# Patient Record
Sex: MALE | Race: WHITE | NOT HISPANIC OR LATINO
[De-identification: names, ages, dates, MRNs, and addresses within clinical notes are randomized per-mention and may not be internally consistent; named-entity substitution may affect disease eponyms.]

---

## 2017-01-10 ENCOUNTER — APPOINTMENT (OUTPATIENT)
Dept: CARDIOLOGY | Facility: CLINIC | Age: 67
End: 2017-01-10

## 2017-01-11 ENCOUNTER — NON-APPOINTMENT (OUTPATIENT)
Age: 67
End: 2017-01-11

## 2017-01-12 ENCOUNTER — CLINICAL ADVICE (OUTPATIENT)
Age: 67
End: 2017-01-12

## 2017-01-17 ENCOUNTER — APPOINTMENT (OUTPATIENT)
Dept: CARDIOLOGY | Facility: CLINIC | Age: 67
End: 2017-01-17

## 2017-01-23 ENCOUNTER — APPOINTMENT (OUTPATIENT)
Dept: CARDIOLOGY | Facility: CLINIC | Age: 67
End: 2017-01-23

## 2017-02-08 ENCOUNTER — CHART COPY (OUTPATIENT)
Age: 67
End: 2017-02-08

## 2017-02-22 ENCOUNTER — APPOINTMENT (OUTPATIENT)
Dept: ELECTROPHYSIOLOGY | Facility: CLINIC | Age: 67
End: 2017-02-22

## 2017-02-22 VITALS
OXYGEN SATURATION: 96 % | HEIGHT: 73 IN | HEART RATE: 70 BPM | BODY MASS INDEX: 31.14 KG/M2 | SYSTOLIC BLOOD PRESSURE: 115 MMHG | WEIGHT: 235 LBS | DIASTOLIC BLOOD PRESSURE: 79 MMHG

## 2017-02-22 RX ORDER — ZOLPIDEM TARTRATE 12.5 MG/1
12.5 TABLET, COATED ORAL
Refills: 0 | Status: ACTIVE | COMMUNITY

## 2017-03-28 ENCOUNTER — TRANSCRIPTION ENCOUNTER (OUTPATIENT)
Age: 67
End: 2017-03-28

## 2017-03-28 ENCOUNTER — OUTPATIENT (OUTPATIENT)
Dept: INPATIENT UNIT | Facility: HOSPITAL | Age: 67
LOS: 1 days | End: 2017-03-28
Payer: COMMERCIAL

## 2017-03-28 VITALS
DIASTOLIC BLOOD PRESSURE: 83 MMHG | HEIGHT: 73 IN | SYSTOLIC BLOOD PRESSURE: 137 MMHG | RESPIRATION RATE: 18 BRPM | OXYGEN SATURATION: 98 % | TEMPERATURE: 99 F | HEART RATE: 61 BPM | WEIGHT: 214.95 LBS

## 2017-03-28 DIAGNOSIS — I49 OTHER CARDIAC ARRHYTHMIAS: ICD-10-CM

## 2017-03-28 DIAGNOSIS — I49.1 ATRIAL PREMATURE DEPOLARIZATION: ICD-10-CM

## 2017-03-28 LAB
ALBUMIN SERPL ELPH-MCNC: 4.2 G/DL — SIGNIFICANT CHANGE UP (ref 3.3–5)
ALP SERPL-CCNC: 77 U/L — SIGNIFICANT CHANGE UP (ref 40–120)
ALT FLD-CCNC: 14 U/L RC — SIGNIFICANT CHANGE UP (ref 10–45)
ANION GAP SERPL CALC-SCNC: 14 MMOL/L — SIGNIFICANT CHANGE UP (ref 5–17)
APTT BLD: 30.4 SEC — SIGNIFICANT CHANGE UP (ref 27.5–37.4)
AST SERPL-CCNC: 25 U/L — SIGNIFICANT CHANGE UP (ref 10–40)
BILIRUB SERPL-MCNC: 0.3 MG/DL — SIGNIFICANT CHANGE UP (ref 0.2–1.2)
BLD GP AB SCN SERPL QL: NEGATIVE — SIGNIFICANT CHANGE UP
BUN SERPL-MCNC: 15 MG/DL — SIGNIFICANT CHANGE UP (ref 7–23)
CALCIUM SERPL-MCNC: 8.7 MG/DL — SIGNIFICANT CHANGE UP (ref 8.4–10.5)
CHLORIDE SERPL-SCNC: 106 MMOL/L — SIGNIFICANT CHANGE UP (ref 96–108)
CO2 SERPL-SCNC: 27 MMOL/L — SIGNIFICANT CHANGE UP (ref 22–31)
CREAT SERPL-MCNC: 1.15 MG/DL — SIGNIFICANT CHANGE UP (ref 0.5–1.3)
GLUCOSE SERPL-MCNC: 111 MG/DL — HIGH (ref 70–99)
HCT VFR BLD CALC: 41 % — SIGNIFICANT CHANGE UP (ref 39–50)
HGB BLD-MCNC: 13.1 G/DL — SIGNIFICANT CHANGE UP (ref 13–17)
INR BLD: 1.05 RATIO — SIGNIFICANT CHANGE UP (ref 0.88–1.16)
MCHC RBC-ENTMCNC: 28.4 PG — SIGNIFICANT CHANGE UP (ref 27–34)
MCHC RBC-ENTMCNC: 32 GM/DL — SIGNIFICANT CHANGE UP (ref 32–36)
MCV RBC AUTO: 88.7 FL — SIGNIFICANT CHANGE UP (ref 80–100)
PLATELET # BLD AUTO: 154 K/UL — SIGNIFICANT CHANGE UP (ref 150–400)
POTASSIUM SERPL-MCNC: 3.8 MMOL/L — SIGNIFICANT CHANGE UP (ref 3.5–5.3)
POTASSIUM SERPL-SCNC: 3.8 MMOL/L — SIGNIFICANT CHANGE UP (ref 3.5–5.3)
PROT SERPL-MCNC: 6.7 G/DL — SIGNIFICANT CHANGE UP (ref 6–8.3)
PROTHROM AB SERPL-ACNC: 11.3 SEC — SIGNIFICANT CHANGE UP (ref 9.8–12.7)
RBC # BLD: 4.62 M/UL — SIGNIFICANT CHANGE UP (ref 4.2–5.8)
RBC # FLD: 11.4 % — SIGNIFICANT CHANGE UP (ref 10.3–14.5)
RH IG SCN BLD-IMP: POSITIVE — SIGNIFICANT CHANGE UP
SODIUM SERPL-SCNC: 147 MMOL/L — HIGH (ref 135–145)
WBC # BLD: 4.7 K/UL — SIGNIFICANT CHANGE UP (ref 3.8–10.5)
WBC # FLD AUTO: 4.7 K/UL — SIGNIFICANT CHANGE UP (ref 3.8–10.5)

## 2017-03-28 PROCEDURE — 93653 COMPRE EP EVAL TX SVT: CPT

## 2017-03-28 PROCEDURE — 93613 INTRACARDIAC EPHYS 3D MAPG: CPT

## 2017-03-28 PROCEDURE — 99152 MOD SED SAME PHYS/QHP 5/>YRS: CPT

## 2017-03-28 RX ORDER — TRAMADOL HYDROCHLORIDE 50 MG/1
50 TABLET ORAL ONCE
Qty: 0 | Refills: 0 | Status: DISCONTINUED | OUTPATIENT
Start: 2017-03-28 | End: 2017-03-28

## 2017-03-28 RX ORDER — ZOLPIDEM TARTRATE 10 MG/1
5 TABLET ORAL AT BEDTIME
Qty: 0 | Refills: 0 | Status: DISCONTINUED | OUTPATIENT
Start: 2017-03-28 | End: 2017-03-29

## 2017-03-28 RX ORDER — TRAMADOL HYDROCHLORIDE 50 MG/1
50 TABLET ORAL EVERY 6 HOURS
Qty: 0 | Refills: 0 | Status: DISCONTINUED | OUTPATIENT
Start: 2017-03-28 | End: 2017-03-28

## 2017-03-28 RX ORDER — METOPROLOL TARTRATE 50 MG
25 TABLET ORAL DAILY
Qty: 0 | Refills: 0 | Status: DISCONTINUED | OUTPATIENT
Start: 2017-03-28 | End: 2017-03-29

## 2017-03-28 RX ORDER — ACETAMINOPHEN 500 MG
650 TABLET ORAL EVERY 6 HOURS
Qty: 0 | Refills: 0 | Status: DISCONTINUED | OUTPATIENT
Start: 2017-03-28 | End: 2017-03-29

## 2017-03-28 RX ORDER — VALSARTAN 80 MG/1
160 TABLET ORAL DAILY
Qty: 0 | Refills: 0 | Status: DISCONTINUED | OUTPATIENT
Start: 2017-03-28 | End: 2017-03-29

## 2017-03-28 RX ORDER — TRAMADOL HYDROCHLORIDE 50 MG/1
100 TABLET ORAL EVERY 6 HOURS
Qty: 0 | Refills: 0 | Status: DISCONTINUED | OUTPATIENT
Start: 2017-03-28 | End: 2017-03-29

## 2017-03-28 RX ORDER — ASPIRIN/CALCIUM CARB/MAGNESIUM 324 MG
325 TABLET ORAL DAILY
Qty: 0 | Refills: 0 | Status: DISCONTINUED | OUTPATIENT
Start: 2017-03-28 | End: 2017-03-29

## 2017-03-28 RX ADMIN — Medication 100 MILLIGRAM(S): at 17:00

## 2017-03-28 RX ADMIN — TRAMADOL HYDROCHLORIDE 50 MILLIGRAM(S): 50 TABLET ORAL at 13:56

## 2017-03-28 RX ADMIN — TRAMADOL HYDROCHLORIDE 50 MILLIGRAM(S): 50 TABLET ORAL at 13:38

## 2017-03-28 RX ADMIN — ZOLPIDEM TARTRATE 5 MILLIGRAM(S): 10 TABLET ORAL at 21:02

## 2017-03-28 RX ADMIN — TRAMADOL HYDROCHLORIDE 50 MILLIGRAM(S): 50 TABLET ORAL at 16:50

## 2017-03-28 RX ADMIN — Medication 1 APPLICATION(S): at 17:02

## 2017-03-28 RX ADMIN — TRAMADOL HYDROCHLORIDE 100 MILLIGRAM(S): 50 TABLET ORAL at 21:30

## 2017-03-28 RX ADMIN — TRAMADOL HYDROCHLORIDE 100 MILLIGRAM(S): 50 TABLET ORAL at 21:02

## 2017-03-28 RX ADMIN — Medication 650 MILLIGRAM(S): at 22:17

## 2017-03-28 RX ADMIN — ZOLPIDEM TARTRATE 5 MILLIGRAM(S): 10 TABLET ORAL at 22:17

## 2017-03-28 RX ADMIN — Medication 100 MILLIGRAM(S): at 21:02

## 2017-03-28 RX ADMIN — Medication 650 MILLIGRAM(S): at 22:40

## 2017-03-28 NOTE — DISCHARGE NOTE ADULT - PATIENT PORTAL LINK FT
“You can access the FollowHealth Patient Portal, offered by [facility], by registering with the following website: [email or web address]”

## 2017-03-28 NOTE — DISCHARGE NOTE ADULT - CARE PROVIDER_API CALL
Jesus Mcmanus (MD), Cardiac Electrophysiology; Cardiology; Internal Medicine  [address] [place] [address]  Phone: [phone]  Fax: [phone]

## 2017-03-28 NOTE — DISCHARGE NOTE ADULT - ADDITIONAL INSTRUCTIONS
follow up with your cardiologist and PCP in 1-2 weeks follow up with your cardiologist and PCP in 1-2 weeks  Follow up with Dr Mcmanus's team April 11th at 3:40pm

## 2017-03-28 NOTE — H&P CARDIOLOGY - PMH
Aneurysm  of the Aortic Root  Gout    Dizziness    TIA (transient ischemic attack)  2008  Fall  with several injuries  Insomnia    GERD (gastroesophageal reflux disease)    Hypertension Aneurysm  of the Aortic Root  Dizziness    Fall  with several injuries  GERD (gastroesophageal reflux disease)    Gout    Hypertension    Insomnia    Measles    Mumps    Nutcracker esophagus    TIA (transient ischemic attack)  2008

## 2017-03-28 NOTE — DISCHARGE NOTE ADULT - PLAN OF CARE
please follow dc instructions as per EP nurse practitioner and Md Mcmanus  No heavy lifting for 2 weeks, no strenuous activity  or uneccesary stair climbing, no driving for x 2 days,  you may shower 24 hours following procedure but no bathing or swimming for x1  week, no bending, no straining while having a Bowel movement, No strenuous sexual activity for x 1 week check groin for bleeding, pain, tightness or ( golf ball size)  swelling daily following procedure , & follow up with your cardiologist in 1-2 week Your blood pressure will be controlled. Continue with your blood pressure medications; eat a heart healthy diet with low salt diet; exercise regularly (consult with your physician or cardiologist first); maintain a heart healthy weight; if you smoke - quit (A resource to help you stop smoking is the [facility] Tobacco [facility] – phone number [phone].); include healthy ways to manage stress. Continue to follow with your primary care physician or cardiologist. will remain asymptomatic

## 2017-03-28 NOTE — DISCHARGE NOTE ADULT - CARE PROVIDERS DIRECT ADDRESSES
[email or web address]

## 2017-03-28 NOTE — H&P CARDIOLOGY - HISTORY OF PRESENT ILLNESS
61 y/o male with PMH HTN, GERD, TIA-2008, Gout,, Chronic Back Pain presented to [facility] this a:m c/o chest pressure radiating to neck associated with left shin pain and slight SOB.  Pt. had similar symptoms one week ago and was sent home from [place] ED.  Pt. underwent CTA with contrast which revealed Ascending Aortic Aneurysm with maximum caliber of 4.8 cm.  Pt. transferred to [facility] for cardiac catheterization and further evaluation. 63 y/o male with PMH HTN, GERD, TIA-2008, Gout, TIA, Chronic Back pain s/p cervical, lumbar fusion, nutcracker esophagus, Ascending Aortic Aneurysm with maximum caliber of 4.8 cm, SVT, presents for SVT ablation.

## 2017-03-28 NOTE — H&P CARDIOLOGY - PSH
S/P spinal fusion  2009-L2-L3 L3-L4  S/P spinal fusion  C 5,6,7-2009  S/P lumbar and lumbosacral fusion by anterior technique  2004  S/P knee replacement  bilateral-2006 S/P knee replacement  bilateral-2006  S/P lumbar and lumbosacral fusion by anterior technique  2004  S/P spinal fusion  2009-L2-L3 L3-L4  S/P spinal fusion  C 5,6,7-2009

## 2017-03-28 NOTE — DISCHARGE NOTE ADULT - MEDICATION SUMMARY - MEDICATIONS TO TAKE
I will START or STAY ON the medications listed below when I get home from the hospital:    traMADol 50 mg oral tablet  -- 2 tab(s) by mouth every 6 hours  -- Indication: For pain    aspirin 325 mg oral tablet  -- 1 tab(s) by mouth once a day  -- Indication: For cad    Lyrica 100 mg oral capsule  -- 1 cap(s) by mouth 3 times a day  -- Indication: For pain    Diovan  mg-12.5 mg oral tablet  -- 1 tab(s) by mouth once a day  -- Indication: For high blood pressure    Ambien CR 12.5 mg oral tablet, extended release  -- 1 tab(s) by mouth once a day (at bedtime)  -- Indication: For insomnia    Toprol-XL 25 mg oral tablet, extended release  -- 1 tab(s) by mouth once a day  -- Indication: For high blood pressure    Soma 350 mg oral tablet  -- 1 tab(s) by mouth 3 times a day, As Needed  -- Indication: For Muscle relaxant    Dexilant 60 mg oral delayed release capsule  -- 1 cap(s) by mouth once a day  -- Indication: For Acid reflux

## 2017-03-28 NOTE — DISCHARGE NOTE ADULT - CARE PLAN
Principal Discharge DX:	PVC (premature ventricular contraction)  Goal:	will remain asymptomatic  Instructions for follow-up, activity and diet:	please follow dc instructions as per EP nurse practitioner and Md Mcmanus  No heavy lifting for 2 weeks, no strenuous activity  or uneccesary stair climbing, no driving for x 2 days,  you may shower 24 hours following procedure but no bathing or swimming for x1  week, no bending, no straining while having a Bowel movement, No strenuous sexual activity for x 1 week check groin for bleeding, pain, tightness or ( golf ball size)  swelling daily following procedure , & follow up with your cardiologist in 1-2 week  Secondary Diagnosis:	Essential hypertension  Goal:	Your blood pressure will be controlled.  Instructions for follow-up, activity and diet:	Continue with your blood pressure medications; eat a heart healthy diet with low salt diet; exercise regularly (consult with your physician or cardiologist first); maintain a heart healthy weight; if you smoke - quit (A resource to help you stop smoking is the [facility] Tobacco [facility] – phone number [phone].); include healthy ways to manage stress. Continue to follow with your primary care physician or cardiologist. Principal Discharge DX:	PVC (premature ventricular contraction)  Goal:	will remain asymptomatic  Instructions for follow-up, activity and diet:	please follow dc instructions as per EP nurse practitioner and Md Mcmanus  No heavy lifting for 2 weeks, no strenuous activity  or uneccesary stair climbing, no driving for x 2 days,  you may shower 24 hours following procedure but no bathing or swimming for x1  week, no bending, no straining while having a Bowel movement, No strenuous sexual activity for x 1 week check groin for bleeding, pain, tightness or ( golf ball size)  swelling daily following procedure , & follow up with your cardiologist in 1-2 week  Secondary Diagnosis:	Essential hypertension  Goal:	Your blood pressure will be controlled.  Instructions for follow-up, activity and diet:	Continue with your blood pressure medications; eat a heart healthy diet with low salt diet; exercise regularly (consult with your physician or cardiologist first); maintain a heart healthy weight; if you smoke - quit (A resource to help you stop smoking is the [facility] – phone number [phone].); include healthy ways to manage stress. Continue to follow with your primary care physician or cardiologist. Principal Discharge DX:	PVC (premature ventricular contraction)  Goal:	will remain asymptomatic  Instructions for follow-up, activity and diet:	please follow dc instructions as per EP nurse practitioner and Md Mcmanus  No heavy lifting for 2 weeks, no strenuous activity  or uneccesary stair climbing, no driving for x 2 days,  you may shower 24 hours following procedure but no bathing or swimming for x1  week, no bending, no straining while having a Bowel movement, No strenuous sexual activity for x 1 week check groin for bleeding, pain, tightness or ( golf ball size)  swelling daily following procedure , & follow up with your cardiologist in 1-2 week  Secondary Diagnosis:	Essential hypertension  Goal:	Your blood pressure will be controlled.  Instructions for follow-up, activity and diet:	Continue with your blood pressure medications; eat a heart healthy diet with low salt diet; exercise regularly (consult with your physician or cardiologist first); maintain a heart healthy weight; if you smoke - quit (A resource to help you stop smoking is the [facility] – phone number [phone].); include healthy ways to manage stress. Continue to follow with your primary care physician or cardiologist. Principal Discharge DX:	PVC (premature ventricular contraction)  Goal:	will remain asymptomatic  Instructions for follow-up, activity and diet:	please follow dc instructions as per EP nurse practitioner and Md Mcmanus  No heavy lifting for 2 weeks, no strenuous activity  or uneccesary stair climbing, no driving for x 2 days,  you may shower 24 hours following procedure but no bathing or swimming for x1  week, no bending, no straining while having a Bowel movement, No strenuous sexual activity for x 1 week check groin for bleeding, pain, tightness or ( golf ball size)  swelling daily following procedure , & follow up with your cardiologist in 1-2 week  Secondary Diagnosis:	Essential hypertension  Goal:	Your blood pressure will be controlled.  Instructions for follow-up, activity and diet:	Continue with your blood pressure medications; eat a heart healthy diet with low salt diet; exercise regularly (consult with your physician or cardiologist first); maintain a heart healthy weight; if you smoke - quit (A resource to help you stop smoking is the [facility] Tobacco [facility] – phone number [phone].); include healthy ways to manage stress. Continue to follow with your primary care physician or cardiologist.

## 2017-03-29 VITALS
DIASTOLIC BLOOD PRESSURE: 84 MMHG | HEART RATE: 57 BPM | OXYGEN SATURATION: 95 % | SYSTOLIC BLOOD PRESSURE: 138 MMHG | RESPIRATION RATE: 18 BRPM | TEMPERATURE: 98 F

## 2017-03-29 LAB
ANION GAP SERPL CALC-SCNC: 13 MMOL/L — SIGNIFICANT CHANGE UP (ref 5–17)
BUN SERPL-MCNC: 18 MG/DL — SIGNIFICANT CHANGE UP (ref 7–23)
CALCIUM SERPL-MCNC: 8.4 MG/DL — SIGNIFICANT CHANGE UP (ref 8.4–10.5)
CHLORIDE SERPL-SCNC: 109 MMOL/L — HIGH (ref 96–108)
CO2 SERPL-SCNC: 23 MMOL/L — SIGNIFICANT CHANGE UP (ref 22–31)
CREAT SERPL-MCNC: 1 MG/DL — SIGNIFICANT CHANGE UP (ref 0.5–1.3)
GLUCOSE SERPL-MCNC: 101 MG/DL — HIGH (ref 70–99)
HCT VFR BLD CALC: 37.2 % — LOW (ref 39–50)
HGB BLD-MCNC: 12.3 G/DL — LOW (ref 13–17)
MCHC RBC-ENTMCNC: 29 PG — SIGNIFICANT CHANGE UP (ref 27–34)
MCHC RBC-ENTMCNC: 33 GM/DL — SIGNIFICANT CHANGE UP (ref 32–36)
MCV RBC AUTO: 88 FL — SIGNIFICANT CHANGE UP (ref 80–100)
PLATELET # BLD AUTO: 138 K/UL — LOW (ref 150–400)
POTASSIUM SERPL-MCNC: 3.5 MMOL/L — SIGNIFICANT CHANGE UP (ref 3.5–5.3)
POTASSIUM SERPL-SCNC: 3.5 MMOL/L — SIGNIFICANT CHANGE UP (ref 3.5–5.3)
RBC # BLD: 4.22 M/UL — SIGNIFICANT CHANGE UP (ref 4.2–5.8)
RBC # FLD: 11.2 % — SIGNIFICANT CHANGE UP (ref 10.3–14.5)
SODIUM SERPL-SCNC: 145 MMOL/L — SIGNIFICANT CHANGE UP (ref 135–145)
WBC # BLD: 5.8 K/UL — SIGNIFICANT CHANGE UP (ref 3.8–10.5)
WBC # FLD AUTO: 5.8 K/UL — SIGNIFICANT CHANGE UP (ref 3.8–10.5)

## 2017-03-29 PROCEDURE — 86850 RBC ANTIBODY SCREEN: CPT

## 2017-03-29 PROCEDURE — 80048 BASIC METABOLIC PNL TOTAL CA: CPT

## 2017-03-29 PROCEDURE — 86900 BLOOD TYPING SEROLOGIC ABO: CPT

## 2017-03-29 PROCEDURE — C1894: CPT

## 2017-03-29 PROCEDURE — 93005 ELECTROCARDIOGRAM TRACING: CPT

## 2017-03-29 PROCEDURE — 93010 ELECTROCARDIOGRAM REPORT: CPT

## 2017-03-29 PROCEDURE — 80053 COMPREHEN METABOLIC PANEL: CPT

## 2017-03-29 PROCEDURE — 99153 MOD SED SAME PHYS/QHP EA: CPT

## 2017-03-29 PROCEDURE — 85610 PROTHROMBIN TIME: CPT

## 2017-03-29 PROCEDURE — 93653 COMPRE EP EVAL TX SVT: CPT

## 2017-03-29 PROCEDURE — 85730 THROMBOPLASTIN TIME PARTIAL: CPT

## 2017-03-29 PROCEDURE — 86901 BLOOD TYPING SEROLOGIC RH(D): CPT

## 2017-03-29 PROCEDURE — 85027 COMPLETE CBC AUTOMATED: CPT

## 2017-03-29 PROCEDURE — 93613 INTRACARDIAC EPHYS 3D MAPG: CPT

## 2017-03-29 PROCEDURE — C1893: CPT

## 2017-03-29 PROCEDURE — C1730: CPT

## 2017-03-29 PROCEDURE — 99152 MOD SED SAME PHYS/QHP 5/>YRS: CPT

## 2017-03-29 RX ORDER — PANTOPRAZOLE SODIUM 20 MG/1
40 TABLET, DELAYED RELEASE ORAL
Qty: 0 | Refills: 0 | Status: DISCONTINUED | OUTPATIENT
Start: 2017-03-29 | End: 2017-03-29

## 2017-03-29 RX ADMIN — PANTOPRAZOLE SODIUM 40 MILLIGRAM(S): 20 TABLET, DELAYED RELEASE ORAL at 06:50

## 2017-03-29 RX ADMIN — Medication 650 MILLIGRAM(S): at 06:54

## 2017-03-29 RX ADMIN — VALSARTAN 160 MILLIGRAM(S): 80 TABLET ORAL at 06:31

## 2017-03-29 RX ADMIN — TRAMADOL HYDROCHLORIDE 100 MILLIGRAM(S): 50 TABLET ORAL at 02:50

## 2017-03-29 RX ADMIN — Medication 325 MILLIGRAM(S): at 13:20

## 2017-03-29 RX ADMIN — Medication 650 MILLIGRAM(S): at 06:33

## 2017-03-29 RX ADMIN — Medication 25 MILLIGRAM(S): at 06:31

## 2017-03-29 RX ADMIN — Medication 100 MILLIGRAM(S): at 06:30

## 2017-03-29 RX ADMIN — TRAMADOL HYDROCHLORIDE 100 MILLIGRAM(S): 50 TABLET ORAL at 02:37

## 2017-03-29 RX ADMIN — TRAMADOL HYDROCHLORIDE 100 MILLIGRAM(S): 50 TABLET ORAL at 09:53

## 2017-03-29 RX ADMIN — TRAMADOL HYDROCHLORIDE 100 MILLIGRAM(S): 50 TABLET ORAL at 09:22

## 2017-03-29 RX ADMIN — Medication 1 APPLICATION(S): at 06:50

## 2017-03-29 RX ADMIN — Medication 100 MILLIGRAM(S): at 13:20

## 2017-04-03 ENCOUNTER — NON-APPOINTMENT (OUTPATIENT)
Age: 67
End: 2017-04-03

## 2017-04-03 ENCOUNTER — APPOINTMENT (OUTPATIENT)
Dept: ELECTROPHYSIOLOGY | Facility: CLINIC | Age: 67
End: 2017-04-03

## 2017-04-03 VITALS
SYSTOLIC BLOOD PRESSURE: 127 MMHG | OXYGEN SATURATION: 95 % | HEIGHT: 73 IN | HEART RATE: 63 BPM | WEIGHT: 240 LBS | DIASTOLIC BLOOD PRESSURE: 84 MMHG | BODY MASS INDEX: 31.81 KG/M2

## 2017-04-03 DIAGNOSIS — R00.2 PALPITATIONS: ICD-10-CM

## 2017-04-03 DIAGNOSIS — I47.1 SUPRAVENTRICULAR TACHYCARDIA: ICD-10-CM

## 2017-04-03 RX ORDER — FLUTICASONE PROPIONATE 50 UG/1
50 SPRAY, METERED NASAL
Qty: 16 | Refills: 0 | Status: ACTIVE | COMMUNITY
Start: 2016-12-20

## 2017-04-06 ENCOUNTER — NON-APPOINTMENT (OUTPATIENT)
Age: 67
End: 2017-04-06

## 2017-04-06 ENCOUNTER — APPOINTMENT (OUTPATIENT)
Dept: CARDIOLOGY | Facility: CLINIC | Age: 67
End: 2017-04-06

## 2017-04-06 VITALS
WEIGHT: 240 LBS | HEART RATE: 62 BPM | RESPIRATION RATE: 17 BRPM | OXYGEN SATURATION: 97 % | DIASTOLIC BLOOD PRESSURE: 90 MMHG | HEIGHT: 73 IN | BODY MASS INDEX: 31.81 KG/M2 | SYSTOLIC BLOOD PRESSURE: 149 MMHG

## 2017-04-06 VITALS — SYSTOLIC BLOOD PRESSURE: 120 MMHG | DIASTOLIC BLOOD PRESSURE: 80 MMHG

## 2017-04-11 ENCOUNTER — NON-APPOINTMENT (OUTPATIENT)
Age: 67
End: 2017-04-11

## 2017-04-13 ENCOUNTER — MESSAGE (OUTPATIENT)
Age: 67
End: 2017-04-13

## 2017-05-10 ENCOUNTER — CHART COPY (OUTPATIENT)
Age: 67
End: 2017-05-10

## 2017-05-10 DIAGNOSIS — R00.2 PALPITATIONS: ICD-10-CM

## 2017-05-15 ENCOUNTER — APPOINTMENT (OUTPATIENT)
Dept: CARDIOLOGY | Facility: CLINIC | Age: 67
End: 2017-05-15

## 2017-06-12 ENCOUNTER — RX RENEWAL (OUTPATIENT)
Age: 67
End: 2017-06-12

## 2017-07-14 ENCOUNTER — RX RENEWAL (OUTPATIENT)
Age: 67
End: 2017-07-14

## 2017-09-27 ENCOUNTER — CLINICAL ADVICE (OUTPATIENT)
Age: 67
End: 2017-09-27

## 2017-10-17 ENCOUNTER — APPOINTMENT (OUTPATIENT)
Dept: CT IMAGING | Facility: HOSPITAL | Age: 67
End: 2017-10-17

## 2017-10-17 ENCOUNTER — OUTPATIENT (OUTPATIENT)
Dept: OUTPATIENT SERVICES | Facility: HOSPITAL | Age: 67
LOS: 1 days | End: 2017-10-17
Payer: COMMERCIAL

## 2017-10-17 ENCOUNTER — APPOINTMENT (OUTPATIENT)
Dept: CARDIOLOGY | Facility: CLINIC | Age: 67
End: 2017-10-17

## 2017-10-17 DIAGNOSIS — R07.9 CHEST PAIN, UNSPECIFIED: ICD-10-CM

## 2017-10-17 DIAGNOSIS — I71.9 AORTIC ANEURYSM OF UNSPECIFIED SITE, WITHOUT RUPTURE: ICD-10-CM

## 2017-10-17 LAB
BUN SERPL-MCNC: 19 MG/DL — SIGNIFICANT CHANGE UP (ref 7–23)
CREAT SERPL-MCNC: 1.33 MG/DL — HIGH (ref 0.5–1.3)

## 2017-10-17 PROCEDURE — 82565 ASSAY OF CREATININE: CPT

## 2017-10-17 PROCEDURE — 75574 CT ANGIO HRT W/3D IMAGE: CPT | Mod: 26

## 2017-10-17 PROCEDURE — 84520 ASSAY OF UREA NITROGEN: CPT

## 2017-10-17 PROCEDURE — 75574 CT ANGIO HRT W/3D IMAGE: CPT

## 2017-10-27 ENCOUNTER — APPOINTMENT (OUTPATIENT)
Dept: CARDIOTHORACIC SURGERY | Facility: CLINIC | Age: 67
End: 2017-10-27
Payer: COMMERCIAL

## 2017-10-27 VITALS — DIASTOLIC BLOOD PRESSURE: 77 MMHG | SYSTOLIC BLOOD PRESSURE: 117 MMHG

## 2017-10-27 VITALS
SYSTOLIC BLOOD PRESSURE: 112 MMHG | OXYGEN SATURATION: 97 % | DIASTOLIC BLOOD PRESSURE: 78 MMHG | HEIGHT: 74 IN | WEIGHT: 245 LBS | HEART RATE: 66 BPM | BODY MASS INDEX: 31.44 KG/M2 | TEMPERATURE: 98 F | RESPIRATION RATE: 15 BRPM

## 2017-10-27 DIAGNOSIS — Z82.49 FAMILY HISTORY OF ISCHEMIC HEART DISEASE AND OTHER DISEASES OF THE CIRCULATORY SYSTEM: ICD-10-CM

## 2017-10-27 DIAGNOSIS — Z84.89 FAMILY HISTORY OF OTHER SPECIFIED CONDITIONS: ICD-10-CM

## 2017-10-27 PROCEDURE — 99243 OFF/OP CNSLTJ NEW/EST LOW 30: CPT

## 2017-10-27 RX ORDER — PREGABALIN 50 MG/1
50 CAPSULE ORAL
Qty: 90 | Refills: 0 | Status: DISCONTINUED | COMMUNITY
Start: 2016-11-17 | End: 2017-10-27

## 2017-11-14 ENCOUNTER — OUTPATIENT (OUTPATIENT)
Dept: OUTPATIENT SERVICES | Facility: HOSPITAL | Age: 67
LOS: 1 days | End: 2017-11-14
Payer: COMMERCIAL

## 2017-11-14 VITALS
TEMPERATURE: 98 F | SYSTOLIC BLOOD PRESSURE: 128 MMHG | RESPIRATION RATE: 16 BRPM | HEART RATE: 59 BPM | OXYGEN SATURATION: 96 % | DIASTOLIC BLOOD PRESSURE: 63 MMHG | WEIGHT: 244.93 LBS | HEIGHT: 73 IN

## 2017-11-14 DIAGNOSIS — Z98.890 OTHER SPECIFIED POSTPROCEDURAL STATES: Chronic | ICD-10-CM

## 2017-11-14 DIAGNOSIS — Z90.49 ACQUIRED ABSENCE OF OTHER SPECIFIED PARTS OF DIGESTIVE TRACT: Chronic | ICD-10-CM

## 2017-11-14 DIAGNOSIS — R93.1 ABNORMAL FINDINGS ON DIAGNOSTIC IMAGING OF HEART AND CORONARY CIRCULATION: ICD-10-CM

## 2017-11-14 DIAGNOSIS — I49.1 ATRIAL PREMATURE DEPOLARIZATION: ICD-10-CM

## 2017-11-14 LAB
ALBUMIN SERPL ELPH-MCNC: 4.3 G/DL — SIGNIFICANT CHANGE UP (ref 3.3–5)
ALP SERPL-CCNC: 59 U/L — SIGNIFICANT CHANGE UP (ref 40–120)
ALT FLD-CCNC: 12 U/L RC — SIGNIFICANT CHANGE UP (ref 10–45)
ANION GAP SERPL CALC-SCNC: 13 MMOL/L — SIGNIFICANT CHANGE UP (ref 5–17)
AST SERPL-CCNC: 21 U/L — SIGNIFICANT CHANGE UP (ref 10–40)
BILIRUB SERPL-MCNC: 0.3 MG/DL — SIGNIFICANT CHANGE UP (ref 0.2–1.2)
BUN SERPL-MCNC: 21 MG/DL — SIGNIFICANT CHANGE UP (ref 7–23)
CALCIUM SERPL-MCNC: 9.4 MG/DL — SIGNIFICANT CHANGE UP (ref 8.4–10.5)
CHLORIDE SERPL-SCNC: 104 MMOL/L — SIGNIFICANT CHANGE UP (ref 96–108)
CO2 SERPL-SCNC: 26 MMOL/L — SIGNIFICANT CHANGE UP (ref 22–31)
CREAT SERPL-MCNC: 1.36 MG/DL — HIGH (ref 0.5–1.3)
GLUCOSE SERPL-MCNC: 114 MG/DL — HIGH (ref 70–99)
HCT VFR BLD CALC: 41.9 % — SIGNIFICANT CHANGE UP (ref 39–50)
HGB BLD-MCNC: 13.4 G/DL — SIGNIFICANT CHANGE UP (ref 13–17)
MCHC RBC-ENTMCNC: 29.1 PG — SIGNIFICANT CHANGE UP (ref 27–34)
MCHC RBC-ENTMCNC: 31.9 GM/DL — LOW (ref 32–36)
MCV RBC AUTO: 91.2 FL — SIGNIFICANT CHANGE UP (ref 80–100)
PLATELET # BLD AUTO: 172 K/UL — SIGNIFICANT CHANGE UP (ref 150–400)
POTASSIUM SERPL-MCNC: 4.4 MMOL/L — SIGNIFICANT CHANGE UP (ref 3.5–5.3)
POTASSIUM SERPL-SCNC: 4.4 MMOL/L — SIGNIFICANT CHANGE UP (ref 3.5–5.3)
PROT SERPL-MCNC: 7 G/DL — SIGNIFICANT CHANGE UP (ref 6–8.3)
RBC # BLD: 4.59 M/UL — SIGNIFICANT CHANGE UP (ref 4.2–5.8)
RBC # FLD: 12.3 % — SIGNIFICANT CHANGE UP (ref 10.3–14.5)
SODIUM SERPL-SCNC: 143 MMOL/L — SIGNIFICANT CHANGE UP (ref 135–145)
WBC # BLD: 7.1 K/UL — SIGNIFICANT CHANGE UP (ref 3.8–10.5)
WBC # FLD AUTO: 7.1 K/UL — SIGNIFICANT CHANGE UP (ref 3.8–10.5)

## 2017-11-14 PROCEDURE — 80053 COMPREHEN METABOLIC PANEL: CPT

## 2017-11-14 PROCEDURE — 93010 ELECTROCARDIOGRAM REPORT: CPT

## 2017-11-14 PROCEDURE — 99152 MOD SED SAME PHYS/QHP 5/>YRS: CPT

## 2017-11-14 PROCEDURE — 93567 NJX CAR CTH SPRVLV AORTGRPHY: CPT

## 2017-11-14 PROCEDURE — C1887: CPT

## 2017-11-14 PROCEDURE — C1769: CPT

## 2017-11-14 PROCEDURE — 93458 L HRT ARTERY/VENTRICLE ANGIO: CPT | Mod: 26

## 2017-11-14 PROCEDURE — 93458 L HRT ARTERY/VENTRICLE ANGIO: CPT

## 2017-11-14 PROCEDURE — C1894: CPT

## 2017-11-14 PROCEDURE — 93005 ELECTROCARDIOGRAM TRACING: CPT

## 2017-11-14 PROCEDURE — 85027 COMPLETE CBC AUTOMATED: CPT

## 2017-11-14 RX ORDER — CARISOPRODOL 250 MG
1 TABLET ORAL
Qty: 0 | Refills: 0 | COMMUNITY

## 2017-11-14 RX ORDER — METOPROLOL TARTRATE 50 MG
1 TABLET ORAL
Qty: 0 | Refills: 0 | COMMUNITY

## 2017-11-14 NOTE — H&P CARDIOLOGY - PSH
H/O cardiac catheterization    History of appendectomy    S/P knee replacement  bilateral-2006  S/P lumbar and lumbosacral fusion by anterior technique  2004  S/P spinal fusion  2009-L2-L3 L3-L4  S/P spinal fusion  C 5,6,7-2009

## 2017-11-14 NOTE — H&P CARDIOLOGY - PMH
Aneurysm  of the Aortic Root  Back pain    Dizziness    Fall  with several injuries  GERD (gastroesophageal reflux disease)    Gout    Hypertension    Insomnia    Measles    Mumps    Nutcracker esophagus    Nutcracker esophagus    OA (osteoarthritis)    SVT (supraventricular tachycardia)    TIA (transient ischemic attack)    TIA (transient ischemic attack)  2008 Aneurysm  of the Aortic Root  Back pain    Dizziness    Fall  with several injuries  GERD (gastroesophageal reflux disease)    Gout    Hypertension    Insomnia    Measles    Mumps    Nutcracker esophagus    Nutcracker esophagus    OA (osteoarthritis)    SVT (supraventricular tachycardia)    TIA (transient ischemic attack)    TIA (transient ischemic attack)  2008  Tinnitus    Vertigo

## 2017-11-14 NOTE — H&P CARDIOLOGY - HISTORY OF PRESENT ILLNESS
This is a 66 yo male wit PMH  HTN, nutcracker esophagus OA, aneurysm of the aortic root who presents today for a cardiac angiogram This is a 66 yo male wit PMH  HTN, nutcracker esophagus OA, aneurysm of the aortic root who presents today for a cardiac angiogram. The patient reports he has been experiencing       < from: CT Heart with Coronaries (10.17.17 @ 17:10) >  IMPRESSION:    1.  Non-obstructive coronary artery disease:  LM:  angiographically normal  LAD:  mild (30-50%) mixed non-calcified and calcified plaque in the   proximal and mid segments; minimal (<30%) calcified plaque in the   proximal diagonal branch  LCX:  small-caliber; angiographically normal  RCA:  dominant; minimal (<30%) calcified plaque in the proximal and   distal segments  2.  Mild coronary artery calcium (Agatston score 34).  The observed   calcium score is at the 34th percentile for age, gender and   race/ethnicity.  3.  Dilated ascending aorta measuring 44.9 mm as described above.      Compared to coronary CT angiography 8.15.14 there has been a mild   increase in coronary artery disease severity and extent.  There has been   no significant change in caliber of the imaged segments of the thoracic   aorta compared to the cardiac CT performed 8.15.14.      < end of copied text > This is a 68 yo male wit PMH  HTN, GLENDY not on CPAP ( did not tolerate) ,  nutcracker esophagus OA, SVT s/p ablation,  aneurysm of the aortic root who presents today for a cardiac angiogram. The patient reports he has been experiencing LCW chest pain that radiates to his arm and jaw. The pain is describes as sharp 7/10 lasting from 1 - 6 hours. It  occurs with carrying heavy objects or walking uphill. and   is associated with dyspnea. The pain has been occurring over the last few month and improves with laying down. He was seen and evaluated by his cardiologist, Dr Mcknight and referred to [facility] for further evaluation. CT of the heart results as below      < from: CT Heart with Coronaries (10.17.17 @ 17:10) >  IMPRESSION:    1.  Non-obstructive coronary artery disease:  LM:  angiographically normal  LAD:  mild (30-50%) mixed non-calcified and calcified plaque in the   proximal and mid segments; minimal (<30%) calcified plaque in the   proximal diagonal branch  LCX:  small-caliber; angiographically normal  RCA:  dominant; minimal (<30%) calcified plaque in the proximal and   distal segments  2.  Mild coronary artery calcium (Agatston score 34).  The observed   calcium score is at the 34th percentile for age, gender and   race/ethnicity.  3.  Dilated ascending aorta measuring 44.9 mm as described above.      Compared to coronary CT angiography 8.15.14 there has been a mild   increase in coronary artery disease severity and extent.  There has been   no significant change in caliber of the imaged segments of the thoracic   aorta compared to the cardiac CT performed 8.15.14.      < end of copied text >

## 2018-02-22 ENCOUNTER — RX RENEWAL (OUTPATIENT)
Age: 68
End: 2018-02-22

## 2018-03-02 ENCOUNTER — RX RENEWAL (OUTPATIENT)
Age: 68
End: 2018-03-02

## 2018-05-16 ENCOUNTER — RX RENEWAL (OUTPATIENT)
Age: 68
End: 2018-05-16

## 2018-08-15 ENCOUNTER — RX RENEWAL (OUTPATIENT)
Age: 68
End: 2018-08-15

## 2018-09-18 ENCOUNTER — OUTPATIENT (OUTPATIENT)
Dept: OUTPATIENT SERVICES | Facility: HOSPITAL | Age: 68
LOS: 1 days | End: 2018-09-18
Payer: COMMERCIAL

## 2018-09-18 ENCOUNTER — APPOINTMENT (OUTPATIENT)
Dept: MRI IMAGING | Facility: HOSPITAL | Age: 68
End: 2018-09-18
Payer: COMMERCIAL

## 2018-09-18 DIAGNOSIS — Z00.8 ENCOUNTER FOR OTHER GENERAL EXAMINATION: ICD-10-CM

## 2018-09-18 DIAGNOSIS — Z98.890 OTHER SPECIFIED POSTPROCEDURAL STATES: Chronic | ICD-10-CM

## 2018-09-18 DIAGNOSIS — Z90.49 ACQUIRED ABSENCE OF OTHER SPECIFIED PARTS OF DIGESTIVE TRACT: Chronic | ICD-10-CM

## 2018-09-18 PROBLEM — R42 DIZZINESS AND GIDDINESS: Chronic | Status: ACTIVE | Noted: 2017-11-14

## 2018-09-18 PROBLEM — B26.9 MUMPS WITHOUT COMPLICATION: Chronic | Status: ACTIVE | Noted: 2017-03-28

## 2018-09-18 PROBLEM — K22.4 DYSKINESIA OF ESOPHAGUS: Chronic | Status: ACTIVE | Noted: 2017-03-28

## 2018-09-18 PROBLEM — G45.9 TRANSIENT CEREBRAL ISCHEMIC ATTACK, UNSPECIFIED: Chronic | Status: ACTIVE | Noted: 2017-11-14

## 2018-09-18 PROBLEM — M54.9 DORSALGIA, UNSPECIFIED: Chronic | Status: ACTIVE | Noted: 2017-11-14

## 2018-09-18 PROBLEM — B05.9 MEASLES WITHOUT COMPLICATION: Chronic | Status: ACTIVE | Noted: 2017-03-28

## 2018-09-18 PROBLEM — M19.90 UNSPECIFIED OSTEOARTHRITIS, UNSPECIFIED SITE: Chronic | Status: ACTIVE | Noted: 2017-11-14

## 2018-09-18 PROBLEM — I47.1 SUPRAVENTRICULAR TACHYCARDIA: Chronic | Status: ACTIVE | Noted: 2017-11-14

## 2018-09-18 PROBLEM — H93.19 TINNITUS, UNSPECIFIED EAR: Chronic | Status: ACTIVE | Noted: 2017-11-14

## 2018-09-18 PROBLEM — K22.4 DYSKINESIA OF ESOPHAGUS: Chronic | Status: ACTIVE | Noted: 2017-11-14

## 2018-09-18 PROCEDURE — 73221 MRI JOINT UPR EXTREM W/O DYE: CPT

## 2018-09-18 PROCEDURE — 73221 MRI JOINT UPR EXTREM W/O DYE: CPT | Mod: 26,LT

## 2018-11-12 ENCOUNTER — RX RENEWAL (OUTPATIENT)
Age: 68
End: 2018-11-12

## 2018-11-28 ENCOUNTER — RX RENEWAL (OUTPATIENT)
Age: 68
End: 2018-11-28

## 2019-03-15 ENCOUNTER — RX RENEWAL (OUTPATIENT)
Age: 69
End: 2019-03-15

## 2019-03-16 ENCOUNTER — RX RENEWAL (OUTPATIENT)
Age: 69
End: 2019-03-16

## 2019-04-29 ENCOUNTER — RX RENEWAL (OUTPATIENT)
Age: 69
End: 2019-04-29

## 2019-06-09 ENCOUNTER — RX RENEWAL (OUTPATIENT)
Age: 69
End: 2019-06-09

## 2019-06-11 ENCOUNTER — TRANSCRIPTION ENCOUNTER (OUTPATIENT)
Age: 69
End: 2019-06-11

## 2019-10-04 ENCOUNTER — RX RENEWAL (OUTPATIENT)
Age: 69
End: 2019-10-04

## 2019-10-24 ENCOUNTER — APPOINTMENT (OUTPATIENT)
Dept: RADIOLOGY | Facility: HOSPITAL | Age: 69
End: 2019-10-24
Payer: COMMERCIAL

## 2019-10-24 ENCOUNTER — OUTPATIENT (OUTPATIENT)
Dept: OUTPATIENT SERVICES | Facility: HOSPITAL | Age: 69
LOS: 1 days | End: 2019-10-24
Payer: COMMERCIAL

## 2019-10-24 DIAGNOSIS — Z90.49 ACQUIRED ABSENCE OF OTHER SPECIFIED PARTS OF DIGESTIVE TRACT: Chronic | ICD-10-CM

## 2019-10-24 DIAGNOSIS — Z98.890 OTHER SPECIFIED POSTPROCEDURAL STATES: Chronic | ICD-10-CM

## 2019-10-24 DIAGNOSIS — R05 COUGH: ICD-10-CM

## 2019-10-24 PROCEDURE — 71046 X-RAY EXAM CHEST 2 VIEWS: CPT

## 2019-10-24 PROCEDURE — 71046 X-RAY EXAM CHEST 2 VIEWS: CPT | Mod: 26

## 2019-11-04 ENCOUNTER — APPOINTMENT (OUTPATIENT)
Dept: ORTHOPEDIC SURGERY | Facility: CLINIC | Age: 69
End: 2019-11-04
Payer: COMMERCIAL

## 2019-11-04 VITALS
BODY MASS INDEX: 30.8 KG/M2 | DIASTOLIC BLOOD PRESSURE: 95 MMHG | HEIGHT: 74 IN | WEIGHT: 240 LBS | SYSTOLIC BLOOD PRESSURE: 145 MMHG | HEART RATE: 61 BPM

## 2019-11-04 PROCEDURE — 73562 X-RAY EXAM OF KNEE 3: CPT | Mod: 50

## 2019-11-04 PROCEDURE — 99213 OFFICE O/P EST LOW 20 MIN: CPT

## 2019-11-15 ENCOUNTER — APPOINTMENT (OUTPATIENT)
Dept: PULMONOLOGY | Facility: CLINIC | Age: 69
End: 2019-11-15
Payer: COMMERCIAL

## 2019-11-15 VITALS
WEIGHT: 247 LBS | HEART RATE: 69 BPM | HEIGHT: 74 IN | RESPIRATION RATE: 16 BRPM | BODY MASS INDEX: 31.7 KG/M2 | SYSTOLIC BLOOD PRESSURE: 140 MMHG | DIASTOLIC BLOOD PRESSURE: 80 MMHG | TEMPERATURE: 98 F | OXYGEN SATURATION: 79 %

## 2019-11-15 PROCEDURE — 99205 OFFICE O/P NEW HI 60 MIN: CPT

## 2019-11-15 RX ORDER — CLINDAMYCIN HYDROCHLORIDE 150 MG/1
150 CAPSULE ORAL
Refills: 0 | Status: DISCONTINUED | COMMUNITY
Start: 2016-12-20 | End: 2019-11-15

## 2020-01-10 ENCOUNTER — APPOINTMENT (OUTPATIENT)
Dept: PULMONOLOGY | Facility: CLINIC | Age: 70
End: 2020-01-10
Payer: COMMERCIAL

## 2020-01-10 VITALS
BODY MASS INDEX: 31.32 KG/M2 | HEIGHT: 74 IN | OXYGEN SATURATION: 97 % | RESPIRATION RATE: 17 BRPM | WEIGHT: 244 LBS | HEART RATE: 78 BPM | DIASTOLIC BLOOD PRESSURE: 90 MMHG | SYSTOLIC BLOOD PRESSURE: 140 MMHG | TEMPERATURE: 97.8 F

## 2020-01-10 DIAGNOSIS — I10 ESSENTIAL (PRIMARY) HYPERTENSION: ICD-10-CM

## 2020-01-10 PROCEDURE — 99213 OFFICE O/P EST LOW 20 MIN: CPT

## 2020-01-10 NOTE — PHYSICAL EXAM
[General Appearance - Well Developed] : well developed [Normal Appearance] : normal appearance [Well Groomed] : well groomed [General Appearance - Well Nourished] : well nourished [No Deformities] : no deformities [General Appearance - In No Acute Distress] : no acute distress [Normal Conjunctiva] : the conjunctiva exhibited no abnormalities [Eyelids - No Xanthelasma] : the eyelids demonstrated no xanthelasmas [Normal Oropharynx] : normal oropharynx [Neck Appearance] : the appearance of the neck was normal [Neck Cervical Mass (___cm)] : no neck mass was observed [Jugular Venous Distention Increased] : there was no jugular-venous distention [Thyroid Diffuse Enlargement] : the thyroid was not enlarged [Thyroid Nodule] : there were no palpable thyroid nodules [Heart Rate And Rhythm] : heart rate and rhythm were normal [Heart Sounds] : normal S1 and S2 [Murmurs] : no murmurs present [Auscultation Breath Sounds / Voice Sounds] : lungs were clear to auscultation bilaterally [Respiration, Rhythm And Depth] : normal respiratory rhythm and effort [Exaggerated Use Of Accessory Muscles For Inspiration] : no accessory muscle use [Abnormal Walk] : normal gait [Gait - Sufficient For Exercise Testing] : the gait was sufficient for exercise testing [Cyanosis, Localized] : no localized cyanosis [Petechial Hemorrhages (___cm)] : no petechial hemorrhages [Nail Clubbing] : no clubbing of the fingernails [] : no ischemic changes [Deep Tendon Reflexes (DTR)] : deep tendon reflexes were 2+ and symmetric [Sensation] : the sensory exam was normal to light touch and pinprick [No Focal Deficits] : no focal deficits

## 2020-01-16 ENCOUNTER — MOBILE ON CALL (OUTPATIENT)
Age: 70
End: 2020-01-16

## 2020-01-23 ENCOUNTER — LABORATORY RESULT (OUTPATIENT)
Age: 70
End: 2020-01-23

## 2020-01-23 ENCOUNTER — APPOINTMENT (OUTPATIENT)
Dept: PULMONOLOGY | Facility: CLINIC | Age: 70
End: 2020-01-23
Payer: COMMERCIAL

## 2020-01-23 VITALS
WEIGHT: 233 LBS | RESPIRATION RATE: 17 BRPM | HEART RATE: 63 BPM | DIASTOLIC BLOOD PRESSURE: 98 MMHG | SYSTOLIC BLOOD PRESSURE: 152 MMHG | OXYGEN SATURATION: 98 % | HEIGHT: 72 IN | TEMPERATURE: 97.6 F | BODY MASS INDEX: 31.56 KG/M2

## 2020-01-23 VITALS — BODY MASS INDEX: 31.56 KG/M2 | OXYGEN SATURATION: 98 % | WEIGHT: 233 LBS | HEART RATE: 81 BPM | HEIGHT: 72 IN

## 2020-01-23 PROCEDURE — 94726 PLETHYSMOGRAPHY LUNG VOLUMES: CPT

## 2020-01-23 PROCEDURE — 99245 OFF/OP CONSLTJ NEW/EST HI 55: CPT | Mod: 25

## 2020-01-23 PROCEDURE — 94729 DIFFUSING CAPACITY: CPT

## 2020-01-23 PROCEDURE — 94060 EVALUATION OF WHEEZING: CPT

## 2020-01-23 PROCEDURE — ZZZZZ: CPT

## 2020-01-23 PROCEDURE — 94618 PULMONARY STRESS TESTING: CPT

## 2020-01-23 PROCEDURE — 99215 OFFICE O/P EST HI 40 MIN: CPT | Mod: 25

## 2020-01-23 PROCEDURE — 36415 COLL VENOUS BLD VENIPUNCTURE: CPT

## 2020-01-23 NOTE — PHYSICAL EXAM
[Normal Appearance] : normal appearance [Well Groomed] : well groomed [General Appearance - Well Developed] : well developed [General Appearance - Well Nourished] : well nourished [No Deformities] : no deformities [Normal Conjunctiva] : the conjunctiva exhibited no abnormalities [General Appearance - In No Acute Distress] : no acute distress [Eyelids - No Xanthelasma] : the eyelids demonstrated no xanthelasmas [Normal Oropharynx] : normal oropharynx [Neck Appearance] : the appearance of the neck was normal [Neck Cervical Mass (___cm)] : no neck mass was observed [Thyroid Diffuse Enlargement] : the thyroid was not enlarged [Jugular Venous Distention Increased] : there was no jugular-venous distention [Heart Sounds] : normal S1 and S2 [Thyroid Nodule] : there were no palpable thyroid nodules [Heart Rate And Rhythm] : heart rate and rhythm were normal [Murmurs] : no murmurs present [Respiration, Rhythm And Depth] : normal respiratory rhythm and effort [Exaggerated Use Of Accessory Muscles For Inspiration] : no accessory muscle use [Auscultation Breath Sounds / Voice Sounds] : lungs were clear to auscultation bilaterally [Tenderness: ____] : tenderness [unfilled] [Abdomen Soft] : soft [Abdomen Tenderness] : non-tender [Abdomen Mass (___ Cm)] : no abdominal mass palpated [Abnormal Walk] : normal gait [Gait - Sufficient For Exercise Testing] : the gait was sufficient for exercise testing [Nail Clubbing] : no clubbing of the fingernails [Petechial Hemorrhages (___cm)] : no petechial hemorrhages [Cyanosis, Localized] : no localized cyanosis [Skin Color & Pigmentation] : normal skin color and pigmentation [Skin Turgor] : normal skin turgor [Deep Tendon Reflexes (DTR)] : deep tendon reflexes were 2+ and symmetric [] : no rash [Sensation] : the sensory exam was normal to light touch and pinprick [Oriented To Time, Place, And Person] : oriented to person, place, and time [No Focal Deficits] : no focal deficits [Affect] : the affect was normal [Impaired Insight] : insight and judgment were intact

## 2020-01-23 NOTE — HISTORY OF PRESENT ILLNESS
[FreeTextEntry1] : This letter  is regarding your patient  who  attended pulmonary out patient office today.  I have reviewed  patient's  past history, social history, family history and medication list. I also  reviewed nurse practitioners/ and fellows  notes and assessment and agree with it.  \par The patient was referred by  for puml htn\par \par This is a 69-year-old male with prior medical history significant for atypical chest pain. Patient recently had cardiac catheterization that revealed some elevated right-sided heart pressures. He complains of swelling increasing dyspnea on exertion that has gotten worse over the past several years. He states that he now has difficulty with climbing stairs. He reportedly had a CTA of the chest that was within normal limits.   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 Questionable history of Raynaud's phenomenon. No h/o significant weight loss in last few months. No history of liver dysfunction , collagen vascular disorder or chronic thromboembolic disease. I would classify the patient's dyspnea as WHO  FUNCTIONAL CLASS II--------\par \par ----Echo  date--unavailable----\par ----Pft date---1/2020- normal lung volumes------NORMAL DLCO \par ---6MWT  355 METERS , NO DESATURATION\par ----Chest x-ray within normal limits. Patient to bring in copy of CAT scan of chest. \par ----Cath date----2019 Cardiac catheter note reveals elevated right-sided heart pressures with RV 51/17 mean arterial pressure 23, RA pressures 23/18 MAP 17  --mean pa=36, pcwp 21, co=8.42 polo, lkw=451------\par \par \par

## 2020-01-23 NOTE — REVIEW OF SYSTEMS
[Dyspnea] : dyspnea [Hypertension] : ~T hypertension [Dysrhythmia] : dysrhythmia [Edema] : ~T edema was present [Dizziness] : dizziness [As Noted in HPI] : as noted in HPI [Negative] : Psychiatric

## 2020-01-23 NOTE — DISCUSSION/SUMMARY
[FreeTextEntry1] : ---Assessment plan----------The patient has been referred here for further opinion regarding pulmonary problem,\par 70 yo referred for SECONDARY  pulm htn, PMH   ?CAD , SVT, GLENDY untreated\par 1) pulm htn labs drawn in our office today\par 2) c/o productive cough- start augmentin and prednisone\par 3) I would like to review CD of his recent CT chest \par 4) NEED  ECHO AND CATH RESULTS FROM DR UREÑA FROM [facility] f/u in 2-3 weeks\par \par Thanks for allowing  me to participate  in the care of this patient.  Patient at this time  will follow  the above mentioned recommendations and return back for follow up visit. If you have any questions  I can be reached  at #[phone] (beeper)  or  [phone] (office).\par \par Sherlyn Hollis MD, FCCP \par Director, Pulmonary Hypertension Program \par [facility] \par Division of Pulmonary, Critical Care and Sleep Medicine \par  Professor of Medicine \par [employer]\par

## 2020-01-24 LAB
25(OH)D3 SERPL-MCNC: 29 NG/ML
A1AT SERPL-MCNC: 132 MG/DL
ALBUMIN SERPL ELPH-MCNC: 5 G/DL
ALP BLD-CCNC: 87 U/L
ALT SERPL-CCNC: 20 U/L
ANION GAP SERPL CALC-SCNC: 18 MMOL/L
APTT BLD: 27.7 SEC
AST SERPL-CCNC: 21 U/L
B2 MICROGLOB SERPL-MCNC: 2.7 MG/L
BASOPHILS # BLD AUTO: 0.06 K/UL
BASOPHILS NFR BLD AUTO: 0.6 %
BILIRUB SERPL-MCNC: 0.4 MG/DL
BUN SERPL-MCNC: 36 MG/DL
CALCIUM SERPL-MCNC: 10.3 MG/DL
CALCIUM SERPL-MCNC: 10.3 MG/DL
CHLORIDE SERPL-SCNC: 95 MMOL/L
CO2 SERPL-SCNC: 32 MMOL/L
CREAT SERPL-MCNC: 1.31 MG/DL
CRP SERPL-MCNC: 0.48 MG/DL
DEPRECATED KAPPA LC FREE/LAMBDA SER: 1.91 RATIO
EOSINOPHIL # BLD AUTO: 0.22 K/UL
EOSINOPHIL NFR BLD AUTO: 2.1 %
ERYTHROCYTE [SEDIMENTATION RATE] IN BLOOD BY WESTERGREN METHOD: 69 MM/HR
FOLATE RBC-MCNC: 962 NG/ML
GLUCOSE SERPL-MCNC: 134 MG/DL
HCT VFR BLD CALC: 47.3 %
HCT VFR BLD CALC: 47.3 %
HCYS SERPL-MCNC: 17.5 UMOL/L
HGB BLD-MCNC: 14.5 G/DL
IGA SER QL IEP: 206 MG/DL
IGG SER QL IEP: 862 MG/DL
IGM SER QL IEP: 78 MG/DL
IMM GRANULOCYTES NFR BLD AUTO: 0.7 %
INR PPP: 1.01 RATIO
KAPPA LC CSF-MCNC: 1.27 MG/DL
KAPPA LC SERPL-MCNC: 2.43 MG/DL
LYMPHOCYTES # BLD AUTO: 3.08 K/UL
LYMPHOCYTES NFR BLD AUTO: 28.9 %
MAN DIFF?: NORMAL
MCHC RBC-ENTMCNC: 27.7 PG
MCHC RBC-ENTMCNC: 30.7 GM/DL
MCV RBC AUTO: 90.3 FL
MONOCYTES # BLD AUTO: 0.9 K/UL
MONOCYTES NFR BLD AUTO: 8.5 %
NEUTROPHILS # BLD AUTO: 6.31 K/UL
NEUTROPHILS NFR BLD AUTO: 59.2 %
NT-PROBNP SERPL-MCNC: 74 PG/ML
PARATHYROID HORMONE INTACT: 90 PG/ML
PHOSPHATE SERPL-MCNC: 3.2 MG/DL
PLATELET # BLD AUTO: 333 K/UL
POTASSIUM SERPL-SCNC: 3.7 MMOL/L
PROT SERPL-MCNC: 7.5 G/DL
PT BLD: 11.4 SEC
RBC # BLD: 5.24 M/UL
RBC # FLD: 12.1 %
RHEUMATOID FACT SER QL: <10 IU/ML
SODIUM SERPL-SCNC: 145 MMOL/L
T3 SERPL-MCNC: 105 NG/DL
T3FREE SERPL-MCNC: 3.19 PG/ML
T3RU NFR SERPL: 1 TBI
T4 FREE SERPL-MCNC: 1.3 NG/DL
TSH SERPL-ACNC: 0.96 UIU/ML
URATE SERPL-MCNC: 13.1 MG/DL
VIT B12 SERPL-MCNC: 393 PG/ML
WBC # FLD AUTO: 10.64 K/UL

## 2020-01-27 LAB
CCP AB SER IA-ACNC: 9 UNITS
COLLAGEN CTX SERPL-MCNC: 486 PG/ML
ENA SCL70 IGG SER IA-ACNC: <0.2 AL
ENA SS-A AB SER IA-ACNC: <0.2 AL
ENA SS-B AB SER IA-ACNC: <0.2 AL
M TB IFN-G BLD-IMP: NEGATIVE
MPO AB + PR3 PNL SER: NORMAL
QUANTIFERON TB PLUS MITOGEN MINUS NIL: >10 IU/ML
QUANTIFERON TB PLUS NIL: 0.03 IU/ML
QUANTIFERON TB PLUS TB1 MINUS NIL: -0.01 IU/ML
QUANTIFERON TB PLUS TB2 MINUS NIL: 0 IU/ML
RF+CCP IGG SER-IMP: NEGATIVE
TOTAL IGE SMQN RAST: 2710 KU/L

## 2020-01-28 LAB
A1AT PHENOTYP SERPL-IMP: NORMAL BANDS
A1AT SERPL-MCNC: 141 MG/DL
CARDIOLIPIN AB SER IA-ACNC: NEGATIVE

## 2020-01-29 LAB
ANACR T: NEGATIVE
FUNGITELL QUANTITATIVE VALUE: >500 PG/ML

## 2020-01-30 LAB — T4 FREE SERPL DIALY-MCNC: 1.3 NG/DL

## 2020-01-31 LAB
A FLAVUS AB FLD QL: NEGATIVE
A FUMIGATUS AB FLD QL: NEGATIVE
A NIGER AB FLD QL: NEGATIVE

## 2020-02-07 LAB
ASPERGILLUS FLAVUS PRECIPITINS: NEGATIVE
ASPERGILLUS FUMIGATES PRECIPTINS: NEGATIVE
ASPERGILLUS NIGER PRECIPITINS: NEGATIVE

## 2020-02-11 ENCOUNTER — APPOINTMENT (OUTPATIENT)
Dept: PULMONOLOGY | Facility: CLINIC | Age: 70
End: 2020-02-11
Payer: COMMERCIAL

## 2020-02-11 VITALS
TEMPERATURE: 97 F | DIASTOLIC BLOOD PRESSURE: 69 MMHG | HEART RATE: 78 BPM | BODY MASS INDEX: 33.18 KG/M2 | OXYGEN SATURATION: 98 % | HEIGHT: 72 IN | SYSTOLIC BLOOD PRESSURE: 104 MMHG | WEIGHT: 245 LBS | RESPIRATION RATE: 16 BRPM

## 2020-02-11 PROCEDURE — 94762 N-INVAS EAR/PLS OXIMTRY CONT: CPT

## 2020-02-11 PROCEDURE — 99215 OFFICE O/P EST HI 40 MIN: CPT | Mod: 25

## 2020-02-11 PROCEDURE — 36415 COLL VENOUS BLD VENIPUNCTURE: CPT

## 2020-02-11 PROCEDURE — 96372 THER/PROPH/DIAG INJ SC/IM: CPT

## 2020-02-11 RX ORDER — AMOXICILLIN AND CLAVULANATE POTASSIUM 875; 125 MG/1; MG/1
875-125 TABLET, COATED ORAL
Qty: 14 | Refills: 0 | Status: DISCONTINUED | COMMUNITY
Start: 2020-01-23 | End: 2020-02-11

## 2020-02-11 NOTE — PHYSICAL EXAM
[General Appearance - Well Developed] : well developed [Normal Appearance] : normal appearance [General Appearance - Well Nourished] : well nourished [Well Groomed] : well groomed [No Deformities] : no deformities [General Appearance - In No Acute Distress] : no acute distress [Normal Conjunctiva] : the conjunctiva exhibited no abnormalities [Eyelids - No Xanthelasma] : the eyelids demonstrated no xanthelasmas [Normal Oropharynx] : normal oropharynx [Neck Appearance] : the appearance of the neck was normal [Neck Cervical Mass (___cm)] : no neck mass was observed [Jugular Venous Distention Increased] : there was no jugular-venous distention [Thyroid Diffuse Enlargement] : the thyroid was not enlarged [Thyroid Nodule] : there were no palpable thyroid nodules [Heart Sounds] : normal S1 and S2 [Heart Rate And Rhythm] : heart rate and rhythm were normal [Murmurs] : no murmurs present [Exaggerated Use Of Accessory Muscles For Inspiration] : no accessory muscle use [Respiration, Rhythm And Depth] : normal respiratory rhythm and effort [Auscultation Breath Sounds / Voice Sounds] : lungs were clear to auscultation bilaterally [Tenderness: ____] : tenderness [unfilled] [Abdomen Soft] : soft [Abdomen Tenderness] : non-tender [Abdomen Mass (___ Cm)] : no abdominal mass palpated [Abnormal Walk] : normal gait [Nail Clubbing] : no clubbing of the fingernails [Gait - Sufficient For Exercise Testing] : the gait was sufficient for exercise testing [Cyanosis, Localized] : no localized cyanosis [Petechial Hemorrhages (___cm)] : no petechial hemorrhages [Skin Color & Pigmentation] : normal skin color and pigmentation [Skin Turgor] : normal skin turgor [] : no rash [Deep Tendon Reflexes (DTR)] : deep tendon reflexes were 2+ and symmetric [No Focal Deficits] : no focal deficits [Sensation] : the sensory exam was normal to light touch and pinprick [Oriented To Time, Place, And Person] : oriented to person, place, and time [Affect] : the affect was normal [Impaired Insight] : insight and judgment were intact

## 2020-02-11 NOTE — REVIEW OF SYSTEMS
[Dyspnea] : dyspnea [Hypertension] : ~T hypertension [Dysrhythmia] : dysrhythmia [Edema] : ~T edema was present [Dizziness] : dizziness [As Noted in HPI] : as noted in HPI [Negative] : Endocrine

## 2020-02-12 NOTE — HISTORY OF PRESENT ILLNESS
[FreeTextEntry1] : This letter  is regarding your patient  who  attended pulmonary out patient office today.  I have reviewed  patient's  past history, social history, family history and medication list. I also  reviewed nurse practitioners/ and fellows  notes and assessment and agree with it.  \par The patient was referred by  for puml htn ---HAS MANY PETS AT HOME \par \par This is a 69-year-old male with prior medical history significant for atypical chest pain. Patient recently had cardiac catheterization that revealed some elevated right-sided heart pressures. He complains of swelling increasing dyspnea on exertion that has gotten worse over the past several years. He states that he now has difficulty with climbing stairs. He reportedly had a CTA of the chest that was within normal limits.   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 Questionable history of Raynaud's phenomenon. No h/o significant weight loss in last few months. No history of liver dysfunction , collagen vascular disorder or chronic thromboembolic disease. I would classify the patient's dyspnea as WHO  FUNCTIONAL CLASS II--------\par \par ----Echo  date--unavailable----\par ----Pft date---1/2020- normal lung volumes------NORMAL DLCO \par ---6MWT  355 METERS , NO DESATURATION\par ----Chest x-ray within normal limits. Patient to bring in copy of CAT scan of chest. \par ----Cath date----2019 Cardiac catheter note reveals elevated right-sided heart pressures with RV 51/17 mean arterial pressure 23, RA pressures 23/18 MAP 17  --mean pa=36, pcwp 21, co=8.42 polo, qqy=264------\par \par \par feb 2020 - cough, dyspnea some what improved after prednisone and antibiotics\par Labs showed high IGE level- pt reports multiple pets at home\par Fungitell level elevated - pt reports mold exposure in his home -FELT BETTER ON STEROIDS----------------H/O SECONDARY PULM HTN

## 2020-02-12 NOTE — DISCUSSION/SUMMARY
[FreeTextEntry1] : ---Assessment plan----------The patient has been referred here for further opinion regarding pulmonary problem,\par 70 yo referred for SECONDARY  pulm htn, PMH   ?CAD , SVT, GLENDY untreated--HIGH IgE---lab posuitieb fingitell  test cause ????,  /Ch HP  \par 1)  labs drawn in our office today--repeat fungitell --IF IT IS REPORTED POSITIEV  WILL NEED A BRONCH \par 2) ?CH HYPERSENSITIVITY PNEUMONITIS--- c/o cough- continue  prednisone 10mg\par 3) low vit b12- s/p vit b12 injection today n our office\par 4) high IGE level- start singulair\par 5) repeat CT hcest\par 6) f/u in 6 weeks\par 7) overnight pulse ox- r/o GLENDY\par Thanks for allowing  me to participate  in the care of this patient.  Patient at this time  will follow  the above mentioned recommendations and return back for follow up visit. If you have any questions  I can be reached  at #[phone] (beeper)  or  [phone] (office).\par \par Sherlyn Hollis MD, FCCP \par Director, Pulmonary Hypertension Program \par [facility] \par Division of Pulmonary, Critical Care and Sleep Medicine \par  Professor of Medicine \par [employer] [facility] [employer]\par

## 2020-02-13 LAB — FUNGITELL QUANTITATIVE VALUE: <31 PG/ML

## 2020-02-14 ENCOUNTER — APPOINTMENT (OUTPATIENT)
Dept: CT IMAGING | Facility: HOSPITAL | Age: 70
End: 2020-02-14
Payer: COMMERCIAL

## 2020-02-14 ENCOUNTER — OUTPATIENT (OUTPATIENT)
Dept: OUTPATIENT SERVICES | Facility: HOSPITAL | Age: 70
LOS: 1 days | End: 2020-02-14
Payer: COMMERCIAL

## 2020-02-14 DIAGNOSIS — Z98.890 OTHER SPECIFIED POSTPROCEDURAL STATES: Chronic | ICD-10-CM

## 2020-02-14 DIAGNOSIS — R05 COUGH: ICD-10-CM

## 2020-02-14 DIAGNOSIS — Z90.49 ACQUIRED ABSENCE OF OTHER SPECIFIED PARTS OF DIGESTIVE TRACT: Chronic | ICD-10-CM

## 2020-02-14 PROCEDURE — 71250 CT THORAX DX C-: CPT | Mod: 26

## 2020-02-14 PROCEDURE — 71250 CT THORAX DX C-: CPT

## 2020-03-16 ENCOUNTER — APPOINTMENT (OUTPATIENT)
Dept: PULMONOLOGY | Facility: CLINIC | Age: 70
End: 2020-03-16

## 2020-03-23 ENCOUNTER — APPOINTMENT (OUTPATIENT)
Dept: PULMONOLOGY | Facility: CLINIC | Age: 70
End: 2020-03-23

## 2020-04-24 ENCOUNTER — APPOINTMENT (OUTPATIENT)
Dept: PULMONOLOGY | Facility: CLINIC | Age: 70
End: 2020-04-24
Payer: COMMERCIAL

## 2020-04-24 PROCEDURE — 99443: CPT

## 2020-04-24 NOTE — HISTORY OF PRESENT ILLNESS
[Home] : at home, [unfilled] , at the time of the visit. [Other Location: e.g. Home (Enter Location, City,State)___] : at [unfilled] [Patient] : the patient [Self] : self [TextBox_4] : well  telehealth visit via PHONE w/pt Dr Hollis and Kelsi NP\par 68 yo referred for SECONDARY pulm htn, PMH ?CAD , SVT, GLENDY untreated--HIGH IgE--Ch HP \par \par requested TEB - states he feels worse since stopping prednisone and singulair- more short of breath , afebrile

## 2020-04-24 NOTE — DISCUSSION/SUMMARY
[FreeTextEntry1] : ---Assessment plan----------The patient has been referred here for further opinion regarding pulmonary problem,\par 70 yo referred for SECONDARY pulm htn, PMH ?CAD , SVT, GLENDY untreated--HIGH IgE---lab posuitieb fingitell test cause ????, /Ch HP \par \par 1) restart singulair\par 2) prednisone 20mg taper every 5 days by 5mg\par 3) OSA_ will ultimately need psg- not currently on cpap\par 4) reviewed covid precautions\par 5) f/u in 1 week\par \par Thanks for allowing  me to participate  in the care of this patient.  Patient at this time  will follow  the above mentioned recommendations and return back for follow up visit. If you have any questions  I can be reached  at #[phone] (beeper)  or  [phone] (office).\par \par Sherlyn Hollis MD, FCCP \par Director, Pulmonary Hypertension Program \par [facility] \par Division of Pulmonary, Critical Care and Sleep Medicine \par  Professor of Medicine \par [employer] [facility] [employer]\par

## 2020-05-01 ENCOUNTER — APPOINTMENT (OUTPATIENT)
Dept: PULMONOLOGY | Facility: CLINIC | Age: 70
End: 2020-05-01

## 2020-06-01 ENCOUNTER — APPOINTMENT (OUTPATIENT)
Dept: PULMONOLOGY | Facility: CLINIC | Age: 70
End: 2020-06-01
Payer: COMMERCIAL

## 2020-06-01 PROCEDURE — 99443: CPT

## 2020-06-01 RX ORDER — TORSEMIDE 20 MG/1
20 TABLET ORAL
Qty: 180 | Refills: 0 | Status: ACTIVE | COMMUNITY
Start: 2020-04-14

## 2020-06-01 NOTE — HISTORY OF PRESENT ILLNESS
[Other Location: e.g. Home (Enter Location, City,State)___] : at [unfilled] [TextBox_4] : [facility]  telehealth visit via PHONE w/pt Dr Hollis and Kelsi NP\par 68 yo referred for SECONDARY pulm htn, PMH ?CAD , SVT, GLENDY untreated--HIGH IgE--Ch HP \par \par requested TEB - states he feels worse since stopping prednisone and singulair- more short of breath , afebrile\par \par cath at [facility]  dr martinez ,  for secondary pulmonary  htn,   , torsamide 20 2 tabs po bid\par

## 2020-06-01 NOTE — DISCUSSION/SUMMARY
[FreeTextEntry1] : ---Assessment plan----------The patient has been referred here for further opinion regarding pulmonary problem,\par 68 yo referred for SECONDARY pulm htn, PMH ?CAD , SVT, GLENDY untreated--HIGH IgE---  asthma  vs, /Ch HP \par ---2 cats , dogs at home \par \par 1) restart singulair\par 2) prednisone  15 mg taper every 5 days by 5mg \par 3) OSA_ will ultimately need psg- not currently on cpap\par 4) reviewed covid precautions\par 5) f/u in  3 week  \par - 6 labs \par \par Thanks for allowing  me to participate  in the care of this patient.  Patient at this time  will follow  the above mentioned recommendations and return back for follow up visit. If you have any questions  I can be reached  at #[phone] (beeper)  or  [phone] (office).\par \par Sherlyn Hollis MD, FCCP \par Director, Pulmonary Hypertension Program \par [facility] \par Division of Pulmonary, Critical Care and Sleep Medicine \par  Professor of Medicine \par [employer] [facility] [employer]\par

## 2020-06-11 RX ORDER — DICLOFENAC SODIUM 10 MG/G
1 GEL TOPICAL DAILY
Qty: 2 | Refills: 2 | Status: ACTIVE | COMMUNITY
Start: 2019-11-04 | End: 1900-01-01

## 2020-06-22 ENCOUNTER — RX RENEWAL (OUTPATIENT)
Age: 70
End: 2020-06-22

## 2020-06-27 ENCOUNTER — NON-APPOINTMENT (OUTPATIENT)
Age: 70
End: 2020-06-27

## 2020-06-28 ENCOUNTER — RX RENEWAL (OUTPATIENT)
Age: 70
End: 2020-06-28

## 2020-06-30 ENCOUNTER — APPOINTMENT (OUTPATIENT)
Dept: PULMONOLOGY | Facility: CLINIC | Age: 70
End: 2020-06-30

## 2020-07-14 ENCOUNTER — OUTPATIENT (OUTPATIENT)
Dept: OUTPATIENT SERVICES | Facility: HOSPITAL | Age: 70
LOS: 1 days | End: 2020-07-14
Payer: COMMERCIAL

## 2020-07-14 ENCOUNTER — APPOINTMENT (OUTPATIENT)
Dept: ULTRASOUND IMAGING | Facility: HOSPITAL | Age: 70
End: 2020-07-14
Payer: COMMERCIAL

## 2020-07-14 DIAGNOSIS — Z98.890 OTHER SPECIFIED POSTPROCEDURAL STATES: Chronic | ICD-10-CM

## 2020-07-14 DIAGNOSIS — N28.9 DISORDER OF KIDNEY AND URETER, UNSPECIFIED: ICD-10-CM

## 2020-07-14 DIAGNOSIS — Z90.49 ACQUIRED ABSENCE OF OTHER SPECIFIED PARTS OF DIGESTIVE TRACT: Chronic | ICD-10-CM

## 2020-07-14 PROCEDURE — 76770 US EXAM ABDO BACK WALL COMP: CPT | Mod: 26

## 2020-07-14 PROCEDURE — 76770 US EXAM ABDO BACK WALL COMP: CPT

## 2020-08-10 ENCOUNTER — RX RENEWAL (OUTPATIENT)
Age: 70
End: 2020-08-10

## 2020-08-27 ENCOUNTER — TRANSCRIPTION ENCOUNTER (OUTPATIENT)
Age: 70
End: 2020-08-27

## 2020-10-05 ENCOUNTER — OUTPATIENT (OUTPATIENT)
Dept: OUTPATIENT SERVICES | Facility: HOSPITAL | Age: 70
LOS: 1 days | End: 2020-10-05
Payer: COMMERCIAL

## 2020-10-05 ENCOUNTER — APPOINTMENT (OUTPATIENT)
Dept: ULTRASOUND IMAGING | Facility: HOSPITAL | Age: 70
End: 2020-10-05
Payer: COMMERCIAL

## 2020-10-05 DIAGNOSIS — Z98.890 OTHER SPECIFIED POSTPROCEDURAL STATES: Chronic | ICD-10-CM

## 2020-10-05 DIAGNOSIS — Z00.8 ENCOUNTER FOR OTHER GENERAL EXAMINATION: ICD-10-CM

## 2020-10-05 DIAGNOSIS — Z90.49 ACQUIRED ABSENCE OF OTHER SPECIFIED PARTS OF DIGESTIVE TRACT: Chronic | ICD-10-CM

## 2020-10-05 PROCEDURE — 76870 US EXAM SCROTUM: CPT | Mod: 26

## 2020-10-05 PROCEDURE — 76870 US EXAM SCROTUM: CPT

## 2020-10-13 ENCOUNTER — NON-APPOINTMENT (OUTPATIENT)
Age: 70
End: 2020-10-13

## 2020-10-13 ENCOUNTER — RX RENEWAL (OUTPATIENT)
Age: 70
End: 2020-10-13

## 2020-10-26 ENCOUNTER — OUTPATIENT (OUTPATIENT)
Dept: OUTPATIENT SERVICES | Facility: HOSPITAL | Age: 70
LOS: 1 days | End: 2020-10-26
Payer: COMMERCIAL

## 2020-10-26 ENCOUNTER — APPOINTMENT (OUTPATIENT)
Dept: MRI IMAGING | Facility: HOSPITAL | Age: 70
End: 2020-10-26
Payer: COMMERCIAL

## 2020-10-26 DIAGNOSIS — Z98.890 OTHER SPECIFIED POSTPROCEDURAL STATES: Chronic | ICD-10-CM

## 2020-10-26 DIAGNOSIS — Z90.49 ACQUIRED ABSENCE OF OTHER SPECIFIED PARTS OF DIGESTIVE TRACT: Chronic | ICD-10-CM

## 2020-10-26 DIAGNOSIS — Z00.8 ENCOUNTER FOR OTHER GENERAL EXAMINATION: ICD-10-CM

## 2020-10-26 PROCEDURE — A9579: CPT

## 2020-10-26 PROCEDURE — 74183 MRI ABD W/O CNTR FLWD CNTR: CPT | Mod: 26

## 2020-10-26 PROCEDURE — 74183 MRI ABD W/O CNTR FLWD CNTR: CPT

## 2020-11-27 DIAGNOSIS — Z01.818 ENCOUNTER FOR OTHER PREPROCEDURAL EXAMINATION: ICD-10-CM

## 2020-11-28 ENCOUNTER — APPOINTMENT (OUTPATIENT)
Dept: DISASTER EMERGENCY | Facility: CLINIC | Age: 70
End: 2020-11-28

## 2020-11-29 LAB — SARS-COV-2 N GENE NPH QL NAA+PROBE: NOT DETECTED

## 2020-11-30 ENCOUNTER — FORM ENCOUNTER (OUTPATIENT)
Age: 70
End: 2020-11-30

## 2020-12-01 ENCOUNTER — OUTPATIENT (OUTPATIENT)
Dept: OUTPATIENT SERVICES | Facility: HOSPITAL | Age: 70
LOS: 1 days | End: 2020-12-01
Payer: COMMERCIAL

## 2020-12-01 ENCOUNTER — APPOINTMENT (OUTPATIENT)
Dept: SLEEP CENTER | Facility: CLINIC | Age: 70
End: 2020-12-01
Payer: COMMERCIAL

## 2020-12-01 DIAGNOSIS — Z90.49 ACQUIRED ABSENCE OF OTHER SPECIFIED PARTS OF DIGESTIVE TRACT: Chronic | ICD-10-CM

## 2020-12-01 DIAGNOSIS — Z98.890 OTHER SPECIFIED POSTPROCEDURAL STATES: Chronic | ICD-10-CM

## 2020-12-01 PROCEDURE — 95810 POLYSOM 6/> YRS 4/> PARAM: CPT | Mod: 26

## 2020-12-01 PROCEDURE — 95810 POLYSOM 6/> YRS 4/> PARAM: CPT

## 2020-12-02 DIAGNOSIS — G47.33 OBSTRUCTIVE SLEEP APNEA (ADULT) (PEDIATRIC): ICD-10-CM

## 2020-12-03 ENCOUNTER — APPOINTMENT (OUTPATIENT)
Dept: PULMONOLOGY | Facility: CLINIC | Age: 70
End: 2020-12-03

## 2020-12-10 ENCOUNTER — NON-APPOINTMENT (OUTPATIENT)
Age: 70
End: 2020-12-10

## 2020-12-18 ENCOUNTER — RX RENEWAL (OUTPATIENT)
Age: 70
End: 2020-12-18

## 2021-02-17 ENCOUNTER — RX RENEWAL (OUTPATIENT)
Age: 71
End: 2021-02-17

## 2021-02-18 ENCOUNTER — RX RENEWAL (OUTPATIENT)
Age: 71
End: 2021-02-18

## 2021-03-05 ENCOUNTER — TRANSCRIPTION ENCOUNTER (OUTPATIENT)
Age: 71
End: 2021-03-05

## 2021-05-12 ENCOUNTER — RX RENEWAL (OUTPATIENT)
Age: 71
End: 2021-05-12

## 2021-05-12 RX ORDER — DICLOFENAC SODIUM 1% 10 MG/G
1 GEL TOPICAL
Qty: 200 | Refills: 2 | Status: ACTIVE | COMMUNITY
Start: 2021-02-17 | End: 1900-01-01

## 2021-05-24 ENCOUNTER — APPOINTMENT (OUTPATIENT)
Dept: PULMONOLOGY | Facility: CLINIC | Age: 71
End: 2021-05-24
Payer: COMMERCIAL

## 2021-05-24 ENCOUNTER — RX CHANGE (OUTPATIENT)
Age: 71
End: 2021-05-24

## 2021-05-24 ENCOUNTER — LABORATORY RESULT (OUTPATIENT)
Age: 71
End: 2021-05-24

## 2021-05-24 VITALS
OXYGEN SATURATION: 99 % | SYSTOLIC BLOOD PRESSURE: 132 MMHG | BODY MASS INDEX: 30.48 KG/M2 | DIASTOLIC BLOOD PRESSURE: 87 MMHG | TEMPERATURE: 97.4 F | HEART RATE: 121 BPM | HEIGHT: 72 IN | WEIGHT: 225 LBS

## 2021-05-24 DIAGNOSIS — R76.8 OTHER SPECIFIED ABNORMAL IMMUNOLOGICAL FINDINGS IN SERUM: ICD-10-CM

## 2021-05-24 DIAGNOSIS — R42 DIZZINESS AND GIDDINESS: ICD-10-CM

## 2021-05-24 LAB
BASOPHILS # BLD AUTO: 0.06 K/UL
BASOPHILS NFR BLD AUTO: 0.6 %
COVID-19 SPIKE DOMAIN ANTIBODY INTERPRETATION: POSITIVE
EOSINOPHIL # BLD AUTO: 0.19 K/UL
EOSINOPHIL NFR BLD AUTO: 1.9 %
HCT VFR BLD CALC: 41.7 %
HGB BLD-MCNC: 13 G/DL
IMM GRANULOCYTES NFR BLD AUTO: 0.8 %
LYMPHOCYTES # BLD AUTO: 2.48 K/UL
LYMPHOCYTES NFR BLD AUTO: 24.7 %
MAN DIFF?: NORMAL
MCHC RBC-ENTMCNC: 29.8 PG
MCHC RBC-ENTMCNC: 31.2 GM/DL
MCV RBC AUTO: 95.6 FL
MONOCYTES # BLD AUTO: 0.87 K/UL
MONOCYTES NFR BLD AUTO: 8.7 %
NEUTROPHILS # BLD AUTO: 6.37 K/UL
NEUTROPHILS NFR BLD AUTO: 63.3 %
NT-PROBNP SERPL-MCNC: 52 PG/ML
PLATELET # BLD AUTO: 376 K/UL
RBC # BLD: 4.36 M/UL
RBC # FLD: 13 %
SARS-COV-2 AB SERPL IA-ACNC: >250 U/ML
WBC # FLD AUTO: 10.05 K/UL

## 2021-05-24 PROCEDURE — 99215 OFFICE O/P EST HI 40 MIN: CPT | Mod: 25

## 2021-05-24 PROCEDURE — 99072 ADDL SUPL MATRL&STAF TM PHE: CPT

## 2021-05-24 PROCEDURE — 36415 COLL VENOUS BLD VENIPUNCTURE: CPT

## 2021-05-24 RX ORDER — SPIRONOLACTONE 25 MG/1
25 TABLET ORAL
Qty: 12 | Refills: 3 | Status: DISCONTINUED | COMMUNITY
Start: 2020-06-01 | End: 2021-05-24

## 2021-05-24 RX ORDER — PREDNISONE 10 MG/1
10 TABLET ORAL
Qty: 30 | Refills: 1 | Status: DISCONTINUED | COMMUNITY
Start: 2020-01-23 | End: 2021-05-24

## 2021-05-24 RX ORDER — PREGABALIN 75 MG/1
75 CAPSULE ORAL
Qty: 90 | Refills: 0 | Status: ACTIVE | COMMUNITY
Start: 2021-02-22

## 2021-05-24 RX ORDER — DIFLUPREDNATE 0.5 MG/ML
0.05 EMULSION OPHTHALMIC
Qty: 5 | Refills: 0 | Status: ACTIVE | COMMUNITY
Start: 2021-05-10

## 2021-05-24 RX ORDER — POTASSIUM CHLORIDE 1500 MG/1
20 TABLET, EXTENDED RELEASE ORAL
Qty: 60 | Refills: 0 | Status: ACTIVE | COMMUNITY
Start: 2021-05-19

## 2021-05-24 RX ORDER — OMEPRAZOLE 40 MG/1
40 CAPSULE, DELAYED RELEASE ORAL
Qty: 180 | Refills: 0 | Status: ACTIVE | COMMUNITY
Start: 2021-03-10

## 2021-05-24 RX ORDER — TORSEMIDE 20 MG/1
20 TABLET ORAL
Refills: 0 | Status: ACTIVE | COMMUNITY

## 2021-05-24 RX ORDER — ALLOPURINOL 300 MG/1
300 TABLET ORAL
Qty: 90 | Refills: 0 | Status: ACTIVE | COMMUNITY
Start: 2021-03-10

## 2021-05-24 RX ORDER — ATORVASTATIN CALCIUM 20 MG/1
20 TABLET, FILM COATED ORAL
Qty: 90 | Refills: 0 | Status: ACTIVE | COMMUNITY
Start: 2021-01-27

## 2021-05-24 RX ORDER — ALLOPURINOL 100 MG/1
100 TABLET ORAL
Qty: 180 | Refills: 0 | Status: ACTIVE | COMMUNITY
Start: 2021-01-31

## 2021-05-24 RX ORDER — BROMFENAC SODIUM 0.7 MG/ML
0.07 SOLUTION/ DROPS OPHTHALMIC
Qty: 3 | Refills: 0 | Status: ACTIVE | COMMUNITY
Start: 2021-05-10

## 2021-05-24 RX ORDER — OFLOXACIN 3 MG/ML
0.3 SOLUTION/ DROPS OPHTHALMIC
Qty: 5 | Refills: 0 | Status: ACTIVE | COMMUNITY
Start: 2021-05-10

## 2021-05-24 RX ORDER — PREDNISONE 2.5 MG/1
2.5 TABLET ORAL DAILY
Qty: 60 | Refills: 0 | Status: DISCONTINUED | COMMUNITY
Start: 2020-06-01 | End: 2021-05-24

## 2021-05-24 RX ORDER — MONTELUKAST 10 MG/1
10 TABLET, FILM COATED ORAL
Qty: 1 | Refills: 3 | Status: DISCONTINUED | COMMUNITY
Start: 2020-02-11 | End: 2021-05-24

## 2021-05-24 RX ORDER — CARISOPRODOL 250 MG/1
TABLET ORAL
Refills: 0 | Status: DISCONTINUED | COMMUNITY
End: 2021-05-24

## 2021-05-24 RX ORDER — MONTELUKAST 10 MG/1
10 TABLET, FILM COATED ORAL
Qty: 30 | Refills: 2 | Status: ACTIVE | COMMUNITY
Start: 2021-05-24 | End: 1900-01-01

## 2021-05-24 RX ORDER — COLCHICINE 0.6 MG/1
0.6 CAPSULE ORAL
Qty: 30 | Refills: 0 | Status: ACTIVE | COMMUNITY
Start: 2021-04-25

## 2021-05-24 RX ORDER — UBROGEPANT 50 MG/1
50 TABLET ORAL
Qty: 10 | Refills: 0 | Status: ACTIVE | COMMUNITY
Start: 2021-04-22

## 2021-05-24 RX ORDER — MONTELUKAST 10 MG/1
10 TABLET, FILM COATED ORAL
Qty: 30 | Refills: 2 | Status: DISCONTINUED | COMMUNITY
Start: 2021-05-24 | End: 2021-05-24

## 2021-05-24 RX ORDER — FUROSEMIDE 80 MG/1
80 TABLET ORAL
Refills: 0 | Status: DISCONTINUED | COMMUNITY
End: 2021-05-24

## 2021-05-24 NOTE — PHYSICAL EXAM
[No Acute Distress] : no acute distress [Normal Oropharynx] : normal oropharynx [III] : Mallampati Class: III [Normal Appearance] : normal appearance [Normal S1, S2] : normal s1, s2 [Benign] : benign [No Clubbing] : no clubbing [Normal Color/ Pigmentation] : normal color/ pigmentation [No Focal Deficits] : no focal deficits [Oriented x3] : oriented x3 [TextBox_68] : Decreased entry at bases

## 2021-05-24 NOTE — DISCUSSION/SUMMARY
[FreeTextEntry1] : ---Assessment plan----------The patient has been referred here for further opinion regarding pulmonary problem,\par 71 yo referred for SECONDARY pulm htn, PMH ?CAD , SVT, GLENDY untreated--HIGH IgE---  asthma \par ---2 cats , dogs at home \par \par 1) restart singulair\par 2) off prednisone\par 3) OSA_ --NEEDS  cpap\par 4) reviewed covid precautions\par 5) secondary pulmonary hypertension--- keep the patient euvolemic--\par 6] f/u in  3 months\par - 6 labs \par \par Thanks for allowing  me to participate  in the care of this patient.  Patient at this time  will follow  the above mentioned recommendations and return back for follow up visit. If you have any questions  I can be reached  at #[phone] (beeper)  or  [phone] (office).\par \par Sherlyn Hollis MD, FCCP \par Director, Pulmonary Hypertension Program \par [facility] \par Division of Pulmonary, Critical Care and Sleep Medicine \par  Professor of Medicine \par [facility] [employer] [facility] [employer]\par

## 2021-05-24 NOTE — HISTORY OF PRESENT ILLNESS
[TextBox_4] : This letter  is regarding your patient  who  attended pulmonary out patient office today.  I have reviewed  patient's  past history, social history, family history and medication list. I also  reviewed nurse practitioners/ and fellows  notes and assessment and agree with it.  \par The patient was referred by  for puml htn ---HAS MANY PETS AT HOME \par \par This is a 70  -year-old male with prior medical history significant for atypical chest pain. Patient recently had cardiac catheterization that revealed some elevated right-sided heart pressures. He complains of swelling increasing dyspnea on exertion that has gotten worse over the past several years. He states that he now has difficulty with climbing stairs. He reportedly had a CTA of the chest that was within normal limits.   \par  \par [facility]- svt- s/p ablation w/Dr Mcmanus 2017, sera- diagnosed 2008- untreated- unable to tolerate cpap\par \par ------No history of , fever, chills , rigors, chest pain, or hemoptysis. Questionable history of Raynaud's phenomenon. No h/o significant weight loss in last few months. No history of liver dysfunction , collagen vascular disorder or chronic thromboembolic disease. I would classify the patient's dyspnea as WHO  FUNCTIONAL CLASS II--------\par \par ----Echo  date--unavailable----\par ----Pft date---1/2020- normal lung volumes------NORMAL DLCO \par ---6MWT  355 METERS , NO DESATURATION\par \par \par \par PSG  [id number]----BBSJXBM OFSEVERE---  SERA ---AHI 35   \par ----Chest x-ray within normal limits.\par \par -------  EXAM: CT CHEST -----PROCEDURE DATE: 02/14/2020 ------\par INTERPRETATION: CT CHEST WITHOUT CONTRAST \par INDICATION: Shortness of breath. Cough. \par COMPARISON: CT chest 10/5/2016. CT chest 9/21/2016. \par \par Lungs And Airways: A calcified left upper lobe nodule is unchanged. A 2 mm \par opacity along the left major fissure is unchanged and represents a pulmonary \par lymph node. The remainder of the lungs are clear. The airways are normal. \par Mediastinum: There are no enlarged chest lymph nodes. The visualized portion \par of the thyroid gland is unremarkable. Esophagus is unremarkable. \par \par Heart and Vasculature: The heart is normal in size. Coronary artery \par calcifications. No pericardial effusion. The aortic root measures 4.2 cm at \par the sinuses of Valsalva. The ascending aorta measures 4.8 cm at the main \par pulmonary artery. The main pulmonary artery is enlarged which can indicate \par pulmonary arterial hypertension. \par \par \par \par IMPRESSION: \par \par 1. The ascending aorta measures 4.8 cm at the main pulmonary artery. \par 2. Calcified nodule in the left upper lobe is unchanged. \par \par ----Cath date----2019 Cardiac catheter note reveals elevated right-sided heart pressures with RV 51/17 mean arterial pressure 23, RA pressures 23/18 MAP 17  --mean pa=36, pcwp 21, co=8.42 polo, tbi=058------\par \par \par feb 2020 - cough, dyspnea some what improved after prednisone and antibiotics\par Labs showed high IGE level- pt reports multiple pets at home\par Fungitell level elevated - pt reports mold exposure in his home -FELT BETTER ON STEROIDS----------------H/O SECONDARY PULM HTN \par \par \par MAY 2021-----diastolic dysfunction with secondary pulmonary hypertension.. History of 3 dogs and 2 pet cats at home------- history of asthma-------- being managed by cardiologist at [facility]

## 2021-05-25 LAB
ALBUMIN SERPL ELPH-MCNC: 4.5 G/DL
ALP BLD-CCNC: 108 U/L
ALT SERPL-CCNC: 12 U/L
ANION GAP SERPL CALC-SCNC: 19 MMOL/L
AST SERPL-CCNC: 20 U/L
BILIRUB SERPL-MCNC: 0.5 MG/DL
BUN SERPL-MCNC: 18 MG/DL
CALCIUM SERPL-MCNC: 10 MG/DL
CHLORIDE SERPL-SCNC: 102 MMOL/L
CO2 SERPL-SCNC: 18 MMOL/L
CREAT SERPL-MCNC: 1.22 MG/DL
GLUCOSE SERPL-MCNC: 122 MG/DL
POTASSIUM SERPL-SCNC: 5.5 MMOL/L
PROT SERPL-MCNC: 7 G/DL
SODIUM SERPL-SCNC: 140 MMOL/L

## 2021-05-27 LAB — TOTAL IGE SMQN RAST: 795 KU/L

## 2021-05-31 LAB
A ALTERNATA IGE QN: <0.1 KUA/L
A FUMIGATUS IGE QN: <0.1 KUA/L
BERMUDA GRASS IGE QN: <0.1 KUA/L
BOXELDER IGE QN: <0.1 KUA/L
C HERBARUM IGE QN: <0.1 KUA/L
CALIF WALNUT IGE QN: <0.1 KUA/L
CAT DANDER IGE QN: <0.1 KUA/L
CMN PIGWEED IGE QN: <0.1 KUA/L
COMMON RAGWEED IGE QN: 0.13 KUA/L
COTTONWOOD IGE QN: <0.1 KUA/L
D FARINAE IGE QN: 0.38 KUA/L
D PTERONYSS IGE QN: 0.42 KUA/L
DEPRECATED A ALTERNATA IGE RAST QL: 0
DEPRECATED A FUMIGATUS IGE RAST QL: 0
DEPRECATED BERMUDA GRASS IGE RAST QL: 0
DEPRECATED BOXELDER IGE RAST QL: 0
DEPRECATED C HERBARUM IGE RAST QL: 0
DEPRECATED CAT DANDER IGE RAST QL: 0
DEPRECATED COMMON PIGWEED IGE RAST QL: 0
DEPRECATED COMMON RAGWEED IGE RAST QL: NORMAL
DEPRECATED COTTONWOOD IGE RAST QL: 0
DEPRECATED D FARINAE IGE RAST QL: 1
DEPRECATED D PTERONYSS IGE RAST QL: 1
DEPRECATED DOG DANDER IGE RAST QL: 0
DEPRECATED GOOSEFOOT IGE RAST QL: NORMAL
DEPRECATED LONDON PLANE IGE RAST QL: 0
DEPRECATED MOUSE URINE PROT IGE RAST QL: 0
DEPRECATED MUGWORT IGE RAST QL: 0
DEPRECATED P NOTATUM IGE RAST QL: 0
DEPRECATED RED CEDAR IGE RAST QL: NORMAL
DEPRECATED ROACH IGE RAST QL: 2
DEPRECATED SHEEP SORREL IGE RAST QL: NORMAL
DEPRECATED SILVER BIRCH IGE RAST QL: 0
DEPRECATED TIMOTHY IGE RAST QL: NORMAL
DEPRECATED WHITE ASH IGE RAST QL: 0
DEPRECATED WHITE OAK IGE RAST QL: NORMAL
DOG DANDER IGE QN: <0.1 KUA/L
GOOSEFOOT IGE QN: 0.18 KUA/L
LONDON PLANE IGE QN: <0.1 KUA/L
MOUSE URINE PROT IGE QN: <0.1 KUA/L
MUGWORT IGE QN: <0.1 KUA/L
MULBERRY (T70) CLASS: 0
MULBERRY (T70) CONC: <0.1 KUA/L
P NOTATUM IGE QN: <0.1 KUA/L
RED CEDAR IGE QN: 0.13 KUA/L
ROACH IGE QN: 1.41 KUA/L
SHEEP SORREL IGE QN: 0.13 KUA/L
SILVER BIRCH IGE QN: <0.1 KUA/L
TIMOTHY IGE QN: 0.13 KUA/L
TREE ALLERG MIX1 IGE QL: 0
WHITE ASH IGE QN: <0.1 KUA/L
WHITE ELM IGE QN: 0
WHITE ELM IGE QN: <0.1 KUA/L
WHITE OAK IGE QN: 0.12 KUA/L

## 2021-06-18 NOTE — DISCHARGE NOTE ADULT - PROVIDER RX CONTACT NUMBER
Summary of plan:  1. Refer to orthopedics  2. Follow up with me as already scheduled on 12/7/21     [phone]

## 2021-07-06 ENCOUNTER — APPOINTMENT (OUTPATIENT)
Dept: ORTHOPEDIC SURGERY | Facility: CLINIC | Age: 71
End: 2021-07-06
Payer: COMMERCIAL

## 2021-07-06 VITALS
HEIGHT: 72 IN | BODY MASS INDEX: 30.48 KG/M2 | DIASTOLIC BLOOD PRESSURE: 96 MMHG | HEART RATE: 99 BPM | SYSTOLIC BLOOD PRESSURE: 129 MMHG | WEIGHT: 225 LBS

## 2021-07-06 DIAGNOSIS — Z96.653 PRESENCE OF ARTIFICIAL KNEE JOINT, BILATERAL: ICD-10-CM

## 2021-07-06 PROCEDURE — 99214 OFFICE O/P EST MOD 30 MIN: CPT

## 2021-07-06 PROCEDURE — 99072 ADDL SUPL MATRL&STAF TM PHE: CPT

## 2021-07-06 PROCEDURE — 73562 X-RAY EXAM OF KNEE 3: CPT | Mod: LT

## 2021-07-16 ENCOUNTER — OUTPATIENT (OUTPATIENT)
Dept: OUTPATIENT SERVICES | Facility: HOSPITAL | Age: 71
LOS: 1 days | End: 2021-07-16
Payer: COMMERCIAL

## 2021-07-16 ENCOUNTER — APPOINTMENT (OUTPATIENT)
Dept: RADIOLOGY | Facility: HOSPITAL | Age: 71
End: 2021-07-16
Payer: COMMERCIAL

## 2021-07-16 DIAGNOSIS — Z00.8 ENCOUNTER FOR OTHER GENERAL EXAMINATION: ICD-10-CM

## 2021-07-16 DIAGNOSIS — Z98.890 OTHER SPECIFIED POSTPROCEDURAL STATES: Chronic | ICD-10-CM

## 2021-07-16 DIAGNOSIS — Z90.49 ACQUIRED ABSENCE OF OTHER SPECIFIED PARTS OF DIGESTIVE TRACT: Chronic | ICD-10-CM

## 2021-07-16 PROCEDURE — 72040 X-RAY EXAM NECK SPINE 2-3 VW: CPT

## 2021-07-16 PROCEDURE — 72040 X-RAY EXAM NECK SPINE 2-3 VW: CPT | Mod: 26

## 2021-07-16 PROCEDURE — 73030 X-RAY EXAM OF SHOULDER: CPT

## 2021-07-16 PROCEDURE — 73030 X-RAY EXAM OF SHOULDER: CPT | Mod: 26,50

## 2021-07-20 ENCOUNTER — APPOINTMENT (OUTPATIENT)
Dept: ORTHOPEDIC SURGERY | Facility: CLINIC | Age: 71
End: 2021-07-20
Payer: COMMERCIAL

## 2021-07-20 VITALS — HEIGHT: 72 IN | DIASTOLIC BLOOD PRESSURE: 84 MMHG | HEART RATE: 99 BPM | SYSTOLIC BLOOD PRESSURE: 126 MMHG

## 2021-07-20 DIAGNOSIS — Z96.652 PRESENCE OF LEFT ARTIFICIAL KNEE JOINT: ICD-10-CM

## 2021-07-20 PROCEDURE — 99072 ADDL SUPL MATRL&STAF TM PHE: CPT

## 2021-07-20 PROCEDURE — 99212 OFFICE O/P EST SF 10 MIN: CPT

## 2021-07-20 PROCEDURE — 73562 X-RAY EXAM OF KNEE 3: CPT | Mod: LT

## 2021-09-11 ENCOUNTER — APPOINTMENT (OUTPATIENT)
Dept: DISASTER EMERGENCY | Facility: CLINIC | Age: 71
End: 2021-09-11

## 2021-09-13 ENCOUNTER — NON-APPOINTMENT (OUTPATIENT)
Age: 71
End: 2021-09-13

## 2021-09-13 ENCOUNTER — APPOINTMENT (OUTPATIENT)
Dept: PULMONOLOGY | Facility: CLINIC | Age: 71
End: 2021-09-13
Payer: COMMERCIAL

## 2021-09-13 VITALS
DIASTOLIC BLOOD PRESSURE: 78 MMHG | HEIGHT: 73 IN | SYSTOLIC BLOOD PRESSURE: 136 MMHG | WEIGHT: 221 LBS | RESPIRATION RATE: 15 BRPM | OXYGEN SATURATION: 100 % | BODY MASS INDEX: 29.29 KG/M2 | HEART RATE: 78 BPM | TEMPERATURE: 97 F

## 2021-09-13 DIAGNOSIS — R05 COUGH: ICD-10-CM

## 2021-09-13 DIAGNOSIS — R07.9 CHEST PAIN, UNSPECIFIED: ICD-10-CM

## 2021-09-13 DIAGNOSIS — I27.21 SECONDARY PULMONARY ARTERIAL HYPERTENSION: ICD-10-CM

## 2021-09-13 DIAGNOSIS — G47.33 OBSTRUCTIVE SLEEP APNEA (ADULT) (PEDIATRIC): ICD-10-CM

## 2021-09-13 DIAGNOSIS — R06.00 DYSPNEA, UNSPECIFIED: ICD-10-CM

## 2021-09-13 PROCEDURE — 94726 PLETHYSMOGRAPHY LUNG VOLUMES: CPT

## 2021-09-13 PROCEDURE — ZZZZZ: CPT

## 2021-09-13 PROCEDURE — 94010 BREATHING CAPACITY TEST: CPT

## 2021-09-13 PROCEDURE — 36415 COLL VENOUS BLD VENIPUNCTURE: CPT

## 2021-09-13 PROCEDURE — 94729 DIFFUSING CAPACITY: CPT

## 2021-09-13 PROCEDURE — 99215 OFFICE O/P EST HI 40 MIN: CPT | Mod: 25

## 2021-09-13 RX ORDER — ASPIRIN 81 MG/1
81 TABLET, CHEWABLE ORAL
Qty: 30 | Refills: 0 | Status: ACTIVE | COMMUNITY
Start: 2021-05-19

## 2021-09-13 RX ORDER — SENNOSIDES 8.6 MG
8.6 TABLET ORAL
Qty: 60 | Refills: 0 | Status: ACTIVE | COMMUNITY
Start: 2021-08-25

## 2021-09-13 RX ORDER — POTASSIUM CHLORIDE 1500 MG/1
20 TABLET, FILM COATED, EXTENDED RELEASE ORAL
Qty: 270 | Refills: 0 | Status: ACTIVE | COMMUNITY
Start: 2021-06-16

## 2021-09-13 RX ORDER — OXYCODONE 13.5 MG/1
13.5 CAPSULE, EXTENDED RELEASE ORAL
Qty: 60 | Refills: 0 | Status: ACTIVE | COMMUNITY
Start: 2021-08-27

## 2021-09-13 RX ORDER — BUPRENORPHINE 10 UG/H
10 PATCH, EXTENDED RELEASE TRANSDERMAL
Qty: 4 | Refills: 0 | Status: ACTIVE | COMMUNITY
Start: 2021-06-22

## 2021-09-13 RX ORDER — COLCHICINE 0.6 MG/1
0.6 TABLET ORAL
Qty: 14 | Refills: 0 | Status: ACTIVE | COMMUNITY
Start: 2021-08-12

## 2021-09-13 RX ORDER — CYCLOBENZAPRINE HYDROCHLORIDE 10 MG/1
10 TABLET, FILM COATED ORAL
Qty: 40 | Refills: 0 | Status: ACTIVE | COMMUNITY
Start: 2021-06-29

## 2021-09-13 RX ORDER — PREDNISONE 5 MG/1
5 TABLET ORAL
Qty: 30 | Refills: 0 | Status: ACTIVE | COMMUNITY
Start: 2021-09-09

## 2021-09-13 RX ORDER — BUPRENORPHINE HYDROCHLORIDE 150 UG/1
150 FILM, SOLUBLE BUCCAL
Qty: 60 | Refills: 0 | Status: ACTIVE | COMMUNITY
Start: 2021-07-23

## 2021-09-13 RX ORDER — AMLODIPINE BESYLATE 5 MG/1
5 TABLET ORAL
Qty: 90 | Refills: 0 | Status: ACTIVE | COMMUNITY
Start: 2021-05-13

## 2021-09-13 RX ORDER — RANOLAZINE 500 MG/1
500 TABLET, EXTENDED RELEASE ORAL
Qty: 180 | Refills: 0 | Status: ACTIVE | COMMUNITY
Start: 2021-06-22

## 2021-09-13 NOTE — DISCUSSION/SUMMARY
[FreeTextEntry1] : ---Assessment plan----------The patient has been referred here for further opinion regarding pulmonary problem,\par 71 yo referred for /SECONDARY pulm htn, PMH ?CAD -BUT REPEAT CTH 6/2012   SHOWS NORMAL PVR,, SVT, GLENDY untreated--HIGH IgE---  asthma \par ---2 cats , dogs at home \par \par 1) continue singulair\par 2)prednisone for gout \par 3) OSA_ --NEEDS  cpap\par 4) received covid vac/declines influenza vac\par 5) secondary pulmonary hypertension--- keep the patient euvolemic--follow-up with his cardiologist\par 6] chest pain- ekg done\par 7) including IgE level \par 8]f/u in  4-labs today\par \par Follow-up in 4 to 5 months \par \par \par Thanks for allowing  me to participate  in the care of this patient.  Patient at this time  will follow  the above mentioned recommendations and return back for follow up visit. If you have any questions  I can be reached  at #[phone] (beeper)  or  [phone] (office).\par \par Sherlyn Hollis MD, [facility] \par Director, Pulmonary Hypertension Program \par [facility] \par Division of Pulmonary, Critical Care and Sleep Medicine \par  Professor of Medicine \par [employer] [facility] [employer]\par \par \par Sherlyn Hollis MD, [facility] \par Director, Pulmonary Hypertension Program \par [facility] \par Division of Pulmonary, Critical Care and Sleep Medicine \par  Professor of Medicine \par [employer]\par

## 2021-09-13 NOTE — HISTORY OF PRESENT ILLNESS
[TextBox_4] : This letter  is regarding your patient  who  attended pulmonary out patient office today.  I have reviewed  patient's  past history, social history, family history and medication list. I also  reviewed nurse practitioners/ and fellows  notes and assessment and agree with it.  \par The patient was referred by  for puml htn ---HAS MANY PETS AT HOME \par \par This is a 70  -year-old male with prior medical history significant for atypical chest pain. Patient recently had cardiac catheterization that revealed some elevated right-sided heart pressures. He complains of swelling increasing dyspnea on exertion that has gotten worse over the past several years. He states that he now has difficulty with climbing stairs. He reportedly had a CTA of the chest that was within normal limits.   \par  \par [facility]- svt- s/p ablation w/Dr Mcmanus 2017, sera- diagnosed 2008- untreated- unable to tolerate cpap\par \par ------No history of , fever, chills , rigors, chest pain, or hemoptysis. Questionable history of Raynaud's phenomenon. No h/o significant weight loss in last few months. No history of liver dysfunction , collagen vascular disorder or chronic thromboembolic disease. I would classify the patient's dyspnea as WHO  FUNCTIONAL CLASS II--------\par \par ----Echo  date--unavailable----\par ----Pft date---1/2020- normal lung volumes------NORMAL DLCO \par pft 2021-normal lung volumes normal flow rates normal DLCO\par ---6MWT  355 METERS , NO DESATURATION\par \par \par \par PSG  [id number]----MIASMMB OFSEVERE---  SERA ---AHI 35   \par ----Chest x-ray within normal limits.\par \par -------  EXAM: CT CHEST -----PROCEDURE DATE: 02/14/2020 ------\par INTERPRETATION: CT CHEST WITHOUT CONTRAST \par INDICATION: Shortness of breath. Cough. \par COMPARISON: CT chest 10/5/2016. CT chest 9/21/2016. \par \par Lungs And Airways: A calcified left upper lobe nodule is unchanged. A 2 mm \par opacity along the left major fissure is unchanged and represents a pulmonary \par lymph node. The remainder of the lungs are clear. The airways are normal. \par Mediastinum: There are no enlarged chest lymph nodes. The visualized portion \par of the thyroid gland is unremarkable. Esophagus is unremarkable. \par \par Heart and Vasculature: The heart is normal in size. Coronary artery \par calcifications. No pericardial effusion. The aortic root measures 4.2 cm at \par the sinuses of Valsalva. The ascending aorta measures 4.8 cm at the main \par pulmonary artery. The main pulmonary artery is enlarged which can indicate \par pulmonary arterial hypertension. \par \par \par \par IMPRESSION: \par \par 1. The ascending aorta measures 4.8 cm at the main pulmonary artery. \par 2. Calcified nodule in the left upper lobe is unchanged. \par \par ----Cath date----2019 Cardiac catheter note reveals elevated right-sided heart pressures with RV 51/17 mean arterial pressure 23, RA pressures 23/18 MAP 17  --mean pa=36, pcwp 21, co=8.42 polo, fam=943------\par \par CATH 2021------  RA 8, RV 33/3 MEAN 11 , PA 33/15 MEAN PA 22,  PCWP 9, CO 7 PVR  1.85 WOODS UNIT, PA SAT 73------  \par \par \par feb 2020 - cough, dyspnea some what improved after prednisone and antibiotics\par Labs showed high IGE level- pt reports multiple pets at home\par Fungitell level elevated - pt reports mold exposure in his home -FELT BETTER ON STEROIDS----------------H/O SECONDARY PULM HTN \par \par \par MAY 2021-----diastolic dysfunction with secondary pulmonary hypertension.. History of 3 dogs and 2 pet cats at home------- history of asthma-------- being managed by cardiologist at [place]\par \par 9/2021 PFT w/normal lung volumes but test terminated as pt w/ c/o CP \par 9/2021 diastolic dysfunction with ?secondary pulmonary hypertension. --BUT REPEAT CTH 6/2012   SHOWS NORMAL PVR,     SERA- has not pursued sleep study yet.\par He is on prednisone for gout\par He has received covid vac/declines influenza vac

## 2021-09-14 LAB
ALBUMIN SERPL ELPH-MCNC: 4.3 G/DL
ALP BLD-CCNC: 88 U/L
ALT SERPL-CCNC: 9 U/L
ANION GAP SERPL CALC-SCNC: 13 MMOL/L
AST SERPL-CCNC: 14 U/L
BASOPHILS # BLD AUTO: 0.04 K/UL
BASOPHILS NFR BLD AUTO: 0.5 %
BILIRUB SERPL-MCNC: 0.2 MG/DL
BUN SERPL-MCNC: 16 MG/DL
CALCIUM SERPL-MCNC: 9.6 MG/DL
CHLORIDE SERPL-SCNC: 104 MMOL/L
CO2 SERPL-SCNC: 28 MMOL/L
CREAT SERPL-MCNC: 1.02 MG/DL
EOSINOPHIL # BLD AUTO: 0.22 K/UL
EOSINOPHIL NFR BLD AUTO: 2.6 %
GLUCOSE SERPL-MCNC: 109 MG/DL
HCT VFR BLD CALC: 41 %
HGB BLD-MCNC: 12.5 G/DL
IMM GRANULOCYTES NFR BLD AUTO: 0.6 %
LYMPHOCYTES # BLD AUTO: 1.92 K/UL
LYMPHOCYTES NFR BLD AUTO: 22.9 %
MAN DIFF?: NORMAL
MCHC RBC-ENTMCNC: 26.2 PG
MCHC RBC-ENTMCNC: 30.5 GM/DL
MCV RBC AUTO: 86 FL
MONOCYTES # BLD AUTO: 0.74 K/UL
MONOCYTES NFR BLD AUTO: 8.8 %
NEUTROPHILS # BLD AUTO: 5.43 K/UL
NEUTROPHILS NFR BLD AUTO: 64.6 %
NT-PROBNP SERPL-MCNC: 99 PG/ML
PLATELET # BLD AUTO: 284 K/UL
POTASSIUM SERPL-SCNC: 4.1 MMOL/L
PROT SERPL-MCNC: 6.5 G/DL
RBC # BLD: 4.77 M/UL
RBC # FLD: 14.6 %
SODIUM SERPL-SCNC: 145 MMOL/L
WBC # FLD AUTO: 8.4 K/UL

## 2021-09-16 LAB — IGE SER-MCNC: 675 KU/L

## 2021-11-11 ENCOUNTER — NON-APPOINTMENT (OUTPATIENT)
Age: 71
End: 2021-11-11

## 2021-12-06 ENCOUNTER — APPOINTMENT (OUTPATIENT)
Dept: PULMONOLOGY | Facility: CLINIC | Age: 71
End: 2021-12-06

## 2022-08-04 ENCOUNTER — NON-APPOINTMENT (OUTPATIENT)
Age: 72
End: 2022-08-04

## 2022-11-30 ENCOUNTER — Encounter (OUTPATIENT)
Dept: URBAN - METROPOLITAN AREA CLINIC 27 | Facility: CLINIC | Age: 72
Setting detail: OPHTHALMOLOGY
End: 2022-11-30
Payer: MEDICARE

## 2023-01-27 ENCOUNTER — OUTPATIENT (OUTPATIENT)
Dept: OUTPATIENT SERVICES | Facility: HOSPITAL | Age: 73
LOS: 1 days | End: 2023-01-27
Payer: COMMERCIAL

## 2023-01-27 ENCOUNTER — APPOINTMENT (OUTPATIENT)
Dept: RADIOLOGY | Facility: HOSPITAL | Age: 73
End: 2023-01-27
Payer: COMMERCIAL

## 2023-01-27 DIAGNOSIS — Z00.8 ENCOUNTER FOR OTHER GENERAL EXAMINATION: ICD-10-CM

## 2023-01-27 DIAGNOSIS — Z98.890 OTHER SPECIFIED POSTPROCEDURAL STATES: Chronic | ICD-10-CM

## 2023-01-27 DIAGNOSIS — Z90.49 ACQUIRED ABSENCE OF OTHER SPECIFIED PARTS OF DIGESTIVE TRACT: Chronic | ICD-10-CM

## 2023-01-27 PROCEDURE — 73562 X-RAY EXAM OF KNEE 3: CPT

## 2023-01-27 PROCEDURE — 73562 X-RAY EXAM OF KNEE 3: CPT | Mod: 26,LT

## 2023-02-14 ENCOUNTER — APPOINTMENT (OUTPATIENT)
Dept: CT IMAGING | Facility: HOSPITAL | Age: 73
End: 2023-02-14
Payer: COMMERCIAL

## 2023-02-14 ENCOUNTER — OUTPATIENT (OUTPATIENT)
Dept: OUTPATIENT SERVICES | Facility: HOSPITAL | Age: 73
LOS: 1 days | End: 2023-02-14
Payer: COMMERCIAL

## 2023-02-14 DIAGNOSIS — Z90.49 ACQUIRED ABSENCE OF OTHER SPECIFIED PARTS OF DIGESTIVE TRACT: Chronic | ICD-10-CM

## 2023-02-14 DIAGNOSIS — M54.12 RADICULOPATHY, CERVICAL REGION: ICD-10-CM

## 2023-02-14 DIAGNOSIS — Z98.890 OTHER SPECIFIED POSTPROCEDURAL STATES: Chronic | ICD-10-CM

## 2023-02-14 PROCEDURE — 72125 CT NECK SPINE W/O DYE: CPT | Mod: 26,MH

## 2023-02-14 PROCEDURE — 72125 CT NECK SPINE W/O DYE: CPT

## 2023-09-05 ENCOUNTER — APPOINTMENT (OUTPATIENT)
Dept: MRI IMAGING | Facility: HOSPITAL | Age: 73
End: 2023-09-05
Payer: COMMERCIAL

## 2023-09-05 ENCOUNTER — OUTPATIENT (OUTPATIENT)
Dept: OUTPATIENT SERVICES | Facility: HOSPITAL | Age: 73
LOS: 1 days | End: 2023-09-05
Payer: COMMERCIAL

## 2023-09-05 DIAGNOSIS — Z98.890 OTHER SPECIFIED POSTPROCEDURAL STATES: Chronic | ICD-10-CM

## 2023-09-05 DIAGNOSIS — Z90.49 ACQUIRED ABSENCE OF OTHER SPECIFIED PARTS OF DIGESTIVE TRACT: Chronic | ICD-10-CM

## 2023-09-05 DIAGNOSIS — Z00.8 ENCOUNTER FOR OTHER GENERAL EXAMINATION: ICD-10-CM

## 2023-09-05 PROCEDURE — 73721 MRI JNT OF LWR EXTRE W/O DYE: CPT | Mod: 26,RT

## 2023-09-05 PROCEDURE — 73721 MRI JNT OF LWR EXTRE W/O DYE: CPT

## 2023-09-06 ENCOUNTER — OFFICE (OUTPATIENT)
Dept: URBAN - METROPOLITAN AREA CLINIC 27 | Facility: CLINIC | Age: 73
Setting detail: OPHTHALMOLOGY
End: 2023-09-06
Payer: COMMERCIAL

## 2023-09-06 DIAGNOSIS — H00.12: ICD-10-CM

## 2023-09-06 DIAGNOSIS — H16.223: ICD-10-CM

## 2023-09-06 PROCEDURE — 99213 OFFICE O/P EST LOW 20 MIN: CPT | Performed by: OPHTHALMOLOGY

## 2023-09-06 ASSESSMENT — AXIALLENGTH_DERIVED
OS_AL: 23.0708
OS_AL: 23.4014

## 2023-09-06 ASSESSMENT — SUPERFICIAL PUNCTATE KERATITIS (SPK)
OD_SPK: 2+
OS_SPK: 2+

## 2023-09-06 ASSESSMENT — REFRACTION_MANIFEST
OD_SPHERE: PLANO
OS_AXIS: 180
OS_VA1: 20/30-2
OD_AXIS: 160
OD_VA1: 20/50+2
OS_SPHERE: +1.00
OD_CYLINDER: +0.75
OS_CYLINDER: +0.50

## 2023-09-06 ASSESSMENT — REFRACTION_CURRENTRX
OD_OVR_VA: 20/
OS_SPHERE: +2.75
OS_AXIS: 022
OS_VPRISM_DIRECTION: SV
OS_OVR_VA: 20/
OS_CYLINDER: +0.25
OD_SPHERE: +2.75
OD_VPRISM_DIRECTION: SV

## 2023-09-06 ASSESSMENT — REFRACTION_AUTOREFRACTION
OD_AXIS: 017
OS_CYLINDER: +0.25
OS_AXIS: 179
OD_SPHERE: PLANO
OS_SPHERE: +0.25
OD_CYLINDER: +0.75

## 2023-09-06 ASSESSMENT — SPHEQUIV_DERIVED
OS_SPHEQUIV: 0.375
OS_SPHEQUIV: 1.25

## 2023-09-06 ASSESSMENT — KERATOMETRY
OD_K2POWER_DIOPTERS: 44.00
OD_AXISANGLE_DEGREES: 024
OS_K1POWER_DIOPTERS: 43.25
OD_K1POWER_DIOPTERS: 43.50
OS_K2POWER_DIOPTERS: 44.00
OS_AXISANGLE_DEGREES: 012
METHOD_AUTO_MANUAL: AUTO

## 2023-09-06 ASSESSMENT — VISUAL ACUITY
OS_BCVA: 20/25-2+1
OD_BCVA: 20/25-1+2

## 2023-09-06 ASSESSMENT — TONOMETRY: OD_IOP_MMHG: 12

## 2023-10-02 ENCOUNTER — OFFICE (OUTPATIENT)
Dept: URBAN - METROPOLITAN AREA CLINIC 27 | Facility: CLINIC | Age: 73
Setting detail: OPHTHALMOLOGY
End: 2023-10-02
Payer: COMMERCIAL

## 2023-10-02 DIAGNOSIS — H00.12: ICD-10-CM

## 2023-10-02 DIAGNOSIS — H43.812: ICD-10-CM

## 2023-10-02 DIAGNOSIS — H16.223: ICD-10-CM

## 2023-10-02 PROCEDURE — 92014 COMPRE OPH EXAM EST PT 1/>: CPT | Performed by: OPHTHALMOLOGY

## 2023-10-02 ASSESSMENT — REFRACTION_AUTOREFRACTION
OS_AXIS: 157
OS_SPHERE: 0.00
OS_CYLINDER: +0.25
OD_AXIS: 030
OD_SPHERE: +0.50
OD_CYLINDER: +0.75

## 2023-10-02 ASSESSMENT — SPHEQUIV_DERIVED
OS_SPHEQUIV: 1.25
OS_SPHEQUIV: 0.125
OD_SPHEQUIV: 0.875

## 2023-10-02 ASSESSMENT — REFRACTION_CURRENTRX
OD_SPHERE: +2.75
OS_VPRISM_DIRECTION: SV
OS_AXIS: 022
OD_VPRISM_DIRECTION: SV
OS_CYLINDER: +0.25
OS_OVR_VA: 20/
OD_OVR_VA: 20/
OS_SPHERE: +2.75

## 2023-10-02 ASSESSMENT — REFRACTION_MANIFEST
OS_VA1: 20/30-2
OS_SPHERE: +1.00
OS_CYLINDER: +0.50
OD_CYLINDER: +0.75
OD_VA1: 20/50+2
OS_AXIS: 180
OD_SPHERE: PLANO
OD_AXIS: 160

## 2023-10-02 ASSESSMENT — KERATOMETRY
OD_AXISANGLE_DEGREES: 039
OS_AXISANGLE_DEGREES: 022
OS_K1POWER_DIOPTERS: 43.25
OD_K1POWER_DIOPTERS: 43.75
OS_K2POWER_DIOPTERS: 44.00
METHOD_AUTO_MANUAL: AUTO
OD_K2POWER_DIOPTERS: 44.25

## 2023-10-02 ASSESSMENT — SUPERFICIAL PUNCTATE KERATITIS (SPK)
OS_SPK: 2+
OD_SPK: 2+

## 2023-10-02 ASSESSMENT — VISUAL ACUITY
OD_BCVA: 20/20-2
OS_BCVA: 20/30

## 2023-10-02 ASSESSMENT — TONOMETRY
OD_IOP_MMHG: 10
OS_IOP_MMHG: 18

## 2023-10-02 ASSESSMENT — CONFRONTATIONAL VISUAL FIELD TEST (CVF)
OD_FINDINGS: FULL
OS_FINDINGS: FULL

## 2023-10-02 ASSESSMENT — AXIALLENGTH_DERIVED
OD_AL: 23.0788
OS_AL: 23.0708
OS_AL: 23.4977

## 2024-01-09 ENCOUNTER — APPOINTMENT (OUTPATIENT)
Dept: MRI IMAGING | Facility: HOSPITAL | Age: 74
End: 2024-01-09
Payer: COMMERCIAL

## 2024-01-09 ENCOUNTER — OUTPATIENT (OUTPATIENT)
Dept: OUTPATIENT SERVICES | Facility: HOSPITAL | Age: 74
LOS: 1 days | End: 2024-01-09
Payer: MEDICARE

## 2024-01-09 DIAGNOSIS — Z98.890 OTHER SPECIFIED POSTPROCEDURAL STATES: Chronic | ICD-10-CM

## 2024-01-09 DIAGNOSIS — Z90.49 ACQUIRED ABSENCE OF OTHER SPECIFIED PARTS OF DIGESTIVE TRACT: Chronic | ICD-10-CM

## 2024-01-09 DIAGNOSIS — Z00.8 ENCOUNTER FOR OTHER GENERAL EXAMINATION: ICD-10-CM

## 2024-01-09 PROCEDURE — 73721 MRI JNT OF LWR EXTRE W/O DYE: CPT | Mod: 26,RT,MH

## 2024-01-09 PROCEDURE — 73721 MRI JNT OF LWR EXTRE W/O DYE: CPT

## 2024-02-04 ENCOUNTER — NON-APPOINTMENT (OUTPATIENT)
Age: 74
End: 2024-02-04

## 2024-02-13 ENCOUNTER — NON-APPOINTMENT (OUTPATIENT)
Age: 74
End: 2024-02-13

## 2024-03-25 ENCOUNTER — NON-APPOINTMENT (OUTPATIENT)
Age: 74
End: 2024-03-25

## 2024-06-09 ENCOUNTER — INPATIENT (INPATIENT)
Facility: HOSPITAL | Age: 74
LOS: 4 days | Discharge: HOME | DRG: 556 | End: 2024-06-14
Attending: HOSPITALIST | Admitting: STUDENT IN AN ORGANIZED HEALTH CARE EDUCATION/TRAINING PROGRAM
Payer: COMMERCIAL

## 2024-06-09 VITALS
RESPIRATION RATE: 18 BRPM | TEMPERATURE: 100 F | WEIGHT: 190.04 LBS | OXYGEN SATURATION: 94 % | SYSTOLIC BLOOD PRESSURE: 138 MMHG | HEART RATE: 65 BPM | DIASTOLIC BLOOD PRESSURE: 69 MMHG

## 2024-06-09 DIAGNOSIS — Z98.890 OTHER SPECIFIED POSTPROCEDURAL STATES: Chronic | ICD-10-CM

## 2024-06-09 DIAGNOSIS — M25.529 PAIN IN UNSPECIFIED ELBOW: ICD-10-CM

## 2024-06-09 DIAGNOSIS — Z90.49 ACQUIRED ABSENCE OF OTHER SPECIFIED PARTS OF DIGESTIVE TRACT: Chronic | ICD-10-CM

## 2024-06-09 LAB
ALBUMIN SERPL ELPH-MCNC: 3.9 G/DL — SIGNIFICANT CHANGE UP (ref 3.3–5)
ALP SERPL-CCNC: 112 U/L — SIGNIFICANT CHANGE UP (ref 40–120)
ALT FLD-CCNC: 44 U/L — SIGNIFICANT CHANGE UP (ref 10–45)
ANION GAP SERPL CALC-SCNC: 13 MMOL/L — SIGNIFICANT CHANGE UP (ref 5–17)
AST SERPL-CCNC: 49 U/L — HIGH (ref 10–40)
BASE EXCESS BLDV CALC-SCNC: 12.8 MMOL/L — HIGH (ref -2–3)
BASOPHILS # BLD AUTO: 0.02 K/UL — SIGNIFICANT CHANGE UP (ref 0–0.2)
BASOPHILS NFR BLD AUTO: 0.2 % — SIGNIFICANT CHANGE UP (ref 0–2)
BILIRUB SERPL-MCNC: 1.3 MG/DL — HIGH (ref 0.2–1.2)
BUN SERPL-MCNC: 12 MG/DL — SIGNIFICANT CHANGE UP (ref 7–23)
CA-I SERPL-SCNC: 1.13 MMOL/L — LOW (ref 1.15–1.33)
CALCIUM SERPL-MCNC: 9.4 MG/DL — SIGNIFICANT CHANGE UP (ref 8.4–10.5)
CHLORIDE BLDV-SCNC: 96 MMOL/L — SIGNIFICANT CHANGE UP (ref 96–108)
CHLORIDE SERPL-SCNC: 97 MMOL/L — SIGNIFICANT CHANGE UP (ref 96–108)
CO2 BLDV-SCNC: 41 MMOL/L — HIGH (ref 22–26)
CO2 SERPL-SCNC: 31 MMOL/L — SIGNIFICANT CHANGE UP (ref 22–31)
CREAT SERPL-MCNC: 0.95 MG/DL — SIGNIFICANT CHANGE UP (ref 0.5–1.3)
EGFR: 85 ML/MIN/1.73M2 — SIGNIFICANT CHANGE UP
EOSINOPHIL # BLD AUTO: 0.01 K/UL — SIGNIFICANT CHANGE UP (ref 0–0.5)
EOSINOPHIL NFR BLD AUTO: 0.1 % — SIGNIFICANT CHANGE UP (ref 0–6)
FLUAV AG NPH QL: SIGNIFICANT CHANGE UP
FLUBV AG NPH QL: SIGNIFICANT CHANGE UP
GAS PNL BLDV: 136 MMOL/L — SIGNIFICANT CHANGE UP (ref 136–145)
GAS PNL BLDV: SIGNIFICANT CHANGE UP
GAS PNL BLDV: SIGNIFICANT CHANGE UP
GLUCOSE BLDV-MCNC: 128 MG/DL — HIGH (ref 70–99)
GLUCOSE SERPL-MCNC: 130 MG/DL — HIGH (ref 70–99)
HCO3 BLDV-SCNC: 39 MMOL/L — HIGH (ref 22–29)
HCT VFR BLD CALC: 40.9 % — SIGNIFICANT CHANGE UP (ref 39–50)
HCT VFR BLDA CALC: 42 % — SIGNIFICANT CHANGE UP (ref 39–51)
HGB BLD CALC-MCNC: 14.1 G/DL — SIGNIFICANT CHANGE UP (ref 12.6–17.4)
HGB BLD-MCNC: 13.5 G/DL — SIGNIFICANT CHANGE UP (ref 13–17)
IMM GRANULOCYTES NFR BLD AUTO: 0.5 % — SIGNIFICANT CHANGE UP (ref 0–0.9)
LACTATE BLDV-MCNC: 1.7 MMOL/L — SIGNIFICANT CHANGE UP (ref 0.5–2)
LYMPHOCYTES # BLD AUTO: 1.96 K/UL — SIGNIFICANT CHANGE UP (ref 1–3.3)
LYMPHOCYTES # BLD AUTO: 15.1 % — SIGNIFICANT CHANGE UP (ref 13–44)
MCHC RBC-ENTMCNC: 30.1 PG — SIGNIFICANT CHANGE UP (ref 27–34)
MCHC RBC-ENTMCNC: 33 GM/DL — SIGNIFICANT CHANGE UP (ref 32–36)
MCV RBC AUTO: 91.1 FL — SIGNIFICANT CHANGE UP (ref 80–100)
MONOCYTES # BLD AUTO: 1.46 K/UL — HIGH (ref 0–0.9)
MONOCYTES NFR BLD AUTO: 11.3 % — SIGNIFICANT CHANGE UP (ref 2–14)
NEUTROPHILS # BLD AUTO: 9.45 K/UL — HIGH (ref 1.8–7.4)
NEUTROPHILS NFR BLD AUTO: 72.8 % — SIGNIFICANT CHANGE UP (ref 43–77)
NRBC # BLD: 0 /100 WBCS — SIGNIFICANT CHANGE UP (ref 0–0)
PCO2 BLDV: 55 MMHG — SIGNIFICANT CHANGE UP (ref 42–55)
PH BLDV: 7.46 — HIGH (ref 7.32–7.43)
PLATELET # BLD AUTO: 182 K/UL — SIGNIFICANT CHANGE UP (ref 150–400)
PO2 BLDV: 20 MMHG — LOW (ref 25–45)
POTASSIUM BLDV-SCNC: 2.3 MMOL/L — CRITICAL LOW (ref 3.5–5.1)
POTASSIUM SERPL-MCNC: 2.5 MMOL/L — CRITICAL LOW (ref 3.5–5.3)
POTASSIUM SERPL-SCNC: 2.5 MMOL/L — CRITICAL LOW (ref 3.5–5.3)
PROT SERPL-MCNC: 7.2 G/DL — SIGNIFICANT CHANGE UP (ref 6–8.3)
RBC # BLD: 4.49 M/UL — SIGNIFICANT CHANGE UP (ref 4.2–5.8)
RBC # FLD: 13.3 % — SIGNIFICANT CHANGE UP (ref 10.3–14.5)
RSV RNA NPH QL NAA+NON-PROBE: SIGNIFICANT CHANGE UP
SAO2 % BLDV: 25 % — LOW (ref 67–88)
SARS-COV-2 RNA SPEC QL NAA+PROBE: SIGNIFICANT CHANGE UP
SODIUM SERPL-SCNC: 141 MMOL/L — SIGNIFICANT CHANGE UP (ref 135–145)
WBC # BLD: 12.96 K/UL — HIGH (ref 3.8–10.5)
WBC # FLD AUTO: 12.96 K/UL — HIGH (ref 3.8–10.5)

## 2024-06-09 PROCEDURE — 20605 DRAIN/INJ JOINT/BURSA W/O US: CPT | Mod: RT

## 2024-06-09 PROCEDURE — 99285 EMERGENCY DEPT VISIT HI MDM: CPT | Mod: 25

## 2024-06-09 PROCEDURE — 99223 1ST HOSP IP/OBS HIGH 75: CPT

## 2024-06-09 PROCEDURE — 73070 X-RAY EXAM OF ELBOW: CPT | Mod: 26,RT

## 2024-06-09 RX ORDER — MORPHINE SULFATE 50 MG/1
2 CAPSULE, EXTENDED RELEASE ORAL ONCE
Refills: 0 | Status: DISCONTINUED | OUTPATIENT
Start: 2024-06-09 | End: 2024-06-09

## 2024-06-09 RX ORDER — ACETAMINOPHEN 500 MG
1000 TABLET ORAL ONCE
Refills: 0 | Status: COMPLETED | OUTPATIENT
Start: 2024-06-09 | End: 2024-06-09

## 2024-06-09 RX ORDER — POTASSIUM CHLORIDE 20 MEQ
40 PACKET (EA) ORAL ONCE
Refills: 0 | Status: COMPLETED | OUTPATIENT
Start: 2024-06-09 | End: 2024-06-09

## 2024-06-09 RX ORDER — POTASSIUM CHLORIDE 20 MEQ
10 PACKET (EA) ORAL
Refills: 0 | Status: COMPLETED | OUTPATIENT
Start: 2024-06-09 | End: 2024-06-09

## 2024-06-09 RX ORDER — ACETAMINOPHEN 500 MG
1000 TABLET ORAL ONCE
Refills: 0 | Status: DISCONTINUED | OUTPATIENT
Start: 2024-06-09 | End: 2024-06-09

## 2024-06-09 RX ORDER — MORPHINE SULFATE 50 MG/1
4 CAPSULE, EXTENDED RELEASE ORAL ONCE
Refills: 0 | Status: DISCONTINUED | OUTPATIENT
Start: 2024-06-09 | End: 2024-06-09

## 2024-06-09 RX ADMIN — Medication 100 MILLIEQUIVALENT(S): at 18:28

## 2024-06-09 RX ADMIN — Medication 100 MILLIEQUIVALENT(S): at 20:56

## 2024-06-09 RX ADMIN — MORPHINE SULFATE 4 MILLIGRAM(S): 50 CAPSULE, EXTENDED RELEASE ORAL at 18:16

## 2024-06-09 RX ADMIN — Medication 1000 MILLIGRAM(S): at 21:14

## 2024-06-09 RX ADMIN — Medication 40 MILLIEQUIVALENT(S): at 18:17

## 2024-06-09 RX ADMIN — MORPHINE SULFATE 4 MILLIGRAM(S): 50 CAPSULE, EXTENDED RELEASE ORAL at 21:14

## 2024-06-09 RX ADMIN — Medication 400 MILLIGRAM(S): at 17:04

## 2024-06-09 RX ADMIN — Medication 100 MILLIEQUIVALENT(S): at 19:33

## 2024-06-09 NOTE — ED PROVIDER NOTE - ATTENDING APP SHARED VISIT CONTRIBUTION OF CARE
Attending Alisa Aguilera MD- This was a shared visit with EVERETT.  I have reviewed and discussed the case with the EVERETT and agree with verified documentation unless otherwise documented.  I have independently spoken with and examined the patient and my documentation of history/physical exam and MDM are as follows:    73 M with PMH gout, GLENDY, bilateral knee replacement, HTN presenting for evaluation of 3 to 4 days of atraumatic right elbow pain, redness, swelling.  Noted to be febrile in ED triage.  States pain is similar to previous gout flares, which have previously only reported lower extremities.  Minimal relief with home tramadol.  No chest pain, shortness of breath, cough, abdominal pain, GI symptoms, dysuria.  On exam, patient in no acute distress, normal work of breathing, speaking full sentences.  Right elbow with edema, exquisite tenderness to palpation, warmth, redness overlying posterior aspect; severely limited passive and active range of motion due to pain.  Proximal and distal joints nontender with no overlying skin change, distal pulses, sensation, strength, neuro motor function intact.    MDM–triage vitals notable for oral temp 100.1, otherwise hemodynamically stable.  Symptoms most suspicious of gout flare, however given fever and no prior incidents involving upper extremities, also concern for possible septic joint.  Will obtain labs and x-ray to evaluate.

## 2024-06-09 NOTE — H&P ADULT - PROBLEM SELECTOR PLAN 1
- Hx of gout w/ attacks in LE  - WBAT  - OT  - Toradol 15mg q6hrs for 1 day and can consider transition to oral NSAIDs afterwards   - Start Protonix 40mg qd  - f/u ESR/CRP  - f/u MR to r/o OM  - Consider Ancef for cellulitis if no improvement- Pt not meeting sepsis criteria- Will hold for now

## 2024-06-09 NOTE — CONSULT NOTE ADULT - SUBJECTIVE AND OBJECTIVE BOX
HPI  73yMale w/ pmh of hypertension and gout c/o R elbow pain for 1 week. Denies fevers/chills. Denies numbness/tingling in the RUE. Denies any recent trauma/injuries/sugery/injections. Denies history of IV drug use, new sexual encounters/STIs, or other infections. Patient's daughter reports that he has recently stopped taking his gout medications.     ROS  Negative unless otherwise specified in HPI.    PAST MEDICAL & SURGICAL Hx  PAST MEDICAL & SURGICAL HISTORY:  Hypertension      GERD (gastroesophageal reflux disease)      Insomnia      Fall  with several injuries      TIA (transient ischemic attack)  2008      Aneurysm  of the Aortic Root      Dizziness      Gout      Measles      Mumps      Nutcracker esophagus      Back pain      SVT (supraventricular tachycardia)      TIA (transient ischemic attack)      OA (osteoarthritis)      Nutcracker esophagus      Vertigo      Tinnitus      S/P knee replacement  bilateral-2006      S/P lumbar and lumbosacral fusion by anterior technique  2004      S/P spinal fusion  C 5,6,7-2009      S/P spinal fusion  2009-L2-L3 L3-L4      H/O cardiac catheterization      History of appendectomy          MEDICATIONS  Home Medications:  Ambien CR 12.5 mg oral tablet, extended release: 1 tab(s) orally once a day (at bedtime) (14 Nov 2017 10:26)  aspirin 325 mg oral tablet: 1 tab(s) orally once a day (14 Nov 2017 10:26)  Dexilant 60 mg oral delayed release capsule: 1 cap(s) orally once a day (14 Nov 2017 10:26)  Diovan  mg-12.5 mg oral tablet: 1 tab(s) orally once a day (14 Nov 2017 10:26)  Lyrica 100 mg oral capsule: 1 cap(s) orally 3 times a day (14 Nov 2017 10:26)  Soma 350 mg oral tablet: 1 tab(s) orally 2 times a day, As Needed (14 Nov 2017 10:26)  Toprol-XL 25 mg oral tablet, extended release: 1 tab(s) orally 2 times a day (14 Nov 2017 10:26)  traMADol 50 mg oral tablet: 2 tab(s) orally every 6 hours (14 Nov 2017 10:26)      ALLERGIES  sulfa drugs (Rash)  penicillin (Unknown)      FAMILY Hx  FAMILY HISTORY:  CAD (coronary artery disease) (Sibling)        SOCIAL Hx  Social History:      VITALS  Vital Signs Last 24 Hrs  T(C): 36.7 (09 Jun 2024 19:22), Max: 37.8 (09 Jun 2024 15:01)  T(F): 98.1 (09 Jun 2024 19:22), Max: 100.1 (09 Jun 2024 15:01)  HR: 62 (09 Jun 2024 19:22) (60 - 65)  BP: 130/62 (09 Jun 2024 19:22) (130/61 - 138/69)  BP(mean): --  RR: 20 (09 Jun 2024 19:38) (16 - 20)  SpO2: 97% (09 Jun 2024 19:38) (94% - 97%)    Parameters below as of 09 Jun 2024 19:38  Patient On (Oxygen Delivery Method): nasal cannula  O2 Flow (L/min): 4      PHYSICAL EXAM  Gen: Lying in bed, non-toxic appearing, NAD  Resp: No increased WOB  RUE:  Skin intact, +effusion and +erythema over R elbow  +TTP over R elbow, warmer to touch relative to L side; compartments soft  R elbow passive ROM 20-70 deg, limited 2/2 pain  Motor: Musc/Med/Rad/AIN/PIN/U intact  Sensory: Musc/Med/Rad/U SILT  +Rad pulse, WWP    LABS                        13.5   12.96 )-----------( 182      ( 09 Jun 2024 17:07 )             40.9     06-09    141  |  97  |  12  ----------------------------<  130<H>  2.5<LL>   |  31  |  0.95    Ca    9.4      09 Jun 2024 17:07    TPro  7.2  /  Alb  3.9  /  TBili  1.3<H>  /  DBili  x   /  AST  49<H>  /  ALT  44  /  AlkPhos  112  06-09            IMAGING  XRs: R elbow effusion present, but no acute fx or dislocation (personal read)        ASSESSMENT & PLAN  73yMale w/ R elbow pain s/p 3 dry arthrocentesis performed by ED. ESR pending CRP 91. Low suspicion for septic arthritis.   Ddx includes olecranon bursitis vs. cellulitis vs. gout vs. pseudogout vs. other inflammatory arthropathy with gout being the most likely given previous history and the fact that patient has self discontinued taking allopurinol.  -WBAT RUE, ACE wrap PRN  -f/u ESR and CRP  -no acute ortho surgery at this time  -pain control  -ice/cold compress, elevation  -Recommend Rheum consult  -MRI for check for effusion and/or to r/o osteo

## 2024-06-09 NOTE — H&P ADULT - NSHPREVIEWOFSYSTEMS_GEN_ALL_CORE
CONSTITUTIONAL: No fever, weight loss  EYES: No eye pain, visual disturbances, or discharge  ENMT: No difficulty hearing, tinnitus, vertigo; No sinus or throat pain  RESPIRATORY: No SOB. No cough, wheezing, chills or hemoptysis  CARDIOVASCULAR: No chest pain, palpitations, dizziness, or leg swelling  GASTROINTESTINAL: No abdominal or epigastric pain. No nausea, vomiting, or hematemesis; No diarrhea or constipation. No melena or hematochezia.  GENITOURINARY: No dysuria, frequency, hematuria, or incontinence  NEUROLOGICAL: No headaches, memory loss, loss of strength, numbness, or tremors  SKIN: No itching, burning, rashes, or lesions   LYMPH NODES: No enlarged glands  ENDOCRINE: No heat or cold intolerance; No hair loss  MUSCULOSKELETAL: right elbow pain, edema, erythema.   PSYCHIATRIC: No depression, anxiety, mood swings, or difficulty sleeping  HEME/LYMPH: No easy bruising, or bleeding gums

## 2024-06-09 NOTE — H&P ADULT - REASON FOR ADMISSION
Pt. 12wks pregnant, states she went to her OB last week and was told there was no heartbeat. c/o stabbing abdominal, back and leg pain. Denies vaginal bleeding.
Gout Flare

## 2024-06-09 NOTE — H&P ADULT - NSICDXPASTMEDICALHX_GEN_ALL_CORE_FT
PAST MEDICAL HISTORY:  Aneurysm of the Aortic Root    Back pain     Dizziness     Fall with several injuries    GERD (gastroesophageal reflux disease)     Gout     Hypertension     Insomnia     Measles     Mumps     Nutcracker esophagus     Nutcracker esophagus     OA (osteoarthritis)     SVT (supraventricular tachycardia)     TIA (transient ischemic attack) 2008    TIA (transient ischemic attack)     Tinnitus     Vertigo

## 2024-06-09 NOTE — H&P ADULT - NSHPPHYSICALEXAM_GEN_ALL_CORE
Vital Signs Last 24 Hrs  T(C): 37.1 (10 Edmund 2024 00:09), Max: 37.8 (09 Jun 2024 15:01)  T(F): 98.7 (10 Edmund 2024 00:09), Max: 100.1 (09 Jun 2024 15:01)  HR: 70 (10 Edmund 2024 00:09) (60 - 70)  BP: 138/72 (10 Edmund 2024 00:09) (130/61 - 138/72)  BP(mean): 91 (10 Edmund 2024 00:09) (91 - 91)  RR: 17 (10 Edmund 2024 00:09) (16 - 20)  SpO2: 95% (10 Edmund 2024 00:09) (94% - 97%)    Parameters below as of 10 Edmund 2024 00:09  Patient On (Oxygen Delivery Method): nasal cannula O2 Flow (L/min): 4    CONSTITUTIONAL: Well-groomed, in no apparent distress  EYES: No conjunctival or scleral injection, non-icteric;   ENMT: No external nasal lesions; MMM  NECK: Trachea midline without palpable neck mass; thyroid not enlarged and non-tender  RESPIRATORY: Breathing comfortably; no dullness to percussion; lungs CTA without wheeze/rhonchi/rales  CARDIOVASCULAR: +S1S2, RRR, no M/G/R; pedal pulses full and symmetric; no lower extremity edema  GASTROINTESTINAL: No palpable masses or tenderness, +BS throughout, no rebound/guarding; no hepatosplenomegaly; no hernia palpated  LYMPHATIC: No cervical LAD or tenderness  SKIN: No rashes or ulcers noted  MSK: Right elbow w/ effusion and overlying erythema over R elbow w/ TTP, warmer to touch relative to L side. R elbow passive ROM 20-70 deg, limited 2/2 pain  NEUROLOGIC: CN II-XII intact; sensation intact in LEs b/l to light touch  PSYCHIATRIC: A&Ox3; mood and affect appropriate; appropriate insight and judgment

## 2024-06-09 NOTE — H&P ADULT - NSICDXPASTSURGICALHX_GEN_ALL_CORE_FT
PAST SURGICAL HISTORY:  H/O cardiac catheterization     History of appendectomy     S/P knee replacement bilateral-2006    S/P lumbar and lumbosacral fusion by anterior technique 2004    S/P spinal fusion C 5,6,7-2009    S/P spinal fusion 2009-L2-L3 L3-L4

## 2024-06-09 NOTE — H&P ADULT - NSHPLABSRESULTS_GEN_ALL_CORE
LABS:                      13.5   12.96 )-----------( 182      ( 09 Jun 2024 17:07 )             40.9     06-09    141  |  97  |  12  ----------------------------<  130<H>  2.5<LL>   |  31  |  0.95    Ca    9.4      09 Jun 2024 17:07    TPro  7.2  /  Alb  3.9  /  TBili  1.3<H>  /  DBili  x   /  AST  49<H>  /  ALT  44  /  AlkPhos  112  06-09    LIVER FUNCTIONS - ( 09 Jun 2024 17:07 )  Alb: 3.9 g/dL / Pro: 7.2 g/dL / ALK PHOS: 112 U/L / ALT: 44 U/L / AST: 49 U/L / GGT: x           Urinalysis Basic - ( 09 Jun 2024 17:07 )  Color: x / Appearance: x / SG: x / pH: x  Gluc: 130 mg/dL / Ketone: x  / Bili: x / Urobili: x   Blood: x / Protein: x / Nitrite: x   Leuk Esterase: x / RBC: x / WBC x   Sq Epi: x / Non Sq Epi: x / Bacteria: x    IMAGING:  Xray Elbow AP + Lateral, Right (06.09.24 @ 17:06)  IMPRESSION:   - Limited by lack of a true lateral view  - No acute displaced fracture or dislocation.  - Soft tissue swelling and small to moderate elbow joint effusion, nonspecific

## 2024-06-09 NOTE — H&P ADULT - ASSESSMENT
73M HTN, AAA, SVT s/p ablation, gout, AAA, nutcracker esophagus pw elbow pain likely i/s/o gout flare though w/ no aspirate, unable to r/o infectious etiology.

## 2024-06-09 NOTE — ED ADULT NURSE REASSESSMENT NOTE - NS ED NURSE REASSESS COMMENT FT1
Report received from JACKY Bolden. Pt received A&Ox4, vitals retaken and documented. Pt received on CM and 4L NC, resting comfortably in stretcher, denying any pain or discomfort. Bed locked and in lowest position, side rails raised, call bell within reach. Currently pending potassium riders.

## 2024-06-09 NOTE — H&P ADULT - HISTORY OF PRESENT ILLNESS
Pt is 73M HTN, AAA, SVT s/p ablation, gout, AAA, nutcracker esophagus pw elbow pain.    Reports worsening right elbow pain, edema, and erythema over the past week. 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 No F/C, injections. Has not taken allopurinol in about 2 years though prior flares have been in feet.     Of note does report diarrhea w/ increased watery stools. No recent abx use.     Does maintain a high meat diet w/ consumption of sugary drinks daily. Little EtOH use.    In the ED T 100.1, on 2L NC w/ WBC 12.96 and K 2.5 w/ CRP 91 and xray w/ soft tissue edema and small to mod effusion. Attempt to tap x3 by ED w/ all being dry taps. He was administered Morphine and KCl.    35M hx gerd presents to the ED for black stool since yesterday. 4 episodes with soft semi formed stools. no dizziness cp sob abd pain. states he takes motrin 2x per week for years. non-drinkers. no pain at this time.

## 2024-06-09 NOTE — ED PROCEDURE NOTE - PROCEDURE ADDITIONAL DETAILS
Emergency Department Focused Ultrasound performed at patient's bedside for educational purposes. An appropriate follow up study is ordered. -Alisa Aguilera MD
Dry tap, orthopedic consulted and aware.

## 2024-06-09 NOTE — ED PROVIDER NOTE - PROGRESS NOTE DETAILS
Rell connell PA-C: wbc 12.9 and noted to have oral temp of 100.1F on arrival. concern for possible septic joint. joint to be tapped. spoke with ortho. recommend holding off on abx after tap incase patient requires OR as culture done in OR. potassium noted to be 2.5. will replete. discussed with Dr. Aguilera. Kalen ANDERSON, PGY-1;  Received sign out on this patient, concern for septic elbow, ortho evaluated bedside. Will f/u reccomendations. Kalen ANDERSON, PGY-1;  [facility], will start IV antibiotics, patient endorsed to hospitalist, will admit to their service.

## 2024-06-09 NOTE — H&P ADULT - NSICDXFAMILYHX_GEN_ALL_CORE_FT
FAMILY HISTORY:  Sibling  Still living? Unknown  CAD (coronary artery disease), Age at diagnosis: Age Unknown

## 2024-06-09 NOTE — ED PROVIDER NOTE - CADM POA URETHRAL CATHETER
Impression: Presence of intraocular lens  Z96.1 - 4 week p/o pseudo OU. Stable. Intermittent temp flicker is likely reflection of IOL-should dissipate. Plan: Observe. New glasses rx given to patient to fill PRN.  Can use OTC readers PRN
No

## 2024-06-09 NOTE — ED PROVIDER NOTE - WET READ LAUNCH FT
There is 1 Wet Read(s) to document. There are no Wet Read(s) to document. [employer] Pregnancy And Lactation Text: This medication is Pregnancy Category B and is considered safe. There is some data to suggest avoiding during the third trimester. It is unknown if this medication is excreted in breast milk.

## 2024-06-10 DIAGNOSIS — Z79.899 OTHER LONG TERM (CURRENT) DRUG THERAPY: ICD-10-CM

## 2024-06-10 DIAGNOSIS — M10.9 GOUT, UNSPECIFIED: ICD-10-CM

## 2024-06-10 DIAGNOSIS — R19.7 DIARRHEA, UNSPECIFIED: ICD-10-CM

## 2024-06-10 DIAGNOSIS — Z29.9 ENCOUNTER FOR PROPHYLACTIC MEASURES, UNSPECIFIED: ICD-10-CM

## 2024-06-10 LAB
ADD ON TEST-SPECIMEN IN LAB: SIGNIFICANT CHANGE UP
ANION GAP SERPL CALC-SCNC: 12 MMOL/L — SIGNIFICANT CHANGE UP (ref 5–17)
ANION GAP SERPL CALC-SCNC: 14 MMOL/L — SIGNIFICANT CHANGE UP (ref 5–17)
BASOPHILS # BLD AUTO: 0.04 K/UL — SIGNIFICANT CHANGE UP (ref 0–0.2)
BASOPHILS NFR BLD AUTO: 0.4 % — SIGNIFICANT CHANGE UP (ref 0–2)
BUN SERPL-MCNC: 13 MG/DL — SIGNIFICANT CHANGE UP (ref 7–23)
BUN SERPL-MCNC: 13 MG/DL — SIGNIFICANT CHANGE UP (ref 7–23)
CALCIUM SERPL-MCNC: 8.7 MG/DL — SIGNIFICANT CHANGE UP (ref 8.4–10.5)
CALCIUM SERPL-MCNC: 8.9 MG/DL — SIGNIFICANT CHANGE UP (ref 8.4–10.5)
CHLORIDE SERPL-SCNC: 102 MMOL/L — SIGNIFICANT CHANGE UP (ref 96–108)
CHLORIDE SERPL-SCNC: 99 MMOL/L — SIGNIFICANT CHANGE UP (ref 96–108)
CO2 SERPL-SCNC: 27 MMOL/L — SIGNIFICANT CHANGE UP (ref 22–31)
CO2 SERPL-SCNC: 29 MMOL/L — SIGNIFICANT CHANGE UP (ref 22–31)
CREAT SERPL-MCNC: 0.83 MG/DL — SIGNIFICANT CHANGE UP (ref 0.5–1.3)
CREAT SERPL-MCNC: 0.84 MG/DL — SIGNIFICANT CHANGE UP (ref 0.5–1.3)
EGFR: 92 ML/MIN/1.73M2 — SIGNIFICANT CHANGE UP
EGFR: 92 ML/MIN/1.73M2 — SIGNIFICANT CHANGE UP
EOSINOPHIL # BLD AUTO: 0.07 K/UL — SIGNIFICANT CHANGE UP (ref 0–0.5)
EOSINOPHIL NFR BLD AUTO: 0.7 % — SIGNIFICANT CHANGE UP (ref 0–6)
ERYTHROCYTE [SEDIMENTATION RATE] IN BLOOD: 41 MM/HR — HIGH (ref 0–20)
GLUCOSE SERPL-MCNC: 133 MG/DL — HIGH (ref 70–99)
GLUCOSE SERPL-MCNC: 140 MG/DL — HIGH (ref 70–99)
HCT VFR BLD CALC: 37.5 % — LOW (ref 39–50)
HGB BLD-MCNC: 12.2 G/DL — LOW (ref 13–17)
IMM GRANULOCYTES NFR BLD AUTO: 0.5 % — SIGNIFICANT CHANGE UP (ref 0–0.9)
LYMPHOCYTES # BLD AUTO: 1.52 K/UL — SIGNIFICANT CHANGE UP (ref 1–3.3)
LYMPHOCYTES # BLD AUTO: 14.7 % — SIGNIFICANT CHANGE UP (ref 13–44)
MAGNESIUM SERPL-MCNC: 1.8 MG/DL — SIGNIFICANT CHANGE UP (ref 1.6–2.6)
MCHC RBC-ENTMCNC: 30.1 PG — SIGNIFICANT CHANGE UP (ref 27–34)
MCHC RBC-ENTMCNC: 32.5 GM/DL — SIGNIFICANT CHANGE UP (ref 32–36)
MCV RBC AUTO: 92.6 FL — SIGNIFICANT CHANGE UP (ref 80–100)
MONOCYTES # BLD AUTO: 0.89 K/UL — SIGNIFICANT CHANGE UP (ref 0–0.9)
MONOCYTES NFR BLD AUTO: 8.6 % — SIGNIFICANT CHANGE UP (ref 2–14)
NEUTROPHILS # BLD AUTO: 7.74 K/UL — HIGH (ref 1.8–7.4)
NEUTROPHILS NFR BLD AUTO: 75.1 % — SIGNIFICANT CHANGE UP (ref 43–77)
NRBC # BLD: 0 /100 WBCS — SIGNIFICANT CHANGE UP (ref 0–0)
PHOSPHATE SERPL-MCNC: 1.5 MG/DL — LOW (ref 2.5–4.5)
PLATELET # BLD AUTO: 151 K/UL — SIGNIFICANT CHANGE UP (ref 150–400)
POTASSIUM SERPL-MCNC: 2.6 MMOL/L — CRITICAL LOW (ref 3.5–5.3)
POTASSIUM SERPL-MCNC: 3.3 MMOL/L — LOW (ref 3.5–5.3)
POTASSIUM SERPL-SCNC: 2.6 MMOL/L — CRITICAL LOW (ref 3.5–5.3)
POTASSIUM SERPL-SCNC: 3.3 MMOL/L — LOW (ref 3.5–5.3)
PROCALCITONIN SERPL-MCNC: 0.12 NG/ML — HIGH (ref 0.02–0.1)
RBC # BLD: 4.05 M/UL — LOW (ref 4.2–5.8)
RBC # FLD: 13.2 % — SIGNIFICANT CHANGE UP (ref 10.3–14.5)
SODIUM SERPL-SCNC: 140 MMOL/L — SIGNIFICANT CHANGE UP (ref 135–145)
SODIUM SERPL-SCNC: 143 MMOL/L — SIGNIFICANT CHANGE UP (ref 135–145)
WBC # BLD: 10.31 K/UL — SIGNIFICANT CHANGE UP (ref 3.8–10.5)
WBC # FLD AUTO: 10.31 K/UL — SIGNIFICANT CHANGE UP (ref 3.8–10.5)

## 2024-06-10 PROCEDURE — 99233 SBSQ HOSP IP/OBS HIGH 50: CPT

## 2024-06-10 PROCEDURE — 73221 MRI JOINT UPR EXTREM W/O DYE: CPT | Mod: 26,RT

## 2024-06-10 PROCEDURE — 99223 1ST HOSP IP/OBS HIGH 75: CPT

## 2024-06-10 RX ORDER — LORATADINE 10 MG/1
10 TABLET ORAL DAILY
Refills: 0 | Status: DISCONTINUED | OUTPATIENT
Start: 2024-06-10 | End: 2024-06-14

## 2024-06-10 RX ORDER — ASPIRIN/CALCIUM CARB/MAGNESIUM 324 MG
1 TABLET ORAL
Qty: 0 | Refills: 0 | DISCHARGE

## 2024-06-10 RX ORDER — METOPROLOL TARTRATE 50 MG
1 TABLET ORAL
Qty: 0 | Refills: 0 | DISCHARGE

## 2024-06-10 RX ORDER — CHOLECALCIFEROL (VITAMIN D3) 125 MCG
1 CAPSULE ORAL
Refills: 0 | DISCHARGE

## 2024-06-10 RX ORDER — PREGABALIN 225 MG/1
1 CAPSULE ORAL
Refills: 0 | DISCHARGE

## 2024-06-10 RX ORDER — ATORVASTATIN CALCIUM 80 MG/1
20 TABLET, FILM COATED ORAL AT BEDTIME
Refills: 0 | Status: DISCONTINUED | OUTPATIENT
Start: 2024-06-10 | End: 2024-06-14

## 2024-06-10 RX ORDER — DEXLANSOPRAZOLE 30 MG/1
1 CAPSULE, DELAYED RELEASE ORAL
Qty: 0 | Refills: 0 | DISCHARGE

## 2024-06-10 RX ORDER — POTASSIUM CHLORIDE 20 MEQ
40 PACKET (EA) ORAL EVERY 4 HOURS
Refills: 0 | Status: COMPLETED | OUTPATIENT
Start: 2024-06-10 | End: 2024-06-10

## 2024-06-10 RX ORDER — ACETAMINOPHEN 500 MG
650 TABLET ORAL EVERY 6 HOURS
Refills: 0 | Status: DISCONTINUED | OUTPATIENT
Start: 2024-06-10 | End: 2024-06-14

## 2024-06-10 RX ORDER — ZOLPIDEM TARTRATE 10 MG/1
1 TABLET ORAL
Qty: 0 | Refills: 0 | DISCHARGE

## 2024-06-10 RX ORDER — TRAMADOL HYDROCHLORIDE 50 MG/1
50 TABLET ORAL EVERY 6 HOURS
Refills: 0 | Status: DISCONTINUED | OUTPATIENT
Start: 2024-06-10 | End: 2024-06-14

## 2024-06-10 RX ORDER — CARISOPRODOL 250 MG
1 TABLET ORAL
Qty: 0 | Refills: 0 | DISCHARGE

## 2024-06-10 RX ORDER — MAGNESIUM OXIDE 400 MG ORAL TABLET 241.3 MG
400 TABLET ORAL
Refills: 0 | Status: COMPLETED | OUTPATIENT
Start: 2024-06-10 | End: 2024-06-11

## 2024-06-10 RX ORDER — ZOLPIDEM TARTRATE 10 MG/1
1 TABLET ORAL
Refills: 0 | DISCHARGE

## 2024-06-10 RX ORDER — SENNA PLUS 8.6 MG/1
2 TABLET ORAL AT BEDTIME
Refills: 0 | Status: DISCONTINUED | OUTPATIENT
Start: 2024-06-10 | End: 2024-06-14

## 2024-06-10 RX ORDER — TRAMADOL HYDROCHLORIDE 50 MG/1
2 TABLET ORAL
Qty: 0 | Refills: 0 | DISCHARGE

## 2024-06-10 RX ORDER — SODIUM CHLORIDE 0.65 %
1 AEROSOL, SPRAY (ML) NASAL
Refills: 0 | DISCHARGE

## 2024-06-10 RX ORDER — ZOLPIDEM TARTRATE 10 MG/1
5 TABLET ORAL AT BEDTIME
Refills: 0 | Status: DISCONTINUED | OUTPATIENT
Start: 2024-06-10 | End: 2024-06-14

## 2024-06-10 RX ORDER — TRAMADOL HYDROCHLORIDE 50 MG/1
1 TABLET ORAL
Refills: 0 | DISCHARGE

## 2024-06-10 RX ORDER — POTASSIUM CHLORIDE 20 MEQ
10 PACKET (EA) ORAL
Refills: 0 | Status: COMPLETED | OUTPATIENT
Start: 2024-06-10 | End: 2024-06-10

## 2024-06-10 RX ORDER — ALLOPURINOL 300 MG
300 TABLET ORAL DAILY
Refills: 0 | Status: DISCONTINUED | OUTPATIENT
Start: 2024-06-10 | End: 2024-06-14

## 2024-06-10 RX ORDER — OMEGA-3 ACID ETHYL ESTERS 1 G
4 CAPSULE ORAL DAILY
Refills: 0 | Status: DISCONTINUED | OUTPATIENT
Start: 2024-06-10 | End: 2024-06-14

## 2024-06-10 RX ORDER — CETIRIZINE HYDROCHLORIDE 10 MG/1
1 TABLET ORAL
Refills: 0 | DISCHARGE

## 2024-06-10 RX ORDER — METHOCARBAMOL 500 MG/1
500 TABLET, FILM COATED ORAL ONCE
Refills: 0 | Status: COMPLETED | OUTPATIENT
Start: 2024-06-10 | End: 2024-06-12

## 2024-06-10 RX ORDER — AMLODIPINE BESYLATE 2.5 MG/1
1 TABLET ORAL
Refills: 0 | DISCHARGE

## 2024-06-10 RX ORDER — KETOROLAC TROMETHAMINE 30 MG/ML
15 SYRINGE (ML) INJECTION EVERY 6 HOURS
Refills: 0 | Status: DISCONTINUED | OUTPATIENT
Start: 2024-06-10 | End: 2024-06-11

## 2024-06-10 RX ORDER — COLCHICINE 0.6 MG
0.6 TABLET ORAL DAILY
Refills: 0 | Status: DISCONTINUED | OUTPATIENT
Start: 2024-06-10 | End: 2024-06-14

## 2024-06-10 RX ORDER — SODIUM CHLORIDE 0.65 %
1 AEROSOL, SPRAY (ML) NASAL DAILY
Refills: 0 | Status: DISCONTINUED | OUTPATIENT
Start: 2024-06-10 | End: 2024-06-14

## 2024-06-10 RX ORDER — ALLOPURINOL 300 MG
100 TABLET ORAL DAILY
Refills: 0 | Status: DISCONTINUED | OUTPATIENT
Start: 2024-06-10 | End: 2024-06-10

## 2024-06-10 RX ORDER — FLUTICASONE PROPIONATE 220 MCG
1 AEROSOL WITH ADAPTER (GRAM) INHALATION
Refills: 0 | DISCHARGE

## 2024-06-10 RX ORDER — PANTOPRAZOLE SODIUM 20 MG/1
40 TABLET, DELAYED RELEASE ORAL
Refills: 0 | Status: DISCONTINUED | OUTPATIENT
Start: 2024-06-10 | End: 2024-06-14

## 2024-06-10 RX ORDER — DIPHENOXYLATE HCL/ATROPINE 2.5-.025MG
1 TABLET ORAL
Refills: 0 | DISCHARGE

## 2024-06-10 RX ORDER — AMLODIPINE BESYLATE 2.5 MG/1
5 TABLET ORAL DAILY
Refills: 0 | Status: DISCONTINUED | OUTPATIENT
Start: 2024-06-10 | End: 2024-06-14

## 2024-06-10 RX ORDER — SODIUM,POTASSIUM PHOSPHATES 278-250MG
1 POWDER IN PACKET (EA) ORAL ONCE
Refills: 0 | Status: DISCONTINUED | OUTPATIENT
Start: 2024-06-10 | End: 2024-06-11

## 2024-06-10 RX ORDER — ATORVASTATIN CALCIUM 80 MG/1
1 TABLET, FILM COATED ORAL
Refills: 0 | DISCHARGE

## 2024-06-10 RX ORDER — ENOXAPARIN SODIUM 100 MG/ML
40 INJECTION SUBCUTANEOUS EVERY 24 HOURS
Refills: 0 | Status: DISCONTINUED | OUTPATIENT
Start: 2024-06-10 | End: 2024-06-14

## 2024-06-10 RX ORDER — ALLOPURINOL 300 MG
1 TABLET ORAL
Refills: 0 | DISCHARGE

## 2024-06-10 RX ORDER — OMEGA-3 ACID ETHYL ESTERS 1 G
1 CAPSULE ORAL
Refills: 0 | DISCHARGE

## 2024-06-10 RX ORDER — ASCORBIC ACID 60 MG
1 TABLET,CHEWABLE ORAL
Refills: 0 | DISCHARGE

## 2024-06-10 RX ORDER — COLCHICINE 0.6 MG
1 TABLET ORAL
Refills: 0 | DISCHARGE

## 2024-06-10 RX ORDER — SENNA PLUS 8.6 MG/1
1 TABLET ORAL
Refills: 0 | DISCHARGE

## 2024-06-10 RX ORDER — POTASSIUM CHLORIDE 20 MEQ
40 PACKET (EA) ORAL EVERY 4 HOURS
Refills: 0 | Status: DISCONTINUED | OUTPATIENT
Start: 2024-06-10 | End: 2024-06-11

## 2024-06-10 RX ORDER — DIPHENOXYLATE HCL/ATROPINE 2.5-.025MG
1 TABLET ORAL
Refills: 0 | Status: DISCONTINUED | OUTPATIENT
Start: 2024-06-10 | End: 2024-06-14

## 2024-06-10 RX ADMIN — Medication 15 MILLIGRAM(S): at 05:41

## 2024-06-10 RX ADMIN — ATORVASTATIN CALCIUM 20 MILLIGRAM(S): 80 TABLET, FILM COATED ORAL at 22:06

## 2024-06-10 RX ADMIN — ENOXAPARIN SODIUM 40 MILLIGRAM(S): 100 INJECTION SUBCUTANEOUS at 05:41

## 2024-06-10 RX ADMIN — Medication 40 MILLIEQUIVALENT(S): at 12:57

## 2024-06-10 RX ADMIN — Medication 15 MILLIGRAM(S): at 18:21

## 2024-06-10 RX ADMIN — Medication 150 MILLIGRAM(S): at 17:45

## 2024-06-10 RX ADMIN — Medication 15 MILLIGRAM(S): at 12:57

## 2024-06-10 RX ADMIN — Medication 40 MILLIEQUIVALENT(S): at 17:39

## 2024-06-10 RX ADMIN — Medication 1 TABLET(S): at 17:45

## 2024-06-10 RX ADMIN — Medication 100 MILLIEQUIVALENT(S): at 02:02

## 2024-06-10 RX ADMIN — Medication 15 MILLIGRAM(S): at 14:21

## 2024-06-10 RX ADMIN — Medication 40 MILLIEQUIVALENT(S): at 22:06

## 2024-06-10 RX ADMIN — Medication 100 MILLIEQUIVALENT(S): at 03:26

## 2024-06-10 RX ADMIN — Medication 100 MILLIEQUIVALENT(S): at 04:43

## 2024-06-10 RX ADMIN — MAGNESIUM OXIDE 400 MG ORAL TABLET 400 MILLIGRAM(S): 241.3 TABLET ORAL at 17:49

## 2024-06-10 RX ADMIN — Medication 100 MILLIGRAM(S): at 12:56

## 2024-06-10 RX ADMIN — Medication 40 MILLIEQUIVALENT(S): at 09:52

## 2024-06-10 RX ADMIN — ZOLPIDEM TARTRATE 5 MILLIGRAM(S): 10 TABLET ORAL at 22:12

## 2024-06-10 RX ADMIN — PANTOPRAZOLE SODIUM 40 MILLIGRAM(S): 20 TABLET, DELAYED RELEASE ORAL at 05:41

## 2024-06-10 RX ADMIN — Medication 15 MILLIGRAM(S): at 17:39

## 2024-06-10 NOTE — OCCUPATIONAL THERAPY INITIAL EVALUATION ADULT - ADDITIONAL COMMENTS
pt lives with wife, daughter, son-in-law, and grandchildren in private home with ~8 steps to enter and 1 flight to bedroom, bathroom. 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 walk-in shower +GB, +shower chair for wife. PTA pt was (I), +drives, and used no AD/DME.

## 2024-06-10 NOTE — OCCUPATIONAL THERAPY INITIAL EVALUATION ADULT - IMPAIRED TRANSFERS: BED/CHAIR, REHAB EVAL
b/l LE peripheral neuropathy/impaired balance/pain/decreased ROM/impaired sensory feedback/decreased strength

## 2024-06-10 NOTE — PHARMACOTHERAPY INTERVENTION NOTE - COMMENTS
Medication Reconciliation completed for patient.  These were confirmed with patient's medication list, Surescript, pharmacies ([facility]), and [facility].  Updated medications are reflected in Outpatient Medication Review.     The Drug Utilization Report below displays all of the controlled substance prescriptions, if any, that your patient has filled in the last twelve months. The information displayed on this report is compiled from pharmacy submissions to the Department, and accurately reflects the information as submitted by the pharmacies.    This report was requested by: Shanice Corona | Reference #: [id number]    Practitioner Count: 2  Pharmacy Count: 2  Current Opioid Prescriptions: 1  Current Benzodiazepine Prescriptions: 0  Current Stimulant Prescriptions: 0      Patient Demographic Information (PDI)       PDI	First Name	Last Name	Birth Date	Gender	Street Address	[facility]	Zip Code  A	Wilfred Osuna	1950	Male	[address]	[place]	[address]  B	Wilfred Osuna	1950	Male	[address]	[place]	[address]    Prescription Information      PDI Filter:    PDI	Current Rx	Drug Type	Rx Written	Rx Dispensed	Drug	Quantity	Days Supply	Prescriber Name	Prescriber ANUP #	Payment Method	Dispenser  A	Y		05/30/2024	05/31/2024	zolpidem tartrate 10 mg tablet	30	30	Rito Zhang	[id number]	Insurance	[facility] #[id number]  A	Y	O	05/28/2024	05/28/2024	tramadol hcl 50 mg tablet	120	30	GreenRicardo MD	[id number]	Insurance	[facility] #[id number]  A	Y		05/28/2024	05/28/2024	pregabalin 150 mg capsule	60	30	GreenRicardo MD	[id number]	Insurance	[facility] #[id number]  A	N	O	04/30/2024	05/01/2024	tramadol hcl 50 mg tablet	120	30	GreenRicardo MD	[id number]	Insurance	[facility] #[id number]  A	N		04/30/2024	05/01/2024	pregabalin 150 mg capsule	60	30	GreenRicardo MD	[id number]	Insurance	[facility] #[id number]  A	N		04/01/2024	04/21/2024	zolpidem tartrate 10 mg tablet	30	30	Rito Zhang	[id number]	Insurance	[facility] #[id number]  A	N		04/02/2024	04/04/2024	pregabalin 150 mg capsule	60	30	GreenRicardo MD	[id number]	[facility] #[id number]  A	N	O	04/01/2024	04/01/2024	tramadol hcl 50 mg tablet	120	30	Rito Zhang	[id number]	[facility] #[id number]  A	N	O	01/11/2024	01/11/2024	tramadol hcl 50 mg tablet	12	3	Dariana Aldridge	[id number]	[facility] #[id number]  A	N		12/04/2023	12/04/2023	zolpidem tartrate 10 mg tablet	30	30	Rito Zhang	[id number]	[facility] #[id number]  A	N	O	10/24/2023	10/24/2023	hydrocodone-acetaminophen 5-325 mg tablet	10	2	Anastasiya Escoto DDS	[id number]	[facility] #[id number]  A	N	O	09/03/2023	09/04/2023	diphenoxylate-atropine 2.5-0.025 mg tablet	60	30	Sathish Strickland DO	[id number]	[facility] #[id number]  A	N		07/14/2023	08/14/2023	zolpidem tart er 12.5 mg tab	30	30	Rito Zhang	[id number]	[facility] #[id number]    Home Medications:  allopurinol 300 mg oral tablet: 1 tab(s) orally once a day  Ambien 10 mg oral tablet: 1 tab(s) orally once a day (at bedtime)   amLODIPine 5 mg oral tablet: 1 tab(s) orally once a day   ascorbic acid 100 mg oral tablet: 1 tab(s) orally once a day  atorvastatin 20 mg oral tablet: 1 tab(s) orally once a day   cholecalciferol 50 mcg (2000 intl units) oral tablet: 1 tab(s) orally once a day  cyanocobalamin 100 mcg oral tablet: 1 tab(s) orally once a day   dicyclomine 20 mg oral tablet: 1 tab(s) orally once a day  diphenoxylate-atropine 2.5 mg-0.025 mg oral tablet: 1 tab(s) orally 2 times a day   Fish Oil 1000 mg oral capsule: 1 cap(s) orally once a day   fluticasone 50 mcg/inh inhalation powder: 1 puff(s) inhaled once a day  Lyrica 150 mg oral capsule: 1 cap(s) orally 2 times a day   Mitigare 0.6 mg oral capsule: 1 cap(s) orally once a day (Brand colchicine)   potassium gluconate 550 mg oral tablet: 1 tab(s) orally once a day  Salinex 0.4% nasal spray: 1 spray(s) nasal once a day  senna (sennosides) 8.6 mg oral tablet: 1 tab(s) orally once a day  torsemide 20 mg oral tablet: 1 tab(s) orally once a day  traMADol 50 mg oral tablet: 1 tab(s) orally every 6 hours as needed for  pain pt states he takes 4/day   zinc (as acetate) 25 mg oral capsule: 1 cap(s) orally once a day  ZyrTEC 10 mg oral tablet: 1 tab(s) orally once a day     Of note, patient fills majority of his medications with [facility] who delivers medications including his narcotics to his house.  Majority of these from [facility] did not appear in Surescripts. Patient also uses [facility] in [place] for all other medications.       Shanice Corona, PharmD, BCPS  Clinical Pharmacy Specialist  Available on Teams

## 2024-06-10 NOTE — PATIENT PROFILE ADULT - NSPRESCRALCFREQ_GEN_A_NUR
2-4 times a month Magnesium rider added to today's chemotherapy.  Return to clinic 3 weeks from interval CBC, CMP, LDH, magnesium, vitamin-D level, and beta 2 microglobulin.

## 2024-06-10 NOTE — CHART NOTE - NSCHARTNOTEFT_GEN_A_CORE
Rheumatology consulted to evaluate for acute L elbow pain.   Per pt and daughter, developed L elbow pain on 3-4 days ago.  Has a known hx of gout, last flare two years ago. Typically occurs on his toes, has never had flare in L elbow. Unclear who he follows with however has been compliant with allopurinol 300mg daily and colchicine 0.6mg daily ppx (also with Diphenoxylate-atrop 2.5mg for potential diarrhea from colchicine?)  ED attempted elbow joint tap multiple times, however unsuccessful.   Was started on IV toradol scheduled, denies significant improvement.   L elbow exam with erythema and warmth that extends past the elbow joint in both directions with significant TTP. As per daughter, erythema has spread since the weekend.   At this time, exam is concerning for potential concomitant cellulitis with extent of erythema, warmth, and pain on exam. Would not recommend additional arthrocentesis with overlying erythema that could be concerning for cellulitis as this could potentially lead to septic joint.   Recommend starting abx per primary for cellulitis and continuing scheduled NSAIDs   Will follow with formal consult note tomorrow.     Discussed with Dr. Chely Lozada MD   PGY-4  Reachable on TEAMS  Pager [phone]  Rheumatology Fellow Rheumatology consulted to evaluate for acute R elbow pain.   Per pt and daughter, developed R elbow pain on 3-4 days ago.  Has a known hx of gout, last flare two years ago. Typically occurs on his toes, has never had flare in R elbow. Unclear who he follows with however has been compliant with allopurinol 300mg daily and colchicine 0.6mg daily ppx (also with Diphenoxylate-atrop 2.5mg for potential diarrhea from colchicine?)  ED attempted elbow joint tap multiple times, however unsuccessful.   Was started on IV toradol scheduled, denies significant improvement.   R elbow exam with erythema and warmth that extends past the elbow joint in both directions with significant TTP. As per daughter, erythema has spread since the weekend.   At this time, exam is concerning for potential concomitant cellulitis with extent of erythema, warmth, and pain on exam. Would not recommend additional arthrocentesis with overlying erythema that could be concerning for cellulitis as this could potentially lead to septic joint.   Recommend starting abx per primary for cellulitis and continuing scheduled NSAIDs   Will follow with formal consult note tomorrow.     Discussed with Dr. Chely Lozada MD   PGY-4  Reachable on TEAMS  Pager [phone]  Rheumatology Fellow

## 2024-06-10 NOTE — ED ADULT NURSE NOTE - NSFALLRISKINTERV_ED_ALL_ED

## 2024-06-10 NOTE — OCCUPATIONAL THERAPY INITIAL EVALUATION ADULT - STRENGTHENING, PT EVAL
pt will increase R elbow muscle strength by half a grade within 4 weeks to improve functional capacity

## 2024-06-10 NOTE — CHART NOTE - NSCHARTNOTEFT_GEN_A_CORE
Patient Demographic Information (PDI)       PDI	First Name	Last Name	Birth Date	Gender	Street Address	[facility] Code  A	Wilfred Osuna	1950	Male	[address]	[place] [facility]	[address]  B	Wilfred Osuna	1950	Male	[address]	[place]	[address]    Prescription Information      PDI Filter:    PDI	Current Rx	Drug Type	Rx Written	Rx Dispensed	Drug	Quantity	Days Supply	Prescriber Name	Prescriber ANUP #	Payment Method	Dispenser  A	Y		05/30/2024	05/31/2024	zolpidem tartrate 10 mg tablet	30	30	Rito Zhang	[id number]	Insurance	[facility] #[id number]  A	Y	O	05/28/2024	05/28/2024	tramadol hcl 50 mg tablet	120	30	Ricardo Gunter MD	[id number]	Insurance	[facility] #[id number]  A	Y		05/28/2024	05/28/2024	pregabalin 150 mg capsule	60	30	Ricardo Gunter MD	[id number]	Insurance	[facility] #[id number]

## 2024-06-10 NOTE — OCCUPATIONAL THERAPY INITIAL EVALUATION ADULT - RANGE OF MOTION EXAMINATION, UPPER EXTREMITY
R elbow ROM limited by pain, shoulder, wrist, digits WFL/Left UE Active ROM was WFL (within functional limits)

## 2024-06-10 NOTE — PROGRESS NOTE ADULT - SUBJECTIVE AND OBJECTIVE BOX
SUBJECTIVE / OVERNIGHT EVENTS:  Today is hospital day 1d. There are no new issues or overnight events.   Did not endorse any headache, lightheadedness, vertigo, shortness of breathe, cough, chest pain, palpitations, tachycardia, abdominal pain, nausea, vomiting, diarrhea or constipation currently    HPI:  Pt is 73M HTN, AAA, SVT s/p ablation, gout, AAA, nutcracker esophagus pw elbow pain.    Reports worsening right elbow pain, edema, and erythema over the past week. 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 No F/C, injections. Has not taken allopurinol in about 2 years though prior flares have been in feet.     Of note does report diarrhea w/ increased watery stools. No recent abx use.     Does maintain a high meat diet w/ consumption of sugary drinks daily. Little EtOH use.    In the ED T 100.1, on 2L NC w/ WBC 12.96 and K 2.5 w/ CRP 91 and xray w/ soft tissue edema and small to mod effusion. Attempt to tap x3 by ED w/ all being dry taps. He was administered Morphine and KCl.    (09 Jun 2024 23:45)    MEDICATIONS  (STANDING):  allopurinol 300 milliGRAM(s) Oral daily  amLODIPine   Tablet 5 milliGRAM(s) Oral daily  atorvastatin 20 milliGRAM(s) Oral at bedtime  colchicine 0.6 milliGRAM(s) Oral daily  dicyclomine 20 milliGRAM(s) Oral daily  diphenoxylate/atropine 1 Tablet(s) Oral two times a day  enoxaparin Injectable 40 milliGRAM(s) SubCutaneous every 24 hours  ketorolac   Injectable 15 milliGRAM(s) IV Push every 6 hours  loratadine 10 milliGRAM(s) Oral daily  magnesium oxide 400 milliGRAM(s) Oral three times a day with meals  omega-3-Acid Ethyl Esters 4 Gram(s) Oral daily  pantoprazole    Tablet 40 milliGRAM(s) Oral before breakfast  potassium chloride    Tablet ER 40 milliEquivalent(s) Oral every 4 hours  potassium phosphate / sodium phosphate Powder (PHOS-NaK) 1 Packet(s) Oral once  pregabalin 150 milliGRAM(s) Oral two times a day  senna 2 Tablet(s) Oral at bedtime  sodium chloride 0.65% Nasal 1 Spray(s) Both Nostrils daily  torsemide 20 milliGRAM(s) Oral daily    MEDICATIONS  (PRN):  acetaminophen     Tablet .. 650 milliGRAM(s) Oral every 6 hours PRN Temp greater or equal to 38C (100.4F), Mild Pain (1 - 3)  traMADol 50 milliGRAM(s) Oral every 6 hours PRN for pain  zolpidem 5 milliGRAM(s) Oral at bedtime PRN Insomnia  zolpidem 5 milliGRAM(s) Oral at bedtime PRN Insomnia    HOME MEDICATIONS:  allopurinol 300 mg oral tablet: 1 tab(s) orally once a day  Ambien 10 mg oral tablet: 1 tab(s) orally once a day (at bedtime)  amLODIPine 5 mg oral tablet: 1 tab(s) orally once a day  ascorbic acid 100 mg oral tablet: 1 tab(s) orally once a day  atorvastatin 20 mg oral tablet: 1 tab(s) orally once a day  cholecalciferol 50 mcg (2000 intl units) oral tablet: 1 tab(s) orally once a day  cyanocobalamin 100 mcg oral tablet: 1 tab(s) orally once a day  dicyclomine 20 mg oral tablet: 1 tab(s) orally once a day  diphenoxylate-atropine 2.5 mg-0.025 mg oral tablet: 1 tab(s) orally 2 times a day  Fish Oil 1000 mg oral capsule: 1 cap(s) orally once a day  fluticasone 50 mcg/inh inhalation powder: 1 puff(s) inhaled once a day  Lyrica 150 mg oral capsule: 1 cap(s) orally 2 times a day  Mitigare 0.6 mg oral capsule: 1 cap(s) orally once a day (Brand colchicine)  potassium gluconate 550 mg oral tablet: 1 tab(s) orally once a day  Salinex 0.4% nasal spray: 1 spray(s) nasal once a day  senna (sennosides) 8.6 mg oral tablet: 1 tab(s) orally once a day  torsemide 20 mg oral tablet: 1 tab(s) orally once a day  traMADol 50 mg oral tablet: 1 tab(s) orally every 6 hours as needed for  pain pt states he takes 4/day  zinc (as acetate) 25 mg oral capsule: 1 cap(s) orally once a day  ZyrTEC 10 mg oral tablet: 1 tab(s) orally once a day    PHYSICAL EXAM  Vital Signs Last 24 Hrs  T(C): 36.9 (10 Edmund 2024 12:51), Max: 37.2 (09 Jun 2024 18:20)  T(F): 98.4 (10 Edmund 2024 12:51), Max: 99 (09 Jun 2024 18:20)  HR: 60 (10 Edmund 2024 12:51) (55 - 70)  BP: 153/81 (10 Edmund 2024 12:51) (130/61 - 168/72)  BP(mean): 91 (10 Edmund 2024 00:09) (91 - 91)  RR: 18 (10 Edmund 2024 12:51) (16 - 20)  SpO2: 90% (10 Edmund 2024 12:51) (90% - 97%)    Parameters below as of 10 Edmund 2024 12:51  Patient On (Oxygen Delivery Method): room air        CONSTITUTIONAL: Well-groomed, in no apparent distress;  EYES: No conjunctival or scleral injection, non-icteric;  ENMT: No external nasal lesions; Normal outer ears;  NECK: Trachea midline;  RESPIRATORY: Normal respiratory effort; Decreased breathe sounds bilaterally without wheeze/rhonchi/rales;  CARDIOVASCULAR: Regular rate and rhythm;  GASTROINTESTINAL: Non-distended; No palpable masses; No rebound/guarding;  EXTREMITIES:  No lower extremity edema; R elbow erythema, palpable effusion, tenderness  NEUROLOGY: A+O to person, place, and time; Does respond to commands appropriately;  PSYCHIATRY: Mood and Affect appropriate    LABS:                        12.2   10.31 )-----------( 151      ( 10 Edmund 2024 07:14 )             37.5     06-10    140  |  99  |  13  ----------------------------<  133<H>  2.6<LL>   |  29  |  0.83    Ca    8.7      10 Edmund 2024 07:13  Phos  1.5     06-10  Mg     1.8     06-10    TPro  7.2  /  Alb  3.9  /  TBili  1.3<H>  /  DBili  x   /  AST  49<H>  /  ALT  44  /  AlkPhos  112  06-09          Urinalysis Basic - ( 10 Edmund 2024 07:13 )    Color: x / Appearance: x / SG: x / pH: x  Gluc: 133 mg/dL / Ketone: x  / Bili: x / Urobili: x   Blood: x / Protein: x / Nitrite: x   Leuk Esterase: x / RBC: x / WBC x   Sq Epi: x / Non Sq Epi: x / Bacteria: x            RADIOLOGY & ADDITIONAL TESTS:  EKG

## 2024-06-10 NOTE — OCCUPATIONAL THERAPY INITIAL EVALUATION ADULT - IMPAIRMENTS CONTRIBUTING IMPAIRED BED MOBILITY, REHAB EVAL
impaired balance/impaired coordination/pain/decreased ROM/impaired sensory feedback/decreased strength

## 2024-06-10 NOTE — ED ADULT NURSE NOTE - NSFALLASSISTNEEDED_ED_ALL_ED
11/14/18 patient currently receiving key treated for PDL1 + lung cancer.  Last dose received on November 8, 2018.    11/15/18 - code status discussed with patient.  Patient and family did not give an answer or state wishes - stating they were praying for a miracle and would let God handle it.  Patient is to begin radiation, per Dr. Culp (radiation oncologist), to multiple targetable areas in the chest to see if some of the patient's pain can be relieved.  Pain slightly improved with increased pain medication, but patient still remains uncomfortable and had a hard time sleeping last night.      11/16/18 - Code status not decided upon by patient at this time.  Pathologic fracture to left posterior 7th rib and has sternal pain per Dr. Culp.  He will receive on dose of radiation for this per Dr. Culp.  He should follow up outpatient with Dr. Lagos upon resolution of acute illness to discuss continuation of systemic therapy.         Standing/Walking/Toileting/Moving from bed to stretcher

## 2024-06-10 NOTE — OCCUPATIONAL THERAPY INITIAL EVALUATION ADULT - LIGHT TOUCH SENSATION, LLE, REHAB EVAL
pt reports b/l peripheral neuropathy, difficulty feeling the feet on the floor when walking/mild impairment

## 2024-06-10 NOTE — ED ADULT NURSE NOTE - OBJECTIVE STATEMENT
73y M PMH gout, presents to the ED c/o R elbow/wrist pain. Pt reports 3-4days of atraumatic R elbow pain, swelling, redness. Pt reports pain is similar to previous gout flares. Pt febrile in ED. Denies nvd, chest pain, sob, urinary symptoms, fevers at home. Pt R elbow tender on exam, limited ROM. Pt aox4.  Comfort and safety maintained.

## 2024-06-10 NOTE — OCCUPATIONAL THERAPY INITIAL EVALUATION ADULT - DIAGNOSIS, OT EVAL
pt demonstrates deficits in balance, strength, ROM and with pain impacting functional capacity and ADL skills

## 2024-06-10 NOTE — OCCUPATIONAL THERAPY INITIAL EVALUATION ADULT - BALANCE TRAINING, PT EVAL
pt will increase dynamic standing balance grade to fair plus within 4 weeks to improve functional mobility

## 2024-06-10 NOTE — OCCUPATIONAL THERAPY INITIAL EVALUATION ADULT - PERTINENT HX OF CURRENT PROBLEM, REHAB EVAL
Pt is 73M HTN, AAA, SVT s/p ablation, gout, AAA, nutcracker esophagus p/w elbow pain. Reports worsening right elbow pain, edema, and erythema over the past week. 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 No F/C, injections. Has not taken allopurinol in about 2 years though prior flares have been in feet. Of note does report diarrhea w/ increased watery stools. No recent abx use. Does maintain a high meat diet w/ consumption of sugary drinks daily. Little EtOH use. In the ED T 100.1, on 2L NC w/ WBC 12.96 and K 2.5 w/ CRP 91 and xray w/ soft tissue edema and small to mod effusion. Attempt to tap x3 by ED w/ all being dry taps. He was administered Morphine and KCl. (5/9) XRAY ELBOW R: Limited by lack of a true lateral view. No acute displaced fracture or dislocation.  Soft tissue swelling and small to moderate elbow joint effusion, nonspecific.

## 2024-06-10 NOTE — PATIENT PROFILE ADULT - FALL HARM RISK - RISK INTERVENTIONS

## 2024-06-11 DIAGNOSIS — L03.90 CELLULITIS, UNSPECIFIED: ICD-10-CM

## 2024-06-11 DIAGNOSIS — M54.2 CERVICALGIA: ICD-10-CM

## 2024-06-11 LAB
ANION GAP SERPL CALC-SCNC: 12 MMOL/L — SIGNIFICANT CHANGE UP (ref 5–17)
ANION GAP SERPL CALC-SCNC: 14 MMOL/L — SIGNIFICANT CHANGE UP (ref 5–17)
BUN SERPL-MCNC: 12 MG/DL — SIGNIFICANT CHANGE UP (ref 7–23)
BUN SERPL-MCNC: 13 MG/DL — SIGNIFICANT CHANGE UP (ref 7–23)
CALCIUM SERPL-MCNC: 8.7 MG/DL — SIGNIFICANT CHANGE UP (ref 8.4–10.5)
CALCIUM SERPL-MCNC: 8.9 MG/DL — SIGNIFICANT CHANGE UP (ref 8.4–10.5)
CHLORIDE SERPL-SCNC: 101 MMOL/L — SIGNIFICANT CHANGE UP (ref 96–108)
CHLORIDE SERPL-SCNC: 103 MMOL/L — SIGNIFICANT CHANGE UP (ref 96–108)
CO2 SERPL-SCNC: 27 MMOL/L — SIGNIFICANT CHANGE UP (ref 22–31)
CO2 SERPL-SCNC: 28 MMOL/L — SIGNIFICANT CHANGE UP (ref 22–31)
CREAT SERPL-MCNC: 0.81 MG/DL — SIGNIFICANT CHANGE UP (ref 0.5–1.3)
CREAT SERPL-MCNC: 0.94 MG/DL — SIGNIFICANT CHANGE UP (ref 0.5–1.3)
EGFR: 86 ML/MIN/1.73M2 — SIGNIFICANT CHANGE UP
EGFR: 93 ML/MIN/1.73M2 — SIGNIFICANT CHANGE UP
GLUCOSE SERPL-MCNC: 108 MG/DL — HIGH (ref 70–99)
GLUCOSE SERPL-MCNC: 123 MG/DL — HIGH (ref 70–99)
HCT VFR BLD CALC: 38 % — LOW (ref 39–50)
HGB BLD-MCNC: 12.2 G/DL — LOW (ref 13–17)
MCHC RBC-ENTMCNC: 30 PG — SIGNIFICANT CHANGE UP (ref 27–34)
MCHC RBC-ENTMCNC: 32.1 GM/DL — SIGNIFICANT CHANGE UP (ref 32–36)
MCV RBC AUTO: 93.6 FL — SIGNIFICANT CHANGE UP (ref 80–100)
NRBC # BLD: 0 /100 WBCS — SIGNIFICANT CHANGE UP (ref 0–0)
PLATELET # BLD AUTO: 163 K/UL — SIGNIFICANT CHANGE UP (ref 150–400)
POTASSIUM SERPL-MCNC: 3.2 MMOL/L — LOW (ref 3.5–5.3)
POTASSIUM SERPL-MCNC: 3.7 MMOL/L — SIGNIFICANT CHANGE UP (ref 3.5–5.3)
POTASSIUM SERPL-SCNC: 3.2 MMOL/L — LOW (ref 3.5–5.3)
POTASSIUM SERPL-SCNC: 3.7 MMOL/L — SIGNIFICANT CHANGE UP (ref 3.5–5.3)
RBC # BLD: 4.06 M/UL — LOW (ref 4.2–5.8)
RBC # FLD: 13.1 % — SIGNIFICANT CHANGE UP (ref 10.3–14.5)
SODIUM SERPL-SCNC: 141 MMOL/L — SIGNIFICANT CHANGE UP (ref 135–145)
SODIUM SERPL-SCNC: 144 MMOL/L — SIGNIFICANT CHANGE UP (ref 135–145)
WBC # BLD: 10.35 K/UL — SIGNIFICANT CHANGE UP (ref 3.8–10.5)
WBC # FLD AUTO: 10.35 K/UL — SIGNIFICANT CHANGE UP (ref 3.8–10.5)

## 2024-06-11 PROCEDURE — 99233 SBSQ HOSP IP/OBS HIGH 50: CPT | Mod: GC

## 2024-06-11 PROCEDURE — 99222 1ST HOSP IP/OBS MODERATE 55: CPT

## 2024-06-11 PROCEDURE — 99233 SBSQ HOSP IP/OBS HIGH 50: CPT

## 2024-06-11 RX ORDER — VANCOMYCIN HCL 1 G
1000 VIAL (EA) INTRAVENOUS ONCE
Refills: 0 | Status: COMPLETED | OUTPATIENT
Start: 2024-06-11 | End: 2024-06-11

## 2024-06-11 RX ORDER — VANCOMYCIN HCL 1 G
1000 VIAL (EA) INTRAVENOUS EVERY 12 HOURS
Refills: 0 | Status: COMPLETED | OUTPATIENT
Start: 2024-06-12 | End: 2024-06-12

## 2024-06-11 RX ORDER — POTASSIUM CHLORIDE 20 MEQ
40 PACKET (EA) ORAL EVERY 4 HOURS
Refills: 0 | Status: COMPLETED | OUTPATIENT
Start: 2024-06-11 | End: 2024-06-11

## 2024-06-11 RX ORDER — POTASSIUM CHLORIDE 20 MEQ
40 PACKET (EA) ORAL ONCE
Refills: 0 | Status: COMPLETED | OUTPATIENT
Start: 2024-06-11 | End: 2024-06-11

## 2024-06-11 RX ORDER — VANCOMYCIN HCL 1 G
VIAL (EA) INTRAVENOUS
Refills: 0 | Status: COMPLETED | OUTPATIENT
Start: 2024-06-11 | End: 2024-06-12

## 2024-06-11 RX ADMIN — ENOXAPARIN SODIUM 40 MILLIGRAM(S): 100 INJECTION SUBCUTANEOUS at 05:15

## 2024-06-11 RX ADMIN — Medication 300 MILLIGRAM(S): at 13:07

## 2024-06-11 RX ADMIN — Medication 650 MILLIGRAM(S): at 14:35

## 2024-06-11 RX ADMIN — Medication 650 MILLIGRAM(S): at 22:06

## 2024-06-11 RX ADMIN — Medication 40 MILLIEQUIVALENT(S): at 05:15

## 2024-06-11 RX ADMIN — Medication 650 MILLIGRAM(S): at 22:07

## 2024-06-11 RX ADMIN — Medication 20 MILLIGRAM(S): at 05:15

## 2024-06-11 RX ADMIN — Medication 15 MILLIGRAM(S): at 00:35

## 2024-06-11 RX ADMIN — Medication 150 MILLIGRAM(S): at 05:15

## 2024-06-11 RX ADMIN — Medication 1 TABLET(S): at 05:14

## 2024-06-11 RX ADMIN — LORATADINE 10 MILLIGRAM(S): 10 TABLET ORAL at 13:07

## 2024-06-11 RX ADMIN — AMLODIPINE BESYLATE 5 MILLIGRAM(S): 2.5 TABLET ORAL at 05:14

## 2024-06-11 RX ADMIN — Medication 250 MILLIGRAM(S): at 16:20

## 2024-06-11 RX ADMIN — PANTOPRAZOLE SODIUM 40 MILLIGRAM(S): 20 TABLET, DELAYED RELEASE ORAL at 05:14

## 2024-06-11 RX ADMIN — SENNA PLUS 2 TABLET(S): 8.6 TABLET ORAL at 22:07

## 2024-06-11 RX ADMIN — TRAMADOL HYDROCHLORIDE 50 MILLIGRAM(S): 50 TABLET ORAL at 20:02

## 2024-06-11 RX ADMIN — TRAMADOL HYDROCHLORIDE 50 MILLIGRAM(S): 50 TABLET ORAL at 11:12

## 2024-06-11 RX ADMIN — Medication 1 TABLET(S): at 17:01

## 2024-06-11 RX ADMIN — MAGNESIUM OXIDE 400 MG ORAL TABLET 400 MILLIGRAM(S): 241.3 TABLET ORAL at 08:51

## 2024-06-11 RX ADMIN — ATORVASTATIN CALCIUM 20 MILLIGRAM(S): 80 TABLET, FILM COATED ORAL at 22:07

## 2024-06-11 RX ADMIN — TRAMADOL HYDROCHLORIDE 50 MILLIGRAM(S): 50 TABLET ORAL at 21:07

## 2024-06-11 RX ADMIN — Medication 0.6 MILLIGRAM(S): at 13:07

## 2024-06-11 RX ADMIN — ZOLPIDEM TARTRATE 5 MILLIGRAM(S): 10 TABLET ORAL at 22:07

## 2024-06-11 RX ADMIN — Medication 40 MILLIEQUIVALENT(S): at 13:05

## 2024-06-11 RX ADMIN — Medication 1 SPRAY(S): at 13:05

## 2024-06-11 RX ADMIN — Medication 4 GRAM(S): at 13:05

## 2024-06-11 RX ADMIN — Medication 20 MILLIGRAM(S): at 13:07

## 2024-06-11 RX ADMIN — Medication 40 MILLIEQUIVALENT(S): at 10:07

## 2024-06-11 RX ADMIN — Medication 650 MILLIGRAM(S): at 15:42

## 2024-06-11 RX ADMIN — Medication 150 MILLIGRAM(S): at 17:01

## 2024-06-11 RX ADMIN — TRAMADOL HYDROCHLORIDE 50 MILLIGRAM(S): 50 TABLET ORAL at 12:20

## 2024-06-11 RX ADMIN — MAGNESIUM OXIDE 400 MG ORAL TABLET 400 MILLIGRAM(S): 241.3 TABLET ORAL at 13:09

## 2024-06-11 NOTE — PROGRESS NOTE ADULT - SUBJECTIVE AND OBJECTIVE BOX
Patient is a 73y old  Male who presents with a chief complaint of Gout Flare (11 Jun 2024 13:17)      SUBJECTIVE / OVERNIGHT EVENTS: pt reports he has limited range of motion of his neck, reports the bed mattress was deflated may have been from sleeping, no cp, sob, chills     MEDICATIONS  (STANDING):  allopurinol 300 milliGRAM(s) Oral daily  amLODIPine   Tablet 5 milliGRAM(s) Oral daily  atorvastatin 20 milliGRAM(s) Oral at bedtime  colchicine 0.6 milliGRAM(s) Oral daily  dicyclomine 20 milliGRAM(s) Oral daily  diphenoxylate/atropine 1 Tablet(s) Oral two times a day  enoxaparin Injectable 40 milliGRAM(s) SubCutaneous every 24 hours  loratadine 10 milliGRAM(s) Oral daily  methocarbamol 500 milliGRAM(s) Oral once  omega-3-Acid Ethyl Esters 4 Gram(s) Oral daily  pantoprazole    Tablet 40 milliGRAM(s) Oral before breakfast  pregabalin 150 milliGRAM(s) Oral two times a day  senna 2 Tablet(s) Oral at bedtime  sodium chloride 0.65% Nasal 1 Spray(s) Both Nostrils daily  torsemide 20 milliGRAM(s) Oral daily  vancomycin  IVPB        MEDICATIONS  (PRN):  acetaminophen     Tablet .. 650 milliGRAM(s) Oral every 6 hours PRN Temp greater or equal to 38C (100.4F), Mild Pain (1 - 3)  traMADol 50 milliGRAM(s) Oral every 6 hours PRN for pain  zolpidem 5 milliGRAM(s) Oral at bedtime PRN Insomnia  zolpidem 5 milliGRAM(s) Oral at bedtime PRN Insomnia        CAPILLARY BLOOD GLUCOSE        I&O's Summary    11 Jun 2024 07:01  -  11 Jun 2024 14:19  --------------------------------------------------------  IN: 360 mL / OUT: 0 mL / NET: 360 mL        PHYSICAL EXAM:  GENERAL: NAD, well-developed  HEAD:  Atraumatic, Normocephalic  EYES: conjunctiva and sclera clear  NECK: limited range of motion in all directions   CHEST/LUNG: Clear to auscultation bilaterally; No wheeze  HEART: Regular rate and rhythm; S1S2  ABDOMEN: Soft, Nontender, Nondistended; Bowel sounds present  EXTREMITIES:  2+ Peripheral Pulses, chronic stasis changes LE bl, +erythema over right elbow, warm   PSYCH: AAOx3      LABS:                        12.2   10.35 )-----------( 163      ( 11 Jun 2024 07:22 )             38.0     06-11    144  |  103  |  12  ----------------------------<  108<H>  3.2<L>   |  27  |  0.81    Ca    8.7      11 Jun 2024 07:22  Phos  1.5     06-10  Mg     1.8     06-10    TPro  7.2  /  Alb  3.9  /  TBili  1.3<H>  /  DBili  x   /  AST  49<H>  /  ALT  44  /  AlkPhos  112  06-09          Urinalysis Basic - ( 11 Jun 2024 07:22 )    Color: x / Appearance: x / SG: x / pH: x  Gluc: 108 mg/dL / Ketone: x  / Bili: x / Urobili: x   Blood: x / Protein: x / Nitrite: x   Leuk Esterase: x / RBC: x / WBC x   Sq Epi: x / Non Sq Epi: x / Bacteria: x        RADIOLOGY & ADDITIONAL TESTS:    Imaging Personally Reviewed:    Consultant(s) Notes Reviewed:      Care Discussed with Consultants/Other Providers:

## 2024-06-11 NOTE — PROGRESS NOTE ADULT - ASSESSMENT
73M HTN, AAA, SVT s/p ablation, gout, AAA, nutcracker esophagus pw elbow pain likely i/s/o gout flare vs cellulitis

## 2024-06-11 NOTE — CONSULT NOTE ADULT - SUBJECTIVE AND OBJECTIVE BOX
FELICIANO PREETI  [id number]    HISTORY OF PRESENT ILLNESS:  Pt is 73M HTN, AAA, SVT s/p ablation, gout, AAA, nutcracker esophagus pw elbow pain.  Reports worsening right elbow pain, edema, and erythema over the past week. 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 No F/C, injections. Has not taken allopurinol in about 2 years though prior flares have been in feet.   Of note does report diarrhea w/ increased watery stools. No recent abx use.   Does maintain a high meat diet w/ consumption of sugary drinks daily. Little EtOH use.  In the ED T 100.1, on 2L NC w/ WBC 12.96 and K 2.5 w/ CRP 91 and xray w/ soft tissue edema and small to mod effusion. Attempt to tap x3 by ED w/ all being dry taps. He was administered Morphine and KCl.    (09 Jun 2024 23:45)    Rheumatology consulted for potential gout flare of R elbow  Per pt and daughter, developed R elbow pain on 3-4 days ago.  Has a known hx of gout, last flare two years ago. Typically occurs on his toes, has never had flare in R elbow. Unclear who he follows with however has been compliant with allopurinol 300mg daily. Colchicine 0.6mg daily also listed in his med list brought in by his daughter, however per pt he stopped taking that since he has not had recent gout flares.   ED attempted elbow joint tap multiple times, however unsuccessful.   Was started on IV toradol scheduled, denies significant improvement.   R elbow exam with erythema and warmth that extends past the elbow joint in both directions with significant TTP. As per daughter, erythema has spread since the weekend.      Risk factors: diuretic use (on torsemide), metabolic syndrome, high purine diet    Rheum ROS  See above       MEDICATIONS  (STANDING):  allopurinol 300 milliGRAM(s) Oral daily  amLODIPine   Tablet 5 milliGRAM(s) Oral daily  atorvastatin 20 milliGRAM(s) Oral at bedtime  colchicine 0.6 milliGRAM(s) Oral daily  dicyclomine 20 milliGRAM(s) Oral daily  diphenoxylate/atropine 1 Tablet(s) Oral two times a day  enoxaparin Injectable 40 milliGRAM(s) SubCutaneous every 24 hours  loratadine 10 milliGRAM(s) Oral daily  methocarbamol 500 milliGRAM(s) Oral once  omega-3-Acid Ethyl Esters 4 Gram(s) Oral daily  pantoprazole    Tablet 40 milliGRAM(s) Oral before breakfast  pregabalin 150 milliGRAM(s) Oral two times a day  senna 2 Tablet(s) Oral at bedtime  sodium chloride 0.65% Nasal 1 Spray(s) Both Nostrils daily  torsemide 20 milliGRAM(s) Oral daily    MEDICATIONS  (PRN):  acetaminophen     Tablet .. 650 milliGRAM(s) Oral every 6 hours PRN Temp greater or equal to 38C (100.4F), Mild Pain (1 - 3)  traMADol 50 milliGRAM(s) Oral every 6 hours PRN for pain  zolpidem 5 milliGRAM(s) Oral at bedtime PRN Insomnia  zolpidem 5 milliGRAM(s) Oral at bedtime PRN Insomnia      Allergies    sulfa drugs (Rash)  penicillin (Unknown)    Intolerances        PERTINENT MEDICATION HISTORY:    SOCIAL HISTORY:  OCCUPATION:  TRAVEL HISTORY:    FAMILY HISTORY:  CAD (coronary artery disease) (Sibling)        Vital Signs Last 24 Hrs  T(C): 36.4 (11 Jun 2024 12:29), Max: 37.1 (10 Edmund 2024 21:03)  T(F): 97.6 (11 Jun 2024 12:29), Max: 98.7 (10 Edmund 2024 21:03)  HR: 66 (11 Jun 2024 12:29) (61 - 81)  BP: 165/71 (11 Jun 2024 12:29) (151/84 - 165/86)  BP(mean): --  RR: 18 (11 Jun 2024 12:29) (18 - 18)  SpO2: 96% (11 Jun 2024 12:29) (93% - 96%)    Parameters below as of 11 Jun 2024 12:29  Patient On (Oxygen Delivery Method): room air        Physical Exam:  General: No apparent distress  HEENT: EOMI, MMM  CVS: +S1/S2, RRR, no murmurs/rubs/gallops  Resp: CTA b/l. No crackles/wheezing  GI: Soft, NT/ND +BS  MSK: no swelling/warmth/erythema of the joints of the UE/LE  Neuro: AAOx3  Skin: no visible rashes    LABS:                        12.2   10.35 )-----------( 163      ( 11 Jun 2024 07:22 )             38.0     06-11    144  |  103  |  12  ----------------------------<  108<H>  3.2<L>   |  27  |  0.81    Ca    8.7      11 Jun 2024 07:22  Phos  1.5     06-10  Mg     1.8     06-10    TPro  7.2  /  Alb  3.9  /  TBili  1.3<H>  /  DBili  x   /  AST  49<H>  /  ALT  44  /  AlkPhos  112  06-09      Urinalysis Basic - ( 11 Jun 2024 07:22 )    Color: x / Appearance: x / SG: x / pH: x  Gluc: 108 mg/dL / Ketone: x  / Bili: x / Urobili: x   Blood: x / Protein: x / Nitrite: x   Leuk Esterase: x / RBC: x / WBC x   Sq Epi: x / Non Sq Epi: x / Bacteria: x            Rheumatology Work Up    Sedimentation Rate, Erythrocyte: 41 mm/hr *H* [0 - 20] (06-09-24 @ 17:07)  C-Reactive Protein: 91 mg/L *H* [0 - 4] (06-09-24 @ 17:07)            RADIOLOGY & ADDITIONAL STUDIES:     FELICIANO PREETI  [id number]    HISTORY OF PRESENT ILLNESS:  Pt is 73M HTN, AAA, SVT s/p ablation, gout, AAA, nutcracker esophagus pw elbow pain.  Reports worsening right elbow pain, edema, and erythema over the past week. 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 No F/C, injections. Has not taken allopurinol in about 2 years though prior flares have been in feet.   Of note does report diarrhea w/ increased watery stools. No recent abx use.   Does maintain a high meat diet w/ consumption of sugary drinks daily. Little EtOH use.  In the ED T 100.1, on 2L NC w/ WBC 12.96 and K 2.5 w/ CRP 91 and xray w/ soft tissue edema and small to mod effusion. Attempt to tap x3 by ED w/ all being dry taps. He was administered Morphine and KCl.    (09 Jun 2024 23:45)    Rheumatology consulted for potential gout flare of R elbow  Per pt and daughter, developed R elbow pain 3-4 days ago.  Has a known hx of gout, last flare two years ago. Typically occurs on his toes, has never had flare in R elbow. Unclear who he follows with however has been compliant with allopurinol 300mg daily (multiple notes saying he has not been taking, however daughter confirmed that he does indeed take it). Colchicine 0.6mg daily also listed in his med list brought in by his daughter, however per pt he stopped taking that since he has not had recent gout flares.   ED attempted elbow joint tap multiple times, however unsuccessful.   Was started on IV toradol scheduled, denies significant improvement.   R elbow exam with erythema and warmth that extends past the elbow joint in both directions with significant TTP. As per daughter, erythema has spread since the weekend.  As well, pt reports worsening pain and ROM since being hospitalized.   Denies trauma to the elbow    Risk factors: diuretic use (on torsemide), metabolic syndrome, high purine diet    Rheum ROS  See above       MEDICATIONS  (STANDING):  allopurinol 300 milliGRAM(s) Oral daily  amLODIPine   Tablet 5 milliGRAM(s) Oral daily  atorvastatin 20 milliGRAM(s) Oral at bedtime  colchicine 0.6 milliGRAM(s) Oral daily  dicyclomine 20 milliGRAM(s) Oral daily  diphenoxylate/atropine 1 Tablet(s) Oral two times a day  enoxaparin Injectable 40 milliGRAM(s) SubCutaneous every 24 hours  loratadine 10 milliGRAM(s) Oral daily  methocarbamol 500 milliGRAM(s) Oral once  omega-3-Acid Ethyl Esters 4 Gram(s) Oral daily  pantoprazole    Tablet 40 milliGRAM(s) Oral before breakfast  pregabalin 150 milliGRAM(s) Oral two times a day  senna 2 Tablet(s) Oral at bedtime  sodium chloride 0.65% Nasal 1 Spray(s) Both Nostrils daily  torsemide 20 milliGRAM(s) Oral daily    MEDICATIONS  (PRN):  acetaminophen     Tablet .. 650 milliGRAM(s) Oral every 6 hours PRN Temp greater or equal to 38C (100.4F), Mild Pain (1 - 3)  traMADol 50 milliGRAM(s) Oral every 6 hours PRN for pain  zolpidem 5 milliGRAM(s) Oral at bedtime PRN Insomnia  zolpidem 5 milliGRAM(s) Oral at bedtime PRN Insomnia      Allergies    sulfa drugs (Rash)  penicillin (Unknown)    Intolerances        PERTINENT MEDICATION HISTORY:    SOCIAL HISTORY:  OCCUPATION:  TRAVEL HISTORY:    FAMILY HISTORY:  CAD (coronary artery disease) (Sibling)    Vital Signs Last 24 Hrs  T(C): 36.4 (11 Jun 2024 12:29), Max: 37.1 (10 Edmund 2024 21:03)  T(F): 97.6 (11 Jun 2024 12:29), Max: 98.7 (10 Edmund 2024 21:03)  HR: 66 (11 Jun 2024 12:29) (61 - 81)  BP: 165/71 (11 Jun 2024 12:29) (151/84 - 165/86)  BP(mean): --  RR: 18 (11 Jun 2024 12:29) (18 - 18)  SpO2: 96% (11 Jun 2024 12:29) (93% - 96%)    Parameters below as of 11 Jun 2024 12:29  Patient On (Oxygen Delivery Method): room air    Physical Exam:  General: No apparent distress  HEENT: EOMI  Resp: No increased WOB   GI: non-distended   MSK/Skin: R elbow with limited ROM with extension and flexion, overlying erythema that extends up biceps and down forearm. Significant TTP at elbow and warmth   Neuro: AAOx3    LABS:                        12.2   10.35 )-----------( 163      ( 11 Jun 2024 07:22 )             38.0     06-11    144  |  103  |  12  ----------------------------<  108<H>  3.2<L>   |  27  |  0.81    Ca    8.7      11 Jun 2024 07:22  Phos  1.5     06-10  Mg     1.8     06-10    TPro  7.2  /  Alb  3.9  /  TBili  1.3<H>  /  DBili  x   /  AST  49<H>  /  ALT  44  /  AlkPhos  112  06-09      Urinalysis Basic - ( 11 Jun 2024 07:22 )    Color: x / Appearance: x / SG: x / pH: x  Gluc: 108 mg/dL / Ketone: x  / Bili: x / Urobili: x   Blood: x / Protein: x / Nitrite: x   Leuk Esterase: x / RBC: x / WBC x   Sq Epi: x / Non Sq Epi: x / Bacteria: x      Rheumatology Work Up    Sedimentation Rate, Erythrocyte: 41 mm/hr *H* [0 - 20] (06-09-24 @ 17:07)  C-Reactive Protein: 91 mg/L *H* [0 - 4] (06-09-24 @ 17:07)      RADIOLOGY & ADDITIONAL STUDIES:  < from: MR Elbow No Cont, Right (06.10.24 @ 16:10) >  FINDINGS/  IMPRESSION:    Evaluation is markedly limited as the examination was prematurely   terminated secondary to patient discomfort, resulting in only 2 motion   degraded axial sequences.    Within these limits, there is no definite confluent T1 marrow signal   abnormality to suggest sequelae of acute osteomyelitis. There is a large   nonspecific joint effusion. Correlation with arthrocentesis may be of   utility to entirely exclude superinfection/septic arthritis.    < end of copied text >

## 2024-06-11 NOTE — PHYSICAL THERAPY INITIAL EVALUATION ADULT - PLANNED THERAPY INTERVENTIONS, PT EVAL
GOAL: Pt will negotiate 15 steps  up and down using HR support, independent  within 2-3 weeks./balance training/bed mobility training/gait training/transfer training

## 2024-06-11 NOTE — CONSULT NOTE ADULT - ATTENDING COMMENTS
pt seen and examined by me personally  pt aware of ongoing plans and impression  I spoke with the ID attending about this case as well  pending f/u by Ortho as above

## 2024-06-11 NOTE — PHYSICAL THERAPY INITIAL EVALUATION ADULT - ADDITIONAL COMMENTS
Patient lives in pvt house with family 8  steps to enter. one flight inside. Family available to assist at all times.

## 2024-06-11 NOTE — CONSULT NOTE ADULT - ASSESSMENT
72 yo M AAA, SVT, nutcracker esophagus, with elbow pain  Leukocytosis, no fever  Here with R sided elbow pain  Prior episodes of gout, has not been on allourinol  Limited MR with no signs OM  Unclear diagnosis at this point, on treatment for septic joint vs bursistis, possible underlying gout?  Overall, Wound infection, elbow pain  - Vanco 1g q 12 (monitor levels)  - Low threshold to add Ceftriaxone if not improving  - F/U Rheumatology--if recommending against arthrocentesis, do they still have concern that this could be gout vs septic joint?  - Trend WBC to normal  - Monitor for alternate signs infection  - Monitor site for improvement/worsening    Wilfred Raymond MD  Contact on TEAMS messaging from 9am - 5pm  From 5pm-9am, on weekends, or if no response call [phone]

## 2024-06-11 NOTE — CONSULT NOTE ADULT - SUBJECTIVE AND OBJECTIVE BOX
"HPI:  Pt is 73M HTN, AAA, SVT s/p ablation, gout, AAA, nutcracker esophagus pw elbow pain.    Reports worsening right elbow pain, edema, and erythema over the past week. 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 No F/C, injections. Has not taken allopurinol in about 2 years though prior flares have been in feet.     Of note does report diarrhea w/ increased watery stools. No recent abx use.     Does maintain a high meat diet w/ consumption of sugary drinks daily. Little EtOH use.    In the ED T 100.1, on 2L NC w/ WBC 12.96 and K 2.5 w/ CRP 91 and xray w/ soft tissue edema and small to mod effusion. Attempt to tap x3 by ED w/ all being dry taps. He was administered Morphine and KCl.    (09 Jun 2024 23:45)"    Above reviewed. 74 yo M AAA, SVT, nutcracker esophagus, with elbow pain  no trauma to elbow, but has had worsening pain, edema, erythema  Prior episodes of gout  No fevers, no chills  No cough, sob  No abd pain, no diarrhea  ID called for further eval    PAST MEDICAL & SURGICAL HISTORY:  Hypertension    GERD (gastroesophageal reflux disease)    Insomnia    Fall  with several injuries    TIA (transient ischemic attack)  2008    Aneurysm  of the Aortic Root    Dizziness    Gout      Measles      Mumps      Nutcracker esophagus      Back pain      SVT (supraventricular tachycardia)      TIA (transient ischemic attack)      OA (osteoarthritis)      Nutcracker esophagus      Vertigo      Tinnitus      S/P knee replacement  bilateral-2006      S/P lumbar and lumbosacral fusion by anterior technique  2004      S/P spinal fusion  C 5,6,7-2009      S/P spinal fusion  2009-L2-L3 L3-L4      H/O cardiac catheterization      History of appendectomy    Allergies    sulfa drugs (Rash)  penicillin (Unknown)    Intolerances    ANTIMICROBIALS:  vancomycin  IVPB    vancomycin  IVPB 1000 once    OTHER MEDS:  acetaminophen     Tablet .. 650 milliGRAM(s) Oral every 6 hours PRN  allopurinol 300 milliGRAM(s) Oral daily  amLODIPine   Tablet 5 milliGRAM(s) Oral daily  atorvastatin 20 milliGRAM(s) Oral at bedtime  colchicine 0.6 milliGRAM(s) Oral daily  dicyclomine 20 milliGRAM(s) Oral daily  diphenoxylate/atropine 1 Tablet(s) Oral two times a day  enoxaparin Injectable 40 milliGRAM(s) SubCutaneous every 24 hours  loratadine 10 milliGRAM(s) Oral daily  methocarbamol 500 milliGRAM(s) Oral once  omega-3-Acid Ethyl Esters 4 Gram(s) Oral daily  pantoprazole    Tablet 40 milliGRAM(s) Oral before breakfast  pregabalin 150 milliGRAM(s) Oral two times a day  senna 2 Tablet(s) Oral at bedtime  sodium chloride 0.65% Nasal 1 Spray(s) Both Nostrils daily  torsemide 20 milliGRAM(s) Oral daily  traMADol 50 milliGRAM(s) Oral every 6 hours PRN  zolpidem 5 milliGRAM(s) Oral at bedtime PRN  zolpidem 5 milliGRAM(s) Oral at bedtime PRN    SOCIAL HISTORY: No tobacco, no alcohol, no illicit drugs    FAMILY HISTORY:  CAD (coronary artery disease) (Sibling)    Drug Dosing Weight  Height (cm): 185.4 (10 Edmund 2024 00:09)  Weight (kg): 101.8 (10 Edmund 2024 00:09)  BMI (kg/m2): 29.6 (10 Edmund 2024 00:09)  BSA (m2): 2.26 (10 Edmund 2024 00:09)    PE:    Vital Signs Last 24 Hrs  T(C): 36.4 (11 Jun 2024 12:29), Max: 37.1 (10 Edmund 2024 21:03)  T(F): 97.6 (11 Jun 2024 12:29), Max: 98.7 (10 Edmund 2024 21:03)  HR: 66 (11 Jun 2024 12:29) (61 - 81)  BP: 165/71 (11 Jun 2024 12:29) (151/84 - 165/86)  RR: 18 (11 Jun 2024 12:29) (18 - 18)  SpO2: 96% (11 Jun 2024 12:29) (93% - 96%)    Gen: AOx3, NAD, non-toxic, pleasant  CV: S1+S2 normal, nontachycardic  Resp: Clear bilat, no resp distress, no crackles/wheezes  Abd: Soft, nontender, +BS  Ext: R elbow bandaged, no spreading redness  : No [place]  IV/Skin: No thrombophlebitis  Msk: No low back pain, no arthralgias, no joint swelling  Neuro: No sensory deficits, no motor deficits    LABS:                        12.2   10.35 )-----------( 163      ( 11 Jun 2024 07:22 )             38.0     06-11    144  |  103  |  12  ----------------------------<  108<H>  3.2<L>   |  27  |  0.81    Ca    8.7      11 Jun 2024 07:22  Phos  1.5     06-10  Mg     1.8     06-10    TPro  7.2  /  Alb  3.9  /  TBili  1.3<H>  /  DBili  x   /  AST  49<H>  /  ALT  44  /  AlkPhos  112  06-09    Urinalysis Basic - ( 11 Jun 2024 07:22 )    Color: x / Appearance: x / SG: x / pH: x  Gluc: 108 mg/dL / Ketone: x  / Bili: x / Urobili: x   Blood: x / Protein: x / Nitrite: x   Leuk Esterase: x / RBC: x / WBC x   Sq Epi: x / Non Sq Epi: x / Bacteria: x    MICROBIOLOGY:    .Blood Blood  06-09-24   No growth at 24 hours  --  --    .Blood Blood  06-09-24   No growth at 24 hours  --  --    RADIOLOGY:    6/10 MR    FINDINGS/  IMPRESSION:    Evaluation is markedly limited as the examination was prematurely   terminated secondary to patient discomfort, resulting in only 2 motion   degraded axial sequences.    Within these limits, there is no definite confluent T1 marrow signal   abnormality to suggest sequelae of acute osteomyelitis. There is a large   nonspecific joint effusion. Correlation with arthrocentesis may be of   utility to entirely exclude superinfection/septic arthritis.

## 2024-06-11 NOTE — CONSULT NOTE ADULT - ASSESSMENT
73M w/ known hx of gout who presents for acute R elbow pain. Rheumatology was consulted to evaluate for potential gout flare.     #Acute monoarthritis  #C/f cellulitis   -Clinical picture is not concerning solely for gout flare. Pt has been compliant (which he states he has been and his daughter agrees with allopurinol, he has not been taking colchicine due to lack of flare in 2 years). As well, flare for him typically occurs in his toes.   -Exam is concerning for overlying cellulitis with erythema that extends past the joint space   -Pt could have an underlying gout flare, however now pt is reporting worsening pain and ROM s/p arthrocentesis in the ED. Also denied improvement with scheduled IV toradol   -With c/f overlying cellulitis and worsening pain/ROM, would need re-evaluation for septic joint as there could have been bacterial seeding during the procedure.   -ID consulted, recommending IV vancomycin for cellulitis     Recommendations  -Please re-consult ortho to evaluate for potential septic joint s/p arthrocentesis in setting of cellulitis   -Would not recommend steroids at this time w/ c/f active infection   -C/w home dose allopurinol 300mg daily   -Consider NSAID for pain management     Discussed with Dr. Chely Lozada MD   PGY-4  Reachable on TEAMS  Pager [phone]  Rheumatology Fellow  73M w/ known hx of gout who presents for acute R elbow pain. Rheumatology was consulted to evaluate for potential gout flare.     #Acute monoarthritis  #C/f cellulitis   possible gout along with exam is concerning for concurrent overlying cellulitis with erythema that extends past the joint space and we are concerened about SEPTIC JOINT   -Pt could have an underlying gout flare, however now pt is reporting worsening pain and ROM s/p arthrocentesis in the ED. Also denied improvement with scheduled IV toradol   -With c/f overlying cellulitis and worsening pain/ROM,needs re-evaluation for septic joint as there could have been bacterial seeding during the procedure.   -ID consulted, recommending IV vancomycin for cellulitis     Recommendations  -Please re-consult ortho to evaluate for potential septic joint s/p arthrocentesis in setting of cellulitis   -Would not recommend steroids at this time w/ c/f active infection   -C/w home dose allopurinol 300mg daily   -Consider NSAID for pain management     Discussed with Dr. Chely Lozada MD   PGY-4  Reachable on TEAMS  Pager [phone]  Rheumatology Fellow

## 2024-06-11 NOTE — PHYSICAL THERAPY INITIAL EVALUATION ADULT - PERTINENT HX OF CURRENT PROBLEM, REHAB EVAL
73yMale w/ R elbow pain s/p 3 dry arthrocentesis performed by ED. ESR pending CRP 91. Low suspicion for septic arthritis.   Ddx includes olecranon bursitis vs. cellulitis vs. gout vs. pseudogout vs. other inflammatory arthropathy with gout being the most likely given previous history and the fact that patient has self discontinued taking allopurinol. WBAT RUE, ACE wrap PRN. No acute ortho intervention. X ray elbow R - No acute displaced fracture or dislocation. Soft tissue swelling and small to moderate elbow joint effusion,   nonspecific.

## 2024-06-12 LAB
ANION GAP SERPL CALC-SCNC: 13 MMOL/L — SIGNIFICANT CHANGE UP (ref 5–17)
APTT BLD: 31.4 SEC — SIGNIFICANT CHANGE UP (ref 24.5–35.6)
B PERT IGG+IGM PNL SER: ABNORMAL
BLD GP AB SCN SERPL QL: NEGATIVE — SIGNIFICANT CHANGE UP
BUN SERPL-MCNC: 12 MG/DL — SIGNIFICANT CHANGE UP (ref 7–23)
CALCIUM SERPL-MCNC: 9 MG/DL — SIGNIFICANT CHANGE UP (ref 8.4–10.5)
CHLORIDE SERPL-SCNC: 101 MMOL/L — SIGNIFICANT CHANGE UP (ref 96–108)
CO2 SERPL-SCNC: 26 MMOL/L — SIGNIFICANT CHANGE UP (ref 22–31)
COLOR FLD: ABNORMAL
CREAT SERPL-MCNC: 0.83 MG/DL — SIGNIFICANT CHANGE UP (ref 0.5–1.3)
EGFR: 92 ML/MIN/1.73M2 — SIGNIFICANT CHANGE UP
FLUID INTAKE SUBSTANCE CLASS: SIGNIFICANT CHANGE UP
GLUCOSE SERPL-MCNC: 111 MG/DL — HIGH (ref 70–99)
GRAM STN FLD: SIGNIFICANT CHANGE UP
HCT VFR BLD CALC: 41.6 % — SIGNIFICANT CHANGE UP (ref 39–50)
HCT VFR BLD CALC: 42.4 % — SIGNIFICANT CHANGE UP (ref 39–50)
HGB BLD-MCNC: 13.4 G/DL — SIGNIFICANT CHANGE UP (ref 13–17)
HGB BLD-MCNC: 13.8 G/DL — SIGNIFICANT CHANGE UP (ref 13–17)
INR BLD: 1.14 RATIO — SIGNIFICANT CHANGE UP (ref 0.85–1.18)
JOINT PATHOGENS PNL SNV NAA+NON-PROBE: SIGNIFICANT CHANGE UP
JOINT PCR SOURCE: SIGNIFICANT CHANGE UP
LYMPHOCYTES # FLD: 2 % — SIGNIFICANT CHANGE UP
MCHC RBC-ENTMCNC: 29.8 PG — SIGNIFICANT CHANGE UP (ref 27–34)
MCHC RBC-ENTMCNC: 29.8 PG — SIGNIFICANT CHANGE UP (ref 27–34)
MCHC RBC-ENTMCNC: 32.2 GM/DL — SIGNIFICANT CHANGE UP (ref 32–36)
MCHC RBC-ENTMCNC: 32.5 GM/DL — SIGNIFICANT CHANGE UP (ref 32–36)
MCV RBC AUTO: 91.6 FL — SIGNIFICANT CHANGE UP (ref 80–100)
MCV RBC AUTO: 92.4 FL — SIGNIFICANT CHANGE UP (ref 80–100)
MONOS+MACROS # FLD: 13 % — SIGNIFICANT CHANGE UP
NEUTROPHILS-BODY FLUID: 85 % — SIGNIFICANT CHANGE UP
NRBC # BLD: 0 /100 WBCS — SIGNIFICANT CHANGE UP (ref 0–0)
NRBC # BLD: 0 /100 WBCS — SIGNIFICANT CHANGE UP (ref 0–0)
PLATELET # BLD AUTO: 213 K/UL — SIGNIFICANT CHANGE UP (ref 150–400)
PLATELET # BLD AUTO: 214 K/UL — SIGNIFICANT CHANGE UP (ref 150–400)
POTASSIUM SERPL-MCNC: 3.4 MMOL/L — LOW (ref 3.5–5.3)
POTASSIUM SERPL-SCNC: 3.4 MMOL/L — LOW (ref 3.5–5.3)
PROTHROM AB SERPL-ACNC: 11.9 SEC — SIGNIFICANT CHANGE UP (ref 9.5–13)
RBC # BLD: 4.5 M/UL — SIGNIFICANT CHANGE UP (ref 4.2–5.8)
RBC # BLD: 4.63 M/UL — SIGNIFICANT CHANGE UP (ref 4.2–5.8)
RBC # FLD: 13 % — SIGNIFICANT CHANGE UP (ref 10.3–14.5)
RBC # FLD: 13.1 % — SIGNIFICANT CHANGE UP (ref 10.3–14.5)
RCV VOL RI: 6700 CELLS/UL — HIGH (ref 0–5)
RH IG SCN BLD-IMP: POSITIVE — SIGNIFICANT CHANGE UP
SODIUM SERPL-SCNC: 140 MMOL/L — SIGNIFICANT CHANGE UP (ref 135–145)
SPECIMEN SOURCE: SIGNIFICANT CHANGE UP
SYNOVIAL CRYSTALS CLARITY: ABNORMAL
SYNOVIAL CRYSTALS COLOR: ABNORMAL
SYNOVIAL CRYSTALS ID: ABNORMAL
SYNOVIAL CRYSTALS TUBE: SIGNIFICANT CHANGE UP
TOTAL NUCLEATED CELL COUNT, BODY FLUID: HIGH CELLS/UL (ref 0–5)
TUBE TYPE: SIGNIFICANT CHANGE UP
VANCOMYCIN TROUGH SERPL-MCNC: 8.9 UG/ML — LOW (ref 10–20)
WBC # BLD: 10.41 K/UL — SIGNIFICANT CHANGE UP (ref 3.8–10.5)
WBC # BLD: 9.94 K/UL — SIGNIFICANT CHANGE UP (ref 3.8–10.5)
WBC # FLD AUTO: 10.41 K/UL — SIGNIFICANT CHANGE UP (ref 3.8–10.5)
WBC # FLD AUTO: 9.94 K/UL — SIGNIFICANT CHANGE UP (ref 3.8–10.5)

## 2024-06-12 PROCEDURE — 99233 SBSQ HOSP IP/OBS HIGH 50: CPT

## 2024-06-12 PROCEDURE — 99232 SBSQ HOSP IP/OBS MODERATE 35: CPT

## 2024-06-12 PROCEDURE — 70491 CT SOFT TISSUE NECK W/DYE: CPT | Mod: 26

## 2024-06-12 RX ORDER — POTASSIUM CHLORIDE 20 MEQ
40 PACKET (EA) ORAL ONCE
Refills: 0 | Status: COMPLETED | OUTPATIENT
Start: 2024-06-12 | End: 2024-06-12

## 2024-06-12 RX ORDER — VANCOMYCIN HCL 1 G
VIAL (EA) INTRAVENOUS
Refills: 0 | Status: DISCONTINUED | OUTPATIENT
Start: 2024-06-12 | End: 2024-06-13

## 2024-06-12 RX ORDER — LACTULOSE 10 G/15ML
15 SOLUTION ORAL ONCE
Refills: 0 | Status: COMPLETED | OUTPATIENT
Start: 2024-06-12 | End: 2024-06-12

## 2024-06-12 RX ORDER — IBUPROFEN 200 MG
400 TABLET ORAL EVERY 6 HOURS
Refills: 0 | Status: DISCONTINUED | OUTPATIENT
Start: 2024-06-12 | End: 2024-06-14

## 2024-06-12 RX ORDER — VANCOMYCIN HCL 1 G
1000 VIAL (EA) INTRAVENOUS EVERY 12 HOURS
Refills: 0 | Status: DISCONTINUED | OUTPATIENT
Start: 2024-06-12 | End: 2024-06-13

## 2024-06-12 RX ORDER — METHOCARBAMOL 500 MG/1
500 TABLET, FILM COATED ORAL
Refills: 0 | Status: DISCONTINUED | OUTPATIENT
Start: 2024-06-12 | End: 2024-06-14

## 2024-06-12 RX ORDER — VANCOMYCIN HCL 1 G
1000 VIAL (EA) INTRAVENOUS ONCE
Refills: 0 | Status: COMPLETED | OUTPATIENT
Start: 2024-06-12 | End: 2024-06-12

## 2024-06-12 RX ADMIN — ENOXAPARIN SODIUM 40 MILLIGRAM(S): 100 INJECTION SUBCUTANEOUS at 06:03

## 2024-06-12 RX ADMIN — ATORVASTATIN CALCIUM 20 MILLIGRAM(S): 80 TABLET, FILM COATED ORAL at 21:17

## 2024-06-12 RX ADMIN — Medication 300 MILLIGRAM(S): at 11:22

## 2024-06-12 RX ADMIN — Medication 650 MILLIGRAM(S): at 18:00

## 2024-06-12 RX ADMIN — Medication 1 SPRAY(S): at 11:21

## 2024-06-12 RX ADMIN — Medication 1 TABLET(S): at 06:07

## 2024-06-12 RX ADMIN — Medication 250 MILLIGRAM(S): at 17:58

## 2024-06-12 RX ADMIN — Medication 20 MILLIGRAM(S): at 11:21

## 2024-06-12 RX ADMIN — TRAMADOL HYDROCHLORIDE 50 MILLIGRAM(S): 50 TABLET ORAL at 22:04

## 2024-06-12 RX ADMIN — METHOCARBAMOL 500 MILLIGRAM(S): 500 TABLET, FILM COATED ORAL at 11:21

## 2024-06-12 RX ADMIN — Medication 400 MILLIGRAM(S): at 20:29

## 2024-06-12 RX ADMIN — TRAMADOL HYDROCHLORIDE 50 MILLIGRAM(S): 50 TABLET ORAL at 21:23

## 2024-06-12 RX ADMIN — Medication 250 MILLIGRAM(S): at 06:02

## 2024-06-12 RX ADMIN — LACTULOSE 15 GRAM(S): 10 SOLUTION ORAL at 17:59

## 2024-06-12 RX ADMIN — PANTOPRAZOLE SODIUM 40 MILLIGRAM(S): 20 TABLET, DELAYED RELEASE ORAL at 06:04

## 2024-06-12 RX ADMIN — AMLODIPINE BESYLATE 5 MILLIGRAM(S): 2.5 TABLET ORAL at 06:04

## 2024-06-12 RX ADMIN — Medication 4 GRAM(S): at 17:59

## 2024-06-12 RX ADMIN — Medication 0.6 MILLIGRAM(S): at 11:21

## 2024-06-12 RX ADMIN — Medication 20 MILLIGRAM(S): at 06:04

## 2024-06-12 RX ADMIN — Medication 400 MILLIGRAM(S): at 19:29

## 2024-06-12 RX ADMIN — ZOLPIDEM TARTRATE 5 MILLIGRAM(S): 10 TABLET ORAL at 21:16

## 2024-06-12 RX ADMIN — LORATADINE 10 MILLIGRAM(S): 10 TABLET ORAL at 11:21

## 2024-06-12 RX ADMIN — Medication 250 MILLIGRAM(S): at 14:03

## 2024-06-12 RX ADMIN — Medication 150 MILLIGRAM(S): at 06:07

## 2024-06-12 RX ADMIN — Medication 150 MILLIGRAM(S): at 18:01

## 2024-06-12 RX ADMIN — Medication 1 TABLET(S): at 17:59

## 2024-06-12 RX ADMIN — Medication 40 MILLIEQUIVALENT(S): at 09:07

## 2024-06-12 RX ADMIN — SENNA PLUS 2 TABLET(S): 8.6 TABLET ORAL at 21:17

## 2024-06-12 NOTE — PROGRESS NOTE ADULT - SUBJECTIVE AND OBJECTIVE BOX
Patient is a 73y old  Male who presents with a chief complaint of Gout Flare (11 Jun 2024 14:30)      SUBJECTIVE / OVERNIGHT EVENTS: pt still with pain in right elbow, also with pain on movement of neck, angelic cp, sob    MEDICATIONS  (STANDING):  allopurinol 300 milliGRAM(s) Oral daily  amLODIPine   Tablet 5 milliGRAM(s) Oral daily  atorvastatin 20 milliGRAM(s) Oral at bedtime  colchicine 0.6 milliGRAM(s) Oral daily  dicyclomine 20 milliGRAM(s) Oral daily  diphenoxylate/atropine 1 Tablet(s) Oral two times a day  enoxaparin Injectable 40 milliGRAM(s) SubCutaneous every 24 hours  lactulose Syrup 15 Gram(s) Oral once  loratadine 10 milliGRAM(s) Oral daily  omega-3-Acid Ethyl Esters 4 Gram(s) Oral daily  pantoprazole    Tablet 40 milliGRAM(s) Oral before breakfast  pregabalin 150 milliGRAM(s) Oral two times a day  senna 2 Tablet(s) Oral at bedtime  sodium chloride 0.65% Nasal 1 Spray(s) Both Nostrils daily  torsemide 20 milliGRAM(s) Oral daily  vancomycin  IVPB      vancomycin  IVPB 1000 milliGRAM(s) IV Intermittent every 12 hours  vancomycin  IVPB 1000 milliGRAM(s) IV Intermittent once    MEDICATIONS  (PRN):  acetaminophen     Tablet .. 650 milliGRAM(s) Oral every 6 hours PRN Temp greater or equal to 38C (100.4F), Mild Pain (1 - 3)  methocarbamol 500 milliGRAM(s) Oral two times a day PRN Muscle Spasm  traMADol 50 milliGRAM(s) Oral every 6 hours PRN for pain  zolpidem 5 milliGRAM(s) Oral at bedtime PRN Insomnia  zolpidem 5 milliGRAM(s) Oral at bedtime PRN Insomnia        CAPILLARY BLOOD GLUCOSE        I&O's Summary    11 Jun 2024 07:01  -  12 Jun 2024 07:00  --------------------------------------------------------  IN: 360 mL / OUT: 0 mL / NET: 360 mL        PHYSICAL EXAM:  GENERAL: NAD, well-developed  HEAD:  Atraumatic, Normocephalic  EYES: conjunctiva and sclera clear  NECK: limited range of motion in all directions   CHEST/LUNG: Clear to auscultation bilaterally; No wheeze  HEART: Regular rate and rhythm; S1S2  ABDOMEN: Soft, Nontender, Nondistended; Bowel sounds present  EXTREMITIES:  2+ Peripheral Pulses, chronic stasis changes LE bl, +erythema over right elbow, warm, has good rom   PSYCH: AAOx3    LABS:                        13.4   10.41 )-----------( 213      ( 12 Jun 2024 07:15 )             41.6     06-12    140  |  101  |  12  ----------------------------<  111<H>  3.4<L>   |  26  |  0.83    Ca    9.0      12 Jun 2024 07:15            Urinalysis Basic - ( 12 Jun 2024 07:15 )    Color: x / Appearance: x / SG: x / pH: x  Gluc: 111 mg/dL / Ketone: x  / Bili: x / Urobili: x   Blood: x / Protein: x / Nitrite: x   Leuk Esterase: x / RBC: x / WBC x   Sq Epi: x / Non Sq Epi: x / Bacteria: x        RADIOLOGY & ADDITIONAL TESTS:    Imaging Personally Reviewed:    Consultant(s) Notes Reviewed:      Care Discussed with Consultants/Other Providers: ID

## 2024-06-12 NOTE — CHART NOTE - NSCHARTNOTEFT_GEN_A_CORE
Rheumatology and ID Teams concerned for Septic elbow joint  We have requested the Ortho Consult team to return to see the patient again as his clinical picture changed from the time they met him days ago.  We have contacted them directly, awaiting their assessment.

## 2024-06-12 NOTE — PROGRESS NOTE ADULT - SUBJECTIVE AND OBJECTIVE BOX
73y R Hand Dominant. Male with right elbow pain and hx of gout.    PMH:  Hypertension    GERD (gastroesophageal reflux disease)    Insomnia    Fall    TIA (transient ischemic attack)    Aneurysm    Dizziness    Gout    Measles    Mumps    Nutcracker esophagus    Back pain    SVT (supraventricular tachycardia)    TIA (transient ischemic attack)    OA (osteoarthritis)    Nutcracker esophagus    Vertigo    Tinnitus      PSH:  S/P knee replacement    S/P lumbar and lumbosacral fusion by anterior technique    S/P spinal fusion    S/P spinal fusion    H/O cardiac catheterization    History of appendectomy      AH:  penicillin (Unknown)    Meds: See med rec    T(C): 36.8 (06-12-24 @ 04:46)  HR: 59 (06-12-24 @ 04:46)  BP: 161/95 (06-12-24 @ 04:46)  RR: 18 (06-12-24 @ 04:46)  SpO2: 95% (06-12-24 @ 04:46)  Wt(kg): --    PE :  Gen: NAD, A/Ox3, Following commands    R/L UE:  Skin intact,  visible deformity of wrist, + soft tissue swelling, no ecchymosis  Decreased ROM of Wrist 2/2 pain  Normal Elbow/Shoulder ROM w/o pain  + TTP over distal radius  No Snuff box TTP  + Rad Pulse   SILT C5-T1  +AIN/PIN/Ulnar/Radial/Musc/Median  compartments soft and compressible    Secondary Survey:   No TTP over bony prominences, SILT, palpable pulses, full/painless A/PROM, compartments soft. No TTP over spinous processes or paraspinal muscles at C/T/L spine. No palpable step off. No other injuries or complaints. Able to SLR BL. Negative logroll/heelstrike BL. Ambulating w/o assistance/pain.    Imaging:  XRay R/L Wrist  3 views of R/L Wrist demonstrates R/L distal radius fracture, intra/extra-articular w/ posterior/anterior displacement of carpus/hand in relation to forearm. No other fx/dislocations noted.     Procedure Note:  After verbal consent obtained, ~ _____ cc of 1% Lidocaine injected into area around DR/Ulna as hematoma block. UE hung by fingers and reduction maneuver performed. A well padded sugartong splint was applied to Forearm/Wrist and mold held. MO XR obtained which show improved alignment of Fracture. Post reduction NV exam unchanged. Pt tolerated procedure well.    A/P:   73yMale s/p Mech Fall w/ R/L Distal Radius Fracture s/p closed reduction and splinting.    -Pt advised to remove all jewelry from affected limb.  -Pain control  -Strict Ice/Elevation to help with swelling  -NWB R/L UE with splint and sling  -Keep splint clean/dry/intact;  -Encourage active finger motion to help with swelling  -Pt aware of possible need for surgical intervention of distal radius fracture. Will FU as outpatient    -Recommend Ca & Vit D supplementation  -Recommend DEXA scan/Osteoporosis workup outpatient and treatment as needed  -Recommend FU w/ outpatient endocrinologist     -Pt made aware of signs and symptoms of compartment syndrome and acute carpal tunnel syndrome. Aware of need to return to ED if symptoms develop.  -Recommend follow up outpatient w/  _____ in 2-3 days. Call office to schedule appointment.  -All Patient's and Family Member's questions answered. Patient/family understand and agree w/ plan.  -Will discuss w/ attending and advise if plan changes.*****  -Imaging and clinical presentation discussed w/  ______ who is aware and agrees w/ above plan.******    .sig  .pv/.ht   73y R Hand Dominant. Male with right elbow pain and hx of gout. Patient originally seen by orthopedics on 6/9 with dry aspirations. ROM at that time ROM 20-70 deg and similar today though flexion improved to 100. ESR/CRP were elevated 41/91, normal wbc, and afebrile. MR was suggested and obtained that showed joint effusion. Rheum and ID saw and could not rule out septic arthritis and desired reevaluation. Patient started on vanc 6/11    PMH:  Hypertension    GERD (gastroesophageal reflux disease)    Insomnia    Fall    TIA (transient ischemic attack)    Aneurysm    Dizziness    Gout    Measles    Mumps    Nutcracker esophagus    Back pain    SVT (supraventricular tachycardia)    TIA (transient ischemic attack)    OA (osteoarthritis)    Nutcracker esophagus    Vertigo    Tinnitus      PSH:  S/P knee replacement    S/P lumbar and lumbosacral fusion by anterior technique    S/P spinal fusion    S/P spinal fusion    H/O cardiac catheterization    History of appendectomy      AH:  penicillin (Unknown)    Meds: See med rec    T(C): 36.8 (06-12-24 @ 04:46)  HR: 59 (06-12-24 @ 04:46)  BP: 161/95 (06-12-24 @ 04:46)  RR: 18 (06-12-24 @ 04:46)  SpO2: 95% (06-12-24 @ 04:46)  Wt(kg): --    PE :  Gen: NAD, A/Ox3, Following commands    RUE:  Skin intact, +effusion. no notable erythema  +TTP over olecranon, lateral>medial epicondyle,   mild warmth to touc  R elbow active ROM  deg, limited 2/2 pain at end rom  no pain with micromotion  Motor: Musc/Med/Rad/AIN/PIN/U intact  Sensory: Musc/Med/Rad/U SILT  +Rad pulse,      Procedure Note:  Procedure:  The benefits and risks of joint aspiration were explained to the patient, whom agreed to proceed with aspiration. Under aspetic conditions, 5cc of blood tinged straw colored fluid was removed from the affected joint. This fluid was distributed to a sterile urine cup for gram stain and cell culture, a lavender top laboratory tube for cell count, and a red top laboratory tube for crystal analysis. The patient tolerated the procedure well and there were no complications. Patient was neurovascularly intact after the procedure.      A/P:   73yMale s/p right elbow pain r/o septic arthritis     -5cc sent for pcr, gs, culture, cyrstal, cell count  -keep npo until results, IVF  -preop labs cbc, coags, t&s, bmp  -Medical optimization documentation   -WBAT  -if cell count/pcr neg then would pursue gout workup and no further orthopedic intervention   -if cc and pcr positive will plan for I&D today  -All Patient's and Family Member's questions answered. Patient/family understand and agree w/ plan  -Imaging and clinical presentation discussed w/ Dr. Haskins who is aware and agrees w/ above plan.******

## 2024-06-12 NOTE — PROGRESS NOTE ADULT - SUBJECTIVE AND OBJECTIVE BOX
CC: F/U for R elbow pain    Saw/spoke to patient. R elbow appears improved today.    Allergies  sulfa drugs (Rash)  penicillin (Unknown)    ANTIMICROBIALS:  vancomycin  IVPB 1000 every 12 hours  vancomycin  IVPB      PE:    Vital Signs Last 24 Hrs  T(C): 36.6 (12 Jun 2024 13:10), Max: 36.9 (11 Jun 2024 20:55)  T(F): 97.9 (12 Jun 2024 13:10), Max: 98.4 (11 Jun 2024 20:55)  HR: 75 (12 Jun 2024 13:10) (59 - 75)  BP: 153/81 (12 Jun 2024 13:10) (153/81 - 161/95)  BP(mean): 9 (12 Jun 2024 04:46) (9 - 9)  RR: 18 (12 Jun 2024 13:10) (18 - 18)  SpO2: 93% (12 Jun 2024 13:10) (93% - 95%)    Gen: AOx3, NAD, non-toxic  CV: Nontachycardic  Resp: Breathing comfortably, RA  Abd: Soft, nontender  IV/Skin: No thrombophlebitis    LABS:                        13.4   10.41 )-----------( 213      ( 12 Jun 2024 07:15 )             41.6     06-12    140  |  101  |  12  ----------------------------<  111<H>  3.4<L>   |  26  |  0.83    Ca    9.0      12 Jun 2024 07:15    Urinalysis Basic - ( 12 Jun 2024 07:15 )    Color: x / Appearance: x / SG: x / pH: x  Gluc: 111 mg/dL / Ketone: x  / Bili: x / Urobili: x   Blood: x / Protein: x / Nitrite: x   Leuk Esterase: x / RBC: x / WBC x   Sq Epi: x / Non Sq Epi: x / Bacteria: x    MICROBIOLOGY:  Vancomycin Level, Trough: 8.9 ug/mL (06-12-24 @ 12:23)    .Blood Blood  06-09-24   No growth at 48 Hours  --  --    .Blood Blood  06-09-24   No growth at 48 Hours  --  --    RADIOLOGY:    6/12 CT    IMPRESSION: No cervical mass or cervical lymphadenopathy.

## 2024-06-12 NOTE — PROGRESS NOTE ADULT - ASSESSMENT
72 yo M AAA, SVT, nutcracker esophagus, with elbow pain  Leukocytosis, no fever  Here with R sided elbow pain  Prior episodes of gout, has not been on allourinol  Limited MR with no signs OM  Unclear diagnosis at this point, on treatment for septic joint vs bursistis, possible underlying gout?  Today, arthrocentesis performed, crystals present, further studies pending  Overall, Wound infection, elbow pain  - Vanco 1g q 12 (monitor levels)  - Low threshold to add Ceftriaxone if not improving  - F/U Rheumatology/Ortho  - F/U arthrocentesis studies  - Trend WBC to normal  - Monitor site for improvement/worsening    Wilfred Raymond MD  Contact on TEAMS messaging from 9am - 5pm  From 5pm-9am, on weekends, or if no response call [phone]

## 2024-06-12 NOTE — CHART NOTE - NSCHARTNOTEFT_GEN_A_CORE
Crystals positive MSU  Negative cell count (10,000)  No orthopedic intervention indicated  Rheumatology follow up and medical management indicated  Would not recommend antibiotics at this time      Findings discussed with dr. ceballos who is in agreement with above

## 2024-06-13 LAB
ADD ON TEST-SPECIMEN IN LAB: SIGNIFICANT CHANGE UP
ADD ON TEST-SPECIMEN IN LAB: SIGNIFICANT CHANGE UP
ANION GAP SERPL CALC-SCNC: 14 MMOL/L — SIGNIFICANT CHANGE UP (ref 5–17)
BUN SERPL-MCNC: 13 MG/DL — SIGNIFICANT CHANGE UP (ref 7–23)
CALCIUM SERPL-MCNC: 8.9 MG/DL — SIGNIFICANT CHANGE UP (ref 8.4–10.5)
CHLORIDE SERPL-SCNC: 99 MMOL/L — SIGNIFICANT CHANGE UP (ref 96–108)
CO2 SERPL-SCNC: 30 MMOL/L — SIGNIFICANT CHANGE UP (ref 22–31)
CREAT SERPL-MCNC: 0.92 MG/DL — SIGNIFICANT CHANGE UP (ref 0.5–1.3)
EGFR: 88 ML/MIN/1.73M2 — SIGNIFICANT CHANGE UP
GLUCOSE SERPL-MCNC: 108 MG/DL — HIGH (ref 70–99)
HCT VFR BLD CALC: 41.6 % — SIGNIFICANT CHANGE UP (ref 39–50)
HGB BLD-MCNC: 13.5 G/DL — SIGNIFICANT CHANGE UP (ref 13–17)
MAGNESIUM SERPL-MCNC: 1.9 MG/DL — SIGNIFICANT CHANGE UP (ref 1.6–2.6)
MCHC RBC-ENTMCNC: 29.5 PG — SIGNIFICANT CHANGE UP (ref 27–34)
MCHC RBC-ENTMCNC: 32.5 GM/DL — SIGNIFICANT CHANGE UP (ref 32–36)
MCV RBC AUTO: 91 FL — SIGNIFICANT CHANGE UP (ref 80–100)
NRBC # BLD: 0 /100 WBCS — SIGNIFICANT CHANGE UP (ref 0–0)
PHOSPHATE SERPL-MCNC: 2.9 MG/DL — SIGNIFICANT CHANGE UP (ref 2.5–4.5)
PLATELET # BLD AUTO: 200 K/UL — SIGNIFICANT CHANGE UP (ref 150–400)
POTASSIUM SERPL-MCNC: 3.1 MMOL/L — LOW (ref 3.5–5.3)
POTASSIUM SERPL-SCNC: 3.1 MMOL/L — LOW (ref 3.5–5.3)
RBC # BLD: 4.57 M/UL — SIGNIFICANT CHANGE UP (ref 4.2–5.8)
RBC # FLD: 12.8 % — SIGNIFICANT CHANGE UP (ref 10.3–14.5)
SODIUM SERPL-SCNC: 143 MMOL/L — SIGNIFICANT CHANGE UP (ref 135–145)
WBC # BLD: 7.84 K/UL — SIGNIFICANT CHANGE UP (ref 3.8–10.5)
WBC # FLD AUTO: 7.84 K/UL — SIGNIFICANT CHANGE UP (ref 3.8–10.5)

## 2024-06-13 PROCEDURE — 99233 SBSQ HOSP IP/OBS HIGH 50: CPT | Mod: GC

## 2024-06-13 PROCEDURE — 99233 SBSQ HOSP IP/OBS HIGH 50: CPT

## 2024-06-13 PROCEDURE — 99232 SBSQ HOSP IP/OBS MODERATE 35: CPT

## 2024-06-13 RX ORDER — CEFAZOLIN SODIUM 1 G
1000 VIAL (EA) INJECTION EVERY 8 HOURS
Refills: 0 | Status: DISCONTINUED | OUTPATIENT
Start: 2024-06-13 | End: 2024-06-14

## 2024-06-13 RX ORDER — MAGNESIUM SULFATE 500 MG/ML
1 VIAL (ML) INJECTION ONCE
Refills: 0 | Status: COMPLETED | OUTPATIENT
Start: 2024-06-13 | End: 2024-06-13

## 2024-06-13 RX ORDER — DICLOFENAC SODIUM 30 MG/G
2 GEL TOPICAL THREE TIMES A DAY
Refills: 0 | Status: DISCONTINUED | OUTPATIENT
Start: 2024-06-13 | End: 2024-06-14

## 2024-06-13 RX ORDER — POTASSIUM CHLORIDE 20 MEQ
40 PACKET (EA) ORAL EVERY 4 HOURS
Refills: 0 | Status: COMPLETED | OUTPATIENT
Start: 2024-06-13 | End: 2024-06-13

## 2024-06-13 RX ADMIN — Medication 650 MILLIGRAM(S): at 12:13

## 2024-06-13 RX ADMIN — Medication 20 MILLIGRAM(S): at 05:58

## 2024-06-13 RX ADMIN — Medication 250 MILLIGRAM(S): at 05:57

## 2024-06-13 RX ADMIN — Medication 20 MILLIGRAM(S): at 15:19

## 2024-06-13 RX ADMIN — TRAMADOL HYDROCHLORIDE 50 MILLIGRAM(S): 50 TABLET ORAL at 22:53

## 2024-06-13 RX ADMIN — Medication 1 TABLET(S): at 05:58

## 2024-06-13 RX ADMIN — Medication 300 MILLIGRAM(S): at 11:45

## 2024-06-13 RX ADMIN — Medication 40 MILLIEQUIVALENT(S): at 09:27

## 2024-06-13 RX ADMIN — ATORVASTATIN CALCIUM 20 MILLIGRAM(S): 80 TABLET, FILM COATED ORAL at 22:53

## 2024-06-13 RX ADMIN — AMLODIPINE BESYLATE 5 MILLIGRAM(S): 2.5 TABLET ORAL at 05:58

## 2024-06-13 RX ADMIN — Medication 1 SPRAY(S): at 11:44

## 2024-06-13 RX ADMIN — Medication 20 MILLIGRAM(S): at 11:45

## 2024-06-13 RX ADMIN — Medication 4 GRAM(S): at 11:45

## 2024-06-13 RX ADMIN — TRAMADOL HYDROCHLORIDE 50 MILLIGRAM(S): 50 TABLET ORAL at 10:10

## 2024-06-13 RX ADMIN — LORATADINE 10 MILLIGRAM(S): 10 TABLET ORAL at 11:45

## 2024-06-13 RX ADMIN — Medication 100 GRAM(S): at 15:20

## 2024-06-13 RX ADMIN — Medication 1 TABLET(S): at 17:55

## 2024-06-13 RX ADMIN — PANTOPRAZOLE SODIUM 40 MILLIGRAM(S): 20 TABLET, DELAYED RELEASE ORAL at 05:58

## 2024-06-13 RX ADMIN — DICLOFENAC SODIUM 2 GRAM(S): 30 GEL TOPICAL at 22:21

## 2024-06-13 RX ADMIN — ZOLPIDEM TARTRATE 5 MILLIGRAM(S): 10 TABLET ORAL at 22:53

## 2024-06-13 RX ADMIN — SENNA PLUS 2 TABLET(S): 8.6 TABLET ORAL at 22:53

## 2024-06-13 RX ADMIN — Medication 40 MILLIEQUIVALENT(S): at 11:43

## 2024-06-13 RX ADMIN — Medication 0.6 MILLIGRAM(S): at 11:44

## 2024-06-13 RX ADMIN — Medication 650 MILLIGRAM(S): at 11:43

## 2024-06-13 RX ADMIN — Medication 150 MILLIGRAM(S): at 17:55

## 2024-06-13 RX ADMIN — TRAMADOL HYDROCHLORIDE 50 MILLIGRAM(S): 50 TABLET ORAL at 23:21

## 2024-06-13 RX ADMIN — Medication 150 MILLIGRAM(S): at 05:58

## 2024-06-13 RX ADMIN — ENOXAPARIN SODIUM 40 MILLIGRAM(S): 100 INJECTION SUBCUTANEOUS at 05:57

## 2024-06-13 RX ADMIN — TRAMADOL HYDROCHLORIDE 50 MILLIGRAM(S): 50 TABLET ORAL at 09:26

## 2024-06-13 RX ADMIN — Medication 100 MILLIGRAM(S): at 17:56

## 2024-06-13 NOTE — PROGRESS NOTE ADULT - ASSESSMENT
73M w/ known hx of gout who presents for acute R elbow pain. Rheumatology was consulted to evaluate for potential gout flare.     #Acute monoarthritis  #C/f cellulitis   S/p arthrocentesis of R elbow with Ortho on 6/12, fluid studies showing urate crystals, PCR negative and WBC negative for joint infection overall c/w gout flare of right elbow. Previously noted cellulitis of skin overlying right elbow overall appears improved on antibiotics.   - On Vanco since 6/11. ID following  - R elbow ROM still limited, primarily on extension. Pain and ROM not significantly improving per patient since admission.     All recommendations preliminary until attending attestation  Recommendations  - can consider short course of steroids with slow taper given persistent symptoms of gout flare of Rt Elbow with either Solumedrol 20mg IV or Prednisone 20mg PO with slow taper over 1 - 2 weeks.   - If starting steroids, can stop NSAIDS  - C/w home dose allopurinol 300mg daily     Madi Griffin MD  Internal Medicine PGY-1  Available on TEAMS       73M w/ known hx of gout who presents for acute R elbow pain. Rheumatology was consulted to evaluate for potential gout flare.     #Acute monoarthritis  #C/f cellulitis   S/p arthrocentesis of R elbow with Ortho on 6/12, fluid studies showing urate crystals, PCR negative and WBC negative for joint infection overall c/w gout flare of right elbow. Previously noted cellulitis of skin overlying right elbow overall appears improved on antibiotics.   - On Vanco since 6/11. ID following  - R elbow ROM still limited, primarily on extension. Pain and ROM not significantly improving per patient since admission.     All recommendations preliminary until attending attestation, case to be further discussed with the attending  Recommendations  - can consider short course of steroids with slow taper given persistent symptoms of gout flare of Rt Elbow with either Solumedrol 20mg IV or Prednisone 20mg PO with slow taper over 1 - 2 weeks.   - If starting steroids, can stop NSAIDS  - C/w home dose allopurinol 300mg daily     Madi Griffin MD  Internal Medicine PGY-1  Available on TEAMS       73M w/ known hx of gout who presents for acute R elbow pain. Rheumatology was consulted to evaluate for potential gout flare.     #Acute monoarthritis  #C/f cellulitis   S/p arthrocentesis of R elbow with Ortho on 6/12, fluid studies showing urate crystals, PCR negative and WBC negative for joint infection overall c/w gout flare of right elbow. Previously noted cellulitis of skin overlying right elbow overall appears improved on antibiotics.   - On Vanco since 6/11. ID following, will transition to Ancef and d/c with Keflex   - R elbow ROM still limited, primarily on extension. Pain and ROM not significantly improving per patient since admission.     All recommendations preliminary until attending attestation, case to be further discussed with the attending  Recommendations  - Discussed with ID, okay to start low dose steroids as pt is still symptomatic. Given Pred 20mg today, will monitor response tomorrow and taper will follow   - If starting steroids, can stop NSAIDS  - C/w home dose allopurinol 300mg daily     Discussed with Dr. Chely Griffin MD  Internal Medicine PGY-1  Available on TEAMS

## 2024-06-13 NOTE — PROGRESS NOTE ADULT - SUBJECTIVE AND OBJECTIVE BOX
Rheumatology Consulting Service Progress Note  Madi Griffin MD  Internal Medicine PGY-1  Available on Teams    |:::::::::::::::::::::::::::| SUBJECTIVE |:::::::::::::::::::::::::::|  PROGRESS NOTE:   Patient is a 73y old  Male who presents with a chief complaint of Gout Flare (12 Jun 2024 13:30)    Interval Events:  - s/p arthrocentesis with Ortho on 6/12, fluid analysis c/w gout flare, negative for septic arthritis  - started on Motrin 400mg q6h PRN     SUBJECTIVE / OVERNIGHT EVENTS: No acute overnight events. Continues to have neck pain and right elbow pain. No significant improvement in elbow pain or ROM per patient this morning. Elbow pain extends up to the upper arm. Neck pain is chronic. No fevers, chills, SOB.       |:::::::::::::::::::::::::::| OBJECTIVE |:::::::::::::::::::::::::::|    MEDICATIONS  (STANDING):  allopurinol 300 milliGRAM(s) Oral daily  amLODIPine   Tablet 5 milliGRAM(s) Oral daily  atorvastatin 20 milliGRAM(s) Oral at bedtime  colchicine 0.6 milliGRAM(s) Oral daily  dicyclomine 20 milliGRAM(s) Oral daily  diphenoxylate/atropine 1 Tablet(s) Oral two times a day  enoxaparin Injectable 40 milliGRAM(s) SubCutaneous every 24 hours  loratadine 10 milliGRAM(s) Oral daily  omega-3-Acid Ethyl Esters 4 Gram(s) Oral daily  pantoprazole    Tablet 40 milliGRAM(s) Oral before breakfast  potassium chloride    Tablet ER 40 milliEquivalent(s) Oral every 4 hours  pregabalin 150 milliGRAM(s) Oral two times a day  senna 2 Tablet(s) Oral at bedtime  sodium chloride 0.65% Nasal 1 Spray(s) Both Nostrils daily  torsemide 20 milliGRAM(s) Oral daily  vancomycin  IVPB      vancomycin  IVPB 1000 milliGRAM(s) IV Intermittent every 12 hours    MEDICATIONS  (PRN):  acetaminophen     Tablet .. 650 milliGRAM(s) Oral every 6 hours PRN Temp greater or equal to 38C (100.4F), Mild Pain (1 - 3)  ibuprofen  Tablet. 400 milliGRAM(s) Oral every 6 hours PRN Moderate Pain (4 - 6)  methocarbamol 500 milliGRAM(s) Oral two times a day PRN Muscle Spasm  traMADol 50 milliGRAM(s) Oral every 6 hours PRN for pain  zolpidem 5 milliGRAM(s) Oral at bedtime PRN Insomnia  zolpidem 5 milliGRAM(s) Oral at bedtime PRN Insomnia      CAPILLARY BLOOD GLUCOSE        I&O's Summary      PHYSICAL EXAM:  Vital Signs Last 24 Hrs  T(C): 36.4 (13 Jun 2024 07:21), Max: 36.6 (12 Jun 2024 13:10)  T(F): 97.6 (13 Jun 2024 07:21), Max: 97.9 (12 Jun 2024 13:10)  HR: 65 (13 Jun 2024 07:21) (60 - 75)  BP: 163/67 (13 Jun 2024 07:21) (128/69 - 163/67)  BP(mean): --  RR: 18 (13 Jun 2024 07:21) (18 - 18)  SpO2: 95% (13 Jun 2024 07:21) (93% - 95%)    Parameters below as of 13 Jun 2024 07:21  Patient On (Oxygen Delivery Method): room air        CONSTITUTIONAL: NAD on RA.  HEENT: EOMI tracking in room, moist mucous membranes.  NECK: Supple. No JVD.  RESPIRATORY: Normal respiratory effort, Lungs CTAB  CARDIOVASCULAR: Regular rate and rhythm with normal S1 and S2. No murmurs.  ABDOMEN: Soft, non-tender, non-distended. Normoactive bowel sounds, no rebound/guarding; No hepatosplenomegaly  EXTREMITIES: 2+ peripheral pulses. No clubbing, cyanosis, or edema.  MUSCULOSKELETAL: No joint swelling in right elbow with minimal tenderness to palpation, pain worsened with extension of the elbow. Able to completely flex right elbow with limited extension of R elbow to ~140 degrees, almost able to fully extend.   SKIN: improving erythema skin overlying right elbow and upper arm. No other rashes or lesions.   NEUROLOGIC: A&Ox3. No focal deficits.    LABS:                        13.5   7.84  )-----------( 200      ( 13 Jun 2024 06:52 )             41.6     06-13    143  |  99  |  13  ----------------------------<  108<H>  3.1<L>   |  30  |  0.92    Ca    8.9      13 Jun 2024 06:52      PT/INR - ( 12 Jun 2024 16:50 )   PT: 11.9 sec;   INR: 1.14 ratio         PTT - ( 12 Jun 2024 16:50 )  PTT:31.4 sec      Urinalysis Basic - ( 13 Jun 2024 06:52 )    Color: x / Appearance: x / SG: x / pH: x  Gluc: 108 mg/dL / Ketone: x  / Bili: x / Urobili: x   Blood: x / Protein: x / Nitrite: x   Leuk Esterase: x / RBC: x / WBC x   Sq Epi: x / Non Sq Epi: x / Bacteria: x        Culture - Joint (collected 12 Jun 2024 14:02)  Source: Elbow Elbow  Gram Stain (12 Jun 2024 23:22):    Numerous polymorphonuclear leukocytes per low power field    No organisms seen per oil power field        RADIOLOGY & ADDITIONAL TESTS:  Results Reviewed:   Imaging Personally Reviewed:  Electrocardiogram Personally Reviewed:    COORDINATION OF CARE:  Care Discussed with Consultants/Other Providers [Y/N]:  Prior or Outpatient Records Reviewed [Y/N]:

## 2024-06-13 NOTE — PROGRESS NOTE ADULT - SUBJECTIVE AND OBJECTIVE BOX
CC: F/U for gout    Saw/spoke to patient. Generally well appearing. No new complaints.    Allergies  sulfa drugs (Rash)  penicillin (Unknown)    ANTIMICROBIALS:  vancomycin  IVPB    vancomycin  IVPB 1000 every 12 hours    PE:    Vital Signs Last 24 Hrs  T(C): 36.4 (13 Jun 2024 13:23), Max: 36.6 (12 Jun 2024 20:28)  T(F): 97.5 (13 Jun 2024 13:23), Max: 97.9 (12 Jun 2024 20:28)  HR: 71 (13 Jun 2024 13:23) (60 - 71)  BP: 155/67 (13 Jun 2024 13:23) (128/69 - 163/67)  RR: 18 (13 Jun 2024 13:23) (18 - 18)  SpO2: 96% (13 Jun 2024 13:23) (93% - 96%)    Gen: AOx3, NAD, non-toxic  CV: Nontachycardic  Resp: Breathing comfortably, RA  Abd: Soft, nontender  IV/Skin: No thrombophlebitis    LABS:                        13.5   7.84  )-----------( 200      ( 13 Jun 2024 06:52 )             41.6     06-13    143  |  99  |  13  ----------------------------<  108<H>  3.1<L>   |  30  |  0.92    Ca    8.9      13 Jun 2024 06:52  Phos  2.9     06-13  Mg     1.9     06-13    Urinalysis Basic - ( 13 Jun 2024 06:52 )    Color: x / Appearance: x / SG: x / pH: x  Gluc: 108 mg/dL / Ketone: x  / Bili: x / Urobili: x   Blood: x / Protein: x / Nitrite: x   Leuk Esterase: x / RBC: x / WBC x   Sq Epi: x / Non Sq Epi: x / Bacteria: x    MICROBIOLOGY:    Elbow Elbow  06-12-24   No growth  --    Numerous polymorphonuclear leukocytes per low power field  No organisms seen per oil power field    .Blood Blood  06-09-24   No growth at 72 Hours  --  --    .Blood Blood  06-09-24   No growth at 72 Hours  --  --    RADIOLOGY:    6/12 CT    IMPRESSION: No cervical mass or cervical lymphadenopathy.

## 2024-06-13 NOTE — PROGRESS NOTE ADULT - ASSESSMENT
74 yo M AAA, SVT, nutcracker esophagus, with elbow pain  Leukocytosis, no fever  Here with R sided elbow pain  Prior episodes of gout, has not been on allourinol  Limited MR with no signs OM  Unclear diagnosis at this point, on treatment for septic joint vs bursistis, possible underlying gout?  Today, arthrocentesis performed, crystals present, culture NGTD, cell count ~61124  Overall, Wound infection, elbow pain  - DC Vanco, Start Cefazolin 1g q 8 to complete 10 days (including vanco days), when DC planning can complete on Keflex 500mg q 6  - Monitor on first dose cefazolin for any signs allergy (history PCN)--if has allergy to cefazolin, do not RX Keflex  - F/U Rheumatology/Ortho  - F/U arthrocentesis studies  - Trend WBC to normal  - Monitor site for improvement/worsening    Wilfred Raymond MD  Contact on TEAMS messaging from 9am - 5pm  From 5pm-9am, on weekends, or if no response call [phone]

## 2024-06-13 NOTE — PROGRESS NOTE ADULT - ATTENDING COMMENTS
pt seen and examined by me   anticipate d/c home on Rx for joint pain - and ABX  pt aware and in agreement with ongoing plans

## 2024-06-13 NOTE — PROGRESS NOTE ADULT - SUBJECTIVE AND OBJECTIVE BOX
Patient is a 73y old  Male who presents with a chief complaint of Gout Flare (13 Jun 2024 11:32)      SUBJECTIVE / OVERNIGHT EVENTS:  pt still with some pain in right elbow, denies cp, sob, abd pain     MEDICATIONS  (STANDING):  allopurinol 300 milliGRAM(s) Oral daily  amLODIPine   Tablet 5 milliGRAM(s) Oral daily  atorvastatin 20 milliGRAM(s) Oral at bedtime  colchicine 0.6 milliGRAM(s) Oral daily  dicyclomine 20 milliGRAM(s) Oral daily  diphenoxylate/atropine 1 Tablet(s) Oral two times a day  enoxaparin Injectable 40 milliGRAM(s) SubCutaneous every 24 hours  loratadine 10 milliGRAM(s) Oral daily  omega-3-Acid Ethyl Esters 4 Gram(s) Oral daily  pantoprazole    Tablet 40 milliGRAM(s) Oral before breakfast  predniSONE   Tablet 20 milliGRAM(s) Oral daily  pregabalin 150 milliGRAM(s) Oral two times a day  senna 2 Tablet(s) Oral at bedtime  sodium chloride 0.65% Nasal 1 Spray(s) Both Nostrils daily  torsemide 20 milliGRAM(s) Oral daily  vancomycin  IVPB      vancomycin  IVPB 1000 milliGRAM(s) IV Intermittent every 12 hours    MEDICATIONS  (PRN):  acetaminophen     Tablet .. 650 milliGRAM(s) Oral every 6 hours PRN Temp greater or equal to 38C (100.4F), Mild Pain (1 - 3)  ibuprofen  Tablet. 400 milliGRAM(s) Oral every 6 hours PRN Moderate Pain (4 - 6)  methocarbamol 500 milliGRAM(s) Oral two times a day PRN Muscle Spasm  traMADol 50 milliGRAM(s) Oral every 6 hours PRN for pain  zolpidem 5 milliGRAM(s) Oral at bedtime PRN Insomnia  zolpidem 5 milliGRAM(s) Oral at bedtime PRN Insomnia        CAPILLARY BLOOD GLUCOSE        I&O's Summary      PHYSICAL EXAM:  GENERAL: NAD, well-developed  HEAD:  Atraumatic, Normocephalic  EYES: conjunctiva and sclera clear  NECK: limited range of motion in all directions   CHEST/LUNG: Clear to auscultation bilaterally; No wheeze  HEART: Regular rate and rhythm; S1S2  ABDOMEN: Soft, Nontender, Nondistended; Bowel sounds present  EXTREMITIES:  2+ Peripheral Pulses, chronic stasis changes LE bl, +erythema over right elbow, warm, decreased rom to flexion/extension   PSYCH: AAOx3    LABS:                        13.5   7.84  )-----------( 200      ( 13 Jun 2024 06:52 )             41.6     06-13    143  |  99  |  13  ----------------------------<  108<H>  3.1<L>   |  30  |  0.92    Ca    8.9      13 Jun 2024 06:52  Phos  2.9     06-13  Mg     1.9     06-13      PT/INR - ( 12 Jun 2024 16:50 )   PT: 11.9 sec;   INR: 1.14 ratio         PTT - ( 12 Jun 2024 16:50 )  PTT:31.4 sec      Urinalysis Basic - ( 13 Jun 2024 06:52 )    Color: x / Appearance: x / SG: x / pH: x  Gluc: 108 mg/dL / Ketone: x  / Bili: x / Urobili: x   Blood: x / Protein: x / Nitrite: x   Leuk Esterase: x / RBC: x / WBC x   Sq Epi: x / Non Sq Epi: x / Bacteria: x        RADIOLOGY & ADDITIONAL TESTS:    Imaging Personally Reviewed:    Consultant(s) Notes Reviewed:      Care Discussed with Consultants/Other Providers: rheum

## 2024-06-14 ENCOUNTER — TRANSCRIPTION ENCOUNTER (OUTPATIENT)
Age: 74
End: 2024-06-14

## 2024-06-14 VITALS
DIASTOLIC BLOOD PRESSURE: 78 MMHG | OXYGEN SATURATION: 94 % | TEMPERATURE: 98 F | HEART RATE: 75 BPM | SYSTOLIC BLOOD PRESSURE: 136 MMHG | RESPIRATION RATE: 18 BRPM

## 2024-06-14 LAB
ANION GAP SERPL CALC-SCNC: 13 MMOL/L — SIGNIFICANT CHANGE UP (ref 5–17)
BUN SERPL-MCNC: 18 MG/DL — SIGNIFICANT CHANGE UP (ref 7–23)
CALCIUM SERPL-MCNC: 9.1 MG/DL — SIGNIFICANT CHANGE UP (ref 8.4–10.5)
CHLORIDE SERPL-SCNC: 100 MMOL/L — SIGNIFICANT CHANGE UP (ref 96–108)
CO2 SERPL-SCNC: 26 MMOL/L — SIGNIFICANT CHANGE UP (ref 22–31)
CREAT SERPL-MCNC: 0.94 MG/DL — SIGNIFICANT CHANGE UP (ref 0.5–1.3)
CULTURE RESULTS: SIGNIFICANT CHANGE UP
CULTURE RESULTS: SIGNIFICANT CHANGE UP
EGFR: 86 ML/MIN/1.73M2 — SIGNIFICANT CHANGE UP
GLUCOSE SERPL-MCNC: 122 MG/DL — HIGH (ref 70–99)
MAGNESIUM SERPL-MCNC: 2.2 MG/DL — SIGNIFICANT CHANGE UP (ref 1.6–2.6)
POTASSIUM SERPL-MCNC: 3.9 MMOL/L — SIGNIFICANT CHANGE UP (ref 3.5–5.3)
POTASSIUM SERPL-SCNC: 3.9 MMOL/L — SIGNIFICANT CHANGE UP (ref 3.5–5.3)
SODIUM SERPL-SCNC: 139 MMOL/L — SIGNIFICANT CHANGE UP (ref 135–145)
SPECIMEN SOURCE: SIGNIFICANT CHANGE UP
SPECIMEN SOURCE: SIGNIFICANT CHANGE UP

## 2024-06-14 PROCEDURE — 84145 PROCALCITONIN (PCT): CPT

## 2024-06-14 PROCEDURE — 70491 CT SOFT TISSUE NECK W/DYE: CPT | Mod: MC

## 2024-06-14 PROCEDURE — 82435 ASSAY OF BLOOD CHLORIDE: CPT

## 2024-06-14 PROCEDURE — 87205 SMEAR GRAM STAIN: CPT

## 2024-06-14 PROCEDURE — 99232 SBSQ HOSP IP/OBS MODERATE 35: CPT

## 2024-06-14 PROCEDURE — 85652 RBC SED RATE AUTOMATED: CPT

## 2024-06-14 PROCEDURE — 97535 SELF CARE MNGMENT TRAINING: CPT

## 2024-06-14 PROCEDURE — 84132 ASSAY OF SERUM POTASSIUM: CPT

## 2024-06-14 PROCEDURE — 86901 BLOOD TYPING SEROLOGIC RH(D): CPT

## 2024-06-14 PROCEDURE — 83735 ASSAY OF MAGNESIUM: CPT

## 2024-06-14 PROCEDURE — 97110 THERAPEUTIC EXERCISES: CPT

## 2024-06-14 PROCEDURE — 80202 ASSAY OF VANCOMYCIN: CPT

## 2024-06-14 PROCEDURE — 84100 ASSAY OF PHOSPHORUS: CPT

## 2024-06-14 PROCEDURE — 87075 CULTR BACTERIA EXCEPT BLOOD: CPT

## 2024-06-14 PROCEDURE — 80053 COMPREHEN METABOLIC PANEL: CPT

## 2024-06-14 PROCEDURE — 87070 CULTURE OTHR SPECIMN AEROBIC: CPT

## 2024-06-14 PROCEDURE — 99285 EMERGENCY DEPT VISIT HI MDM: CPT | Mod: 25

## 2024-06-14 PROCEDURE — 83605 ASSAY OF LACTIC ACID: CPT

## 2024-06-14 PROCEDURE — 96376 TX/PRO/DX INJ SAME DRUG ADON: CPT

## 2024-06-14 PROCEDURE — 36415 COLL VENOUS BLD VENIPUNCTURE: CPT

## 2024-06-14 PROCEDURE — 85027 COMPLETE CBC AUTOMATED: CPT

## 2024-06-14 PROCEDURE — 85610 PROTHROMBIN TIME: CPT

## 2024-06-14 PROCEDURE — 85018 HEMOGLOBIN: CPT

## 2024-06-14 PROCEDURE — 86140 C-REACTIVE PROTEIN: CPT

## 2024-06-14 PROCEDURE — 82330 ASSAY OF CALCIUM: CPT

## 2024-06-14 PROCEDURE — 89051 BODY FLUID CELL COUNT: CPT

## 2024-06-14 PROCEDURE — 84295 ASSAY OF SERUM SODIUM: CPT

## 2024-06-14 PROCEDURE — 85025 COMPLETE CBC W/AUTO DIFF WBC: CPT

## 2024-06-14 PROCEDURE — 85730 THROMBOPLASTIN TIME PARTIAL: CPT

## 2024-06-14 PROCEDURE — 87637 SARSCOV2&INF A&B&RSV AMP PRB: CPT

## 2024-06-14 PROCEDURE — 73070 X-RAY EXAM OF ELBOW: CPT

## 2024-06-14 PROCEDURE — 85014 HEMATOCRIT: CPT

## 2024-06-14 PROCEDURE — 86850 RBC ANTIBODY SCREEN: CPT

## 2024-06-14 PROCEDURE — 86900 BLOOD TYPING SEROLOGIC ABO: CPT

## 2024-06-14 PROCEDURE — 80048 BASIC METABOLIC PNL TOTAL CA: CPT

## 2024-06-14 PROCEDURE — 82803 BLOOD GASES ANY COMBINATION: CPT

## 2024-06-14 PROCEDURE — 82947 ASSAY GLUCOSE BLOOD QUANT: CPT

## 2024-06-14 PROCEDURE — 87999 UNLISTED MICROBIOLOGY PX: CPT

## 2024-06-14 PROCEDURE — 97165 OT EVAL LOW COMPLEX 30 MIN: CPT

## 2024-06-14 PROCEDURE — 97161 PT EVAL LOW COMPLEX 20 MIN: CPT

## 2024-06-14 PROCEDURE — 96375 TX/PRO/DX INJ NEW DRUG ADDON: CPT

## 2024-06-14 PROCEDURE — 73221 MRI JOINT UPR EXTREM W/O DYE: CPT | Mod: MC

## 2024-06-14 PROCEDURE — G0378: CPT

## 2024-06-14 PROCEDURE — 87040 BLOOD CULTURE FOR BACTERIA: CPT

## 2024-06-14 PROCEDURE — 89060 EXAM SYNOVIAL FLUID CRYSTALS: CPT

## 2024-06-14 PROCEDURE — 96374 THER/PROPH/DIAG INJ IV PUSH: CPT

## 2024-06-14 PROCEDURE — 99239 HOSP IP/OBS DSCHRG MGMT >30: CPT

## 2024-06-14 RX ORDER — ZINC ACETATE DIHYDRATE 100 %
1 CRYSTALS MISCELLANEOUS
Refills: 0 | DISCHARGE

## 2024-06-14 RX ORDER — OMEGA-3 ACID ETHYL ESTERS 1 G
4 CAPSULE ORAL
Qty: 120 | Refills: 0
Start: 2024-06-14 | End: 2024-07-13

## 2024-06-14 RX ORDER — IBUPROFEN 200 MG
1 TABLET ORAL
Qty: 0 | Refills: 0 | DISCHARGE
Start: 2024-06-14

## 2024-06-14 RX ORDER — CEPHALEXIN 500 MG
1 CAPSULE ORAL
Qty: 24 | Refills: 0
Start: 2024-06-14 | End: 2024-06-19

## 2024-06-14 RX ORDER — PANTOPRAZOLE SODIUM 20 MG/1
1 TABLET, DELAYED RELEASE ORAL
Qty: 30 | Refills: 0
Start: 2024-06-14 | End: 2024-07-13

## 2024-06-14 RX ORDER — DICLOFENAC SODIUM 30 MG/G
1 GEL TOPICAL
Qty: 0 | Refills: 0 | DISCHARGE
Start: 2024-06-14

## 2024-06-14 RX ORDER — POTASSIUM GLUCONATE 2.5 MEQ
1 TABLET ORAL
Refills: 0 | DISCHARGE

## 2024-06-14 RX ORDER — METHOCARBAMOL 500 MG/1
1 TABLET, FILM COATED ORAL
Qty: 28 | Refills: 0
Start: 2024-06-14 | End: 2024-06-27

## 2024-06-14 RX ADMIN — Medication 1 SPRAY(S): at 11:23

## 2024-06-14 RX ADMIN — Medication 4 GRAM(S): at 11:23

## 2024-06-14 RX ADMIN — LORATADINE 10 MILLIGRAM(S): 10 TABLET ORAL at 11:23

## 2024-06-14 RX ADMIN — Medication 150 MILLIGRAM(S): at 06:11

## 2024-06-14 RX ADMIN — Medication 20 MILLIGRAM(S): at 06:11

## 2024-06-14 RX ADMIN — Medication 100 MILLIGRAM(S): at 02:09

## 2024-06-14 RX ADMIN — Medication 20 MILLIGRAM(S): at 06:10

## 2024-06-14 RX ADMIN — AMLODIPINE BESYLATE 5 MILLIGRAM(S): 2.5 TABLET ORAL at 06:10

## 2024-06-14 RX ADMIN — Medication 300 MILLIGRAM(S): at 11:23

## 2024-06-14 RX ADMIN — ENOXAPARIN SODIUM 40 MILLIGRAM(S): 100 INJECTION SUBCUTANEOUS at 06:12

## 2024-06-14 RX ADMIN — Medication 1 TABLET(S): at 06:11

## 2024-06-14 RX ADMIN — Medication 0.6 MILLIGRAM(S): at 11:23

## 2024-06-14 RX ADMIN — DICLOFENAC SODIUM 2 GRAM(S): 30 GEL TOPICAL at 06:14

## 2024-06-14 RX ADMIN — PANTOPRAZOLE SODIUM 40 MILLIGRAM(S): 20 TABLET, DELAYED RELEASE ORAL at 06:10

## 2024-06-14 RX ADMIN — Medication 100 MILLIGRAM(S): at 11:22

## 2024-06-14 RX ADMIN — Medication 20 MILLIGRAM(S): at 11:25

## 2024-06-14 NOTE — DISCHARGE NOTE NURSING/CASE MANAGEMENT/SOCIAL WORK - NSDCPEFALRISK_GEN_ALL_CORE
For information on Fall & Injury Prevention, visit: [email or web address] OR  [email or web address] OR  https://www.cdc.gov/steadi/patient.html

## 2024-06-14 NOTE — DISCHARGE NOTE PROVIDER - NSDCCPCAREPLAN_GEN_ALL_CORE_FT
PRINCIPAL DISCHARGE DIAGNOSIS  Diagnosis: Pain and swelling of elbow  Assessment and Plan of Treatment: Arthrocentesis of right elbow was performed on 6/9. Tap fluid studies consistent with gout + monosodium urate crystals.   Please continue prednisone taper and Keflex as prescribed      SECONDARY DISCHARGE DIAGNOSES  Diagnosis: Gout flare  Assessment and Plan of Treatment: Please continue prednisone taper and follow up with rheumatology as outpatient    Diagnosis: Neck pain  Assessment and Plan of Treatment: CT soft tissues negative for acute pathology.   Follow-up with your primary care physician within 1 week. Call for appointment.  Please bring all discharge paperwork and list of medications to all follow up appointments  Please call for follow up appointments one day after discharge

## 2024-06-14 NOTE — PROGRESS NOTE ADULT - SUBJECTIVE AND OBJECTIVE BOX
CC: F/U for Wound infection    Saw/spoke to patient. No fevers, no chills. No new complaints.    Allergies  sulfa drugs (Rash)  penicillin (Unknown)    ANTIMICROBIALS:  ceFAZolin   IVPB 1000 every 8 hours    PE:    Vital Signs Last 24 Hrs  T(C): 36.6 (14 Jun 2024 12:34), Max: 36.6 (14 Jun 2024 12:34)  T(F): 97.8 (14 Jun 2024 12:34), Max: 97.8 (14 Jun 2024 12:34)  HR: 75 (14 Jun 2024 12:34) (67 - 75)  BP: 136/78 (14 Jun 2024 12:34) (136/78 - 150/78)  RR: 18 (14 Jun 2024 12:34) (17 - 18)  SpO2: 94% (14 Jun 2024 12:34) (94% - 96%)    Gen: AOx3, NAD, non-toxic  CV: Nontachycardic  Resp: Breathing comfortably, RA  Abd: Soft, nontender  IV/Skin: No thrombophlebitis    LABS:                        13.5   7.84  )-----------( 200      ( 13 Jun 2024 06:52 )             41.6     06-14    139  |  100  |  18  ----------------------------<  122<H>  3.9   |  26  |  0.94    Ca    9.1      14 Jun 2024 06:54  Phos  2.9     06-13  Mg     2.2     06-14    Urinalysis Basic - ( 14 Jun 2024 06:54 )    Color: x / Appearance: x / SG: x / pH: x  Gluc: 122 mg/dL / Ketone: x  / Bili: x / Urobili: x   Blood: x / Protein: x / Nitrite: x   Leuk Esterase: x / RBC: x / WBC x   Sq Epi: x / Non Sq Epi: x / Bacteria: x    MICROBIOLOGY:    [facility]  06-12-24   No growth  --    Numerous polymorphonuclear leukocytes per low power field  No organisms seen per oil power field    .Blood Blood  06-09-24   No growth at 4 days  --  --    .Blood Blood  06-09-24   No growth at 4 days  --  --    RADIOLOGY:    6/12 CT    IMPRESSION: No cervical mass or cervical lymphadenopathy.

## 2024-06-14 NOTE — DISCHARGE NOTE NURSING/CASE MANAGEMENT/SOCIAL WORK - NSDCFUADDAPPT_GEN_ALL_CORE_FT
APPTS ARE READY TO BE MADE: [x] YES    Best Family or Patient Contact (if needed):    Additional Information about above appointments (if needed):    1: [facility] Rheumatology   2:   3:     Other comments or requests:

## 2024-06-14 NOTE — DISCHARGE NOTE PROVIDER - NSDCMRMEDTOKEN_GEN_ALL_CORE_FT
allopurinol 300 mg oral tablet: 1 tab(s) orally once a day  Ambien 10 mg oral tablet: 1 tab(s) orally once a day (at bedtime)  amLODIPine 5 mg oral tablet: 1 tab(s) orally once a day  ascorbic acid 100 mg oral tablet: 1 tab(s) orally once a day  atorvastatin 20 mg oral tablet: 1 tab(s) orally once a day  cholecalciferol 50 mcg (2000 intl units) oral tablet: 1 tab(s) orally once a day  cyanocobalamin 100 mcg oral tablet: 1 tab(s) orally once a day  dicyclomine 20 mg oral tablet: 1 tab(s) orally once a day  diphenoxylate-atropine 2.5 mg-0.025 mg oral tablet: 1 tab(s) orally 2 times a day  Fish Oil 1000 mg oral capsule: 1 cap(s) orally once a day  fluticasone 50 mcg/inh inhalation powder: 1 puff(s) inhaled once a day  Lyrica 150 mg oral capsule: 1 cap(s) orally 2 times a day  Mitigare 0.6 mg oral capsule: 1 cap(s) orally once a day (Brand colchicine)  potassium gluconate 550 mg oral tablet: 1 tab(s) orally once a day  Salinex 0.4% nasal spray: 1 spray(s) nasal once a day  senna (sennosides) 8.6 mg oral tablet: 1 tab(s) orally once a day  torsemide 20 mg oral tablet: 1 tab(s) orally once a day  traMADol 50 mg oral tablet: 1 tab(s) orally every 6 hours as needed for  pain pt states he takes 4/day  zinc (as acetate) 25 mg oral capsule: 1 cap(s) orally once a day  ZyrTEC 10 mg oral tablet: 1 tab(s) orally once a day   allopurinol 300 mg oral tablet: 1 tab(s) orally once a day  Ambien 10 mg oral tablet: 1 tab(s) orally once a day (at bedtime)  amLODIPine 5 mg oral tablet: 1 tab(s) orally once a day  ascorbic acid 100 mg oral tablet: 1 tab(s) orally once a day  atorvastatin 20 mg oral tablet: 1 tab(s) orally once a day  cholecalciferol 50 mcg (2000 intl units) oral tablet: 1 tab(s) orally once a day  cyanocobalamin 100 mcg oral tablet: 1 tab(s) orally once a day  diclofenac 1% topical gel: Apply topically to affected area 3 times a day as needed for  moderate pain as directed  dicyclomine 20 mg oral tablet: 1 tab(s) orally once a day  diphenoxylate-atropine 2.5 mg-0.025 mg oral tablet: 1 tab(s) orally 2 times a day  Fish Oil 1000 mg oral capsule: 1 cap(s) orally once a day  fluticasone 50 mcg/inh inhalation powder: 1 puff(s) inhaled once a day  ibuprofen 400 mg oral tablet: 1 tab(s) orally every 6 hours as needed for Moderate Pain (4 - 6) stop after 2 weeks  Lyrica 150 mg oral capsule: 1 cap(s) orally 2 times a day  methocarbamol 500 mg oral tablet: 1 tab(s) orally 2 times a day as needed for Muscle Spasm as directed  Mitigare 0.6 mg oral capsule: 1 cap(s) orally once a day (Brand colchicine)  omega-3 polyunsaturated fatty acids ethyl esters 1000 mg oral capsule: 4 cap(s) orally once a day as directed  pantoprazole 40 mg oral delayed release tablet: 1 tab(s) orally once a day (before a meal) as directed  predniSONE 20 mg oral tablet: 1 tab(s) orally once a day as directed  predniSONE 5 mg oral tablet: 3 tab(s) orally once a day 15mg x 3 days (6/16 - 6/18) - 3 tabs  then 10mg x 3days (6/19 - 6/21) - 2 tabs   than 5mg  x 3 days (6/22 - 6/24) - 1 tab  then stop  Salinex 0.4% nasal spray: 1 spray(s) nasal once a day  senna (sennosides) 8.6 mg oral tablet: 1 tab(s) orally once a day  torsemide 20 mg oral tablet: 1 tab(s) orally once a day  traMADol 50 mg oral tablet: 1 tab(s) orally every 6 hours as needed for  pain pt states he takes 4/day  ZyrTEC 10 mg oral tablet: 1 tab(s) orally once a day   allopurinol 300 mg oral tablet: 1 tab(s) orally once a day  Ambien 10 mg oral tablet: 1 tab(s) orally once a day (at bedtime)  amLODIPine 5 mg oral tablet: 1 tab(s) orally once a day  ascorbic acid 100 mg oral tablet: 1 tab(s) orally once a day  atorvastatin 20 mg oral tablet: 1 tab(s) orally once a day  cephalexin 500 mg oral tablet: 1 tab(s) orally every 6 hours Per ID as directed through 6/20  cholecalciferol 50 mcg (2000 intl units) oral tablet: 1 tab(s) orally once a day  cyanocobalamin 100 mcg oral tablet: 1 tab(s) orally once a day  diclofenac 1% topical gel: Apply topically to affected area 3 times a day as needed for  moderate pain as directed  dicyclomine 20 mg oral tablet: 1 tab(s) orally once a day  diphenoxylate-atropine 2.5 mg-0.025 mg oral tablet: 1 tab(s) orally 2 times a day  Fish Oil 1000 mg oral capsule: 1 cap(s) orally once a day  fluticasone 50 mcg/inh inhalation powder: 1 puff(s) inhaled once a day  ibuprofen 400 mg oral tablet: 1 tab(s) orally every 6 hours as needed for Moderate Pain (4 - 6) stop after 2 weeks  Lyrica 150 mg oral capsule: 1 cap(s) orally 2 times a day  methocarbamol 500 mg oral tablet: 1 tab(s) orally 2 times a day as needed for Muscle Spasm as directed  Mitigare 0.6 mg oral capsule: 1 cap(s) orally once a day (Brand colchicine)  omega-3 polyunsaturated fatty acids ethyl esters 1000 mg oral capsule: 4 cap(s) orally once a day as directed  pantoprazole 40 mg oral delayed release tablet: 1 tab(s) orally once a day (before a meal) as directed  predniSONE 20 mg oral tablet: 1 tab(s) orally once a day as directed  predniSONE 5 mg oral tablet: 3 tab(s) orally once a day 15mg x 3 days (6/16 - 6/18) - 3 tabs  then 10mg x 3days (6/19 - 6/21) - 2 tabs   than 5mg  x 3 days (6/22 - 6/24) - 1 tab  then stop  Salinex 0.4% nasal spray: 1 spray(s) nasal once a day  senna (sennosides) 8.6 mg oral tablet: 1 tab(s) orally once a day  torsemide 20 mg oral tablet: 1 tab(s) orally once a day  traMADol 50 mg oral tablet: 1 tab(s) orally every 6 hours as needed for  pain pt states he takes 4/day  ZyrTEC 10 mg oral tablet: 1 tab(s) orally once a day

## 2024-06-14 NOTE — CHART NOTE - NSCHARTNOTEFT_GEN_A_CORE
Started on Pred 20mg daily yesterday for gout flare, reporting significant improvement.   Plan is for discharge today.   Can taper by 5mg every 3 days:  20mg x 1 more day  15mg x 3 days  10mg x 3 days  5mg x 3 days    Rheumatology will sign off.     Discussed with Dr. Mo and primary service  Kary Lozada MD   PGY-4  [employer] on TEAMS  Pager [phone]  Rheumatology Fellow

## 2024-06-14 NOTE — PROGRESS NOTE ADULT - PROBLEM SELECTOR PLAN 5
DVT ppx: Lovenox  Diet: DASH  Dispo: Pending rheum recs    D/w friend 6/11 at bedside updated on full plan
DVT ppx: Lovenox  Diet: DASH      D/w friend 6/13 at bedside updated on full plan  DC home in am
DVT ppx: Lovenox  Diet: DASH      D/w friend 6/11 at bedside updated on full plan
DVT ppx: Lovenox  Diet: DASH      D/w friend 6/13 at bedside updated on full plan  DC home 6/14

## 2024-06-14 NOTE — DISCHARGE NOTE NURSING/CASE MANAGEMENT/SOCIAL WORK - PATIENT PORTAL LINK FT
You can access the FollowMyHealth Patient Portal offered by [facility] by registering at the following website: [email or web address]. By joining [facility]’s FollowMyHealth portal, you will also be able to view your health information using other applications (apps) compatible with our system.

## 2024-06-14 NOTE — DISCHARGE NOTE PROVIDER - CARE PROVIDER_API CALL
[facility] Rheumatology,   Phone: (   )    -  Fax: (   )    -  Follow Up Time:

## 2024-06-14 NOTE — PROGRESS NOTE ADULT - PROBLEM SELECTOR PLAN 3
- w/ hypoK  - Replete electrolytes PRN  - diarrhea resolved
- w/ hypoK  - Replete electrolytes PRN  - f/u GI PCR
DVT ppx: Lovenox  Diet: DASH  Dispo: Pending rheum recs
- w/ hypoK  - Replete electrolytes PRN  - diarrhea resolved
- w/ hypoK  - Replete electrolytes PRN  - resolved

## 2024-06-14 NOTE — PROGRESS NOTE ADULT - PROVIDER SPECIALTY LIST ADULT
Infectious Disease
Rheumatology
Infectious Disease
Infectious Disease
Orthopedics
Hospitalist

## 2024-06-14 NOTE — PROGRESS NOTE ADULT - PROBLEM SELECTOR PLAN 2
- Pt with erythema, warmth over R elbow   - Start IV vancomycin   - ID eval
- Pt with erythema, warmth over R elbow   - C/w IV vancomycin   - Ortho follow up regarding arthrocentesis   - Appreciate ID recs
- w/ hypoK  - Replete electrolytes PRN  - f/u GI PCR
- Pt with erythema, warmth over R elbow   - C/w IV vancomycin   - Pending ID follow up on duration of abx   - Appreciate ID recs
- Pt with erythema, warmth over R elbow   - S/p IV vancomycin   - Now on IV ancef   - DC on po keflex to complete total 10 day course of treatment   - Appreciate ID recs

## 2024-06-14 NOTE — DISCHARGE NOTE PROVIDER - HOSPITAL COURSE
HPI:  Pt is 73M HTN, AAA, SVT s/p ablation, gout, AAA, nutcracker esophagus pw elbow pain.    Reports worsening right elbow pain, edema, and erythema over the past week. 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 No F/C, injections. Has not taken allopurinol in about 2 years though prior flares have been in feet.     Of note does report diarrhea w/ increased watery stools. No recent abx use.     Does maintain a high meat diet w/ consumption of sugary drinks daily. Little EtOH use.    In the ED T 100.1, on 2L NC w/ WBC 12.96 and K 2.5 w/ CRP 91 and xray w/ soft tissue edema and small to mod effusion. Attempt to tap x3 by ED w/ all being dry taps. He was administered Morphine and KCl.    (09 Jun 2024 23:45)    Hospital Course:      Important Medication Changes and Reason:    Active or Pending Issues Requiring Follow-up:    Advanced Directives:   [ ] Full code  [ ] DNR  [ ] Hospice    Discharge Diagnoses:         HPI:  Pt is 73M HTN, AAA, SVT s/p ablation, gout, AAA, nutcracker esophagus pw elbow pain.    Reports worsening right elbow pain, edema, and erythema over the past week. 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 No F/C, injections. Has not taken allopurinol in about 2 years though prior flares have been in feet.     Of note does report diarrhea w/ increased watery stools. No recent abx use.     Does maintain a high meat diet w/ consumption of sugary drinks daily. Little EtOH use.    In the ED T 100.1, on 2L NC w/ WBC 12.96 and K 2.5 w/ CRP 91 and xray w/ soft tissue edema and small to mod effusion. Attempt to tap x3 by ED w/ all being dry taps. He was administered Morphine and KCl.    (09 Jun 2024 23:45)    Hospital Course: Patient was admitted for further medical management of elbow pain likely in setting of gout flare vs cellulitis.       s/p tap fluid studies consistent with gout + monosodium urate crystals   - f/u rheum on discharge.     Problem/Plan - 2:  ·  Problem: Cellulitis.   ·  Plan: - Pt with erythema, warmth over R elbow   - S/p IV vancomycin   - Now on IV ancef   - DC on po keflex to complete total 10 day course of treatment   - Appreciate ID recs.     Problem/Plan - 3:  ·  Problem: Acute diarrhea.   ·  Plan: - w/ hypoK  - Replete electrolytes PRN  - diarrhea resolved.     Problem/Plan - 4:  ·  Problem: Neck pain.   ·  Plan: - pt with limited range of motion of neck  - may be due to mattress/ bed   - CT soft tissues negative for acute pathology   - robaxin prn as muscle relaxer  - motrin and tramadol prn for pain  - resolved.           Important Medication Changes and Reason:    Active or Pending Issues Requiring Follow-up:    Advanced Directives:   [ ] Full code  [ ] DNR  [ ] Hospice    Discharge Diagnoses:         HPI:  Pt is 73M HTN, AAA, SVT s/p ablation, gout, AAA, nutcracker esophagus pw elbow pain.    Reports worsening right elbow pain, edema, and erythema over the past week. 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 No F/C, injections. Has not taken allopurinol in about 2 years though prior flares have been in feet.     Of note does report diarrhea w/ increased watery stools. No recent abx use.     Does maintain a high meat diet w/ consumption of sugary drinks daily. Little EtOH use.    In the ED T 100.1, on 2L NC w/ WBC 12.96 and K 2.5 w/ CRP 91 and xray w/ soft tissue edema and small to mod effusion. Attempt to tap x3 by ED w/ all being dry taps. He was administered Morphine and KCl.    (09 Jun 2024 23:45)    Hospital Course: Patient was admitted for further medical management of elbow pain likely in setting of gout flare vs cellulitis. Arthrocentesis of right elbow was performed on 6/9. Tap fluid studies consistent with gout + monosodium urate crystals. Rheum was consulted recommending prednisone taper as follows: 20mg x 1 more day, 15mg x 3 days, 10mg x 3 days, 5mg x 3 days. ID was consulted recommending DC Vanco, Start Cefazolin 1g q 8 to complete 10 days (including vanco days), upon DC - patient can transition to Keflex 500mg q 6hr. Patient also endorsed neck pain on this admission, CT soft tissues negative for acute pathology. Patient initiated on robaxin prn as muscle relaxer + motrin and tramadol prn for pain     Discharge/Dispo/Med rec discussed with attending  ____. Patient medically cleared for discharge ____ with outpatient follow up     Important Medication Changes and Reason:    Active or Pending Issues Requiring Follow-up:  - f/u rheum on discharge.    Advanced Directives:   [ ] Full code  [ ] DNR  [ ] Hospice    Discharge Diagnoses:         HPI:  Pt is 73M HTN, AAA, SVT s/p ablation, gout, AAA, nutcracker esophagus pw elbow pain.    Reports worsening right elbow pain, edema, and erythema over the past week. 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 No F/C, injections. Has not taken allopurinol in about 2 years though prior flares have been in feet.     Of note does report diarrhea w/ increased watery stools. No recent abx use.     Does maintain a high meat diet w/ consumption of sugary drinks daily. Little EtOH use.    In the ED T 100.1, on 2L NC w/ WBC 12.96 and K 2.5 w/ CRP 91 and xray w/ soft tissue edema and small to mod effusion. Attempt to tap x3 by ED w/ all being dry taps. He was administered Morphine and KCl.    (09 Jun 2024 23:45)    Hospital Course: Patient was admitted for further medical management of elbow pain likely in setting of gout flare vs cellulitis. Arthrocentesis of right elbow was performed on 6/9. Tap fluid studies consistent with gout + monosodium urate crystals. Rheum was consulted recommending prednisone taper as follows: 20mg x 1 more day, 15mg x 3 days, 10mg x 3 days, 5mg x 3 days. ID was consulted recommending DC Vanco, Start Cefazolin 1g q 8 to complete 10 days (including vanco days), upon DC - patient can transition to Keflex 500mg q 6hr. Patient also endorsed neck pain on this admission, CT soft tissues negative for acute pathology. Patient initiated on robaxin prn as muscle relaxer + motrin and tramadol prn for pain     Discharge/Dispo/Med rec discussed with attending Dr. Falk. Patient medically cleared for discharge with Rheumatology outpatient follow up     Important Medication Changes and Reason:    Active or Pending Issues Requiring Follow-up:  - f/u rheum on discharge.    Advanced Directives:   [X] Full code  [ ] DNR  [ ] Hospice    Discharge Diagnoses:  Gout Flare   HTN         HPI:  Pt is 73M HTN, AAA, SVT s/p ablation, gout, AAA, nutcracker esophagus pw elbow pain.    Reports worsening right elbow pain, edema, and erythema over the past week. 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 No F/C, injections. Has not taken allopurinol in about 2 years though prior flares have been in feet.     Of note does report diarrhea w/ increased watery stools. No recent abx use.     Does maintain a high meat diet w/ consumption of sugary drinks daily. Little EtOH use.    In the ED T 100.1, on 2L NC w/ WBC 12.96 and K 2.5 w/ CRP 91 and xray w/ soft tissue edema and small to mod effusion. Attempt to tap x3 by ED w/ all being dry taps. He was administered Morphine and KCl.    (09 Jun 2024 23:45)    Hospital Course: Patient was admitted for further medical management of elbow pain likely in setting of gout flare vs cellulitis. Arthrocentesis of right elbow was performed on 6/9. Tap fluid studies consistent with gout + monosodium urate crystals. Rheum was consulted recommending prednisone taper as follows: 20mg x 1 more day, 15mg x 3 days, 10mg x 3 days, 5mg x 3 days. ID was consulted recommending DC Vanco, Start Cefazolin 1g q 8 to complete 10 days (including vanco days), upon DC - patient can transition to Keflex 500mg q 6hr. Patient also endorsed neck pain on this admission, CT soft tissues negative for acute pathology. Patient initiated on robaxin prn as muscle relaxer + motrin and tramadol prn for pain     Discharge/Dispo/Med rec discussed with attending Dr. Falk. Patient medically cleared for discharge with Rheumatology outpatient follow up     Important Medication Changes and Reason: Keflex to complete 10 days q 6h as per ID through 6/20.         Active or Pending Issues Requiring Follow-up:  - f/u rheum on discharge.    Advanced Directives:   [X] Full code  [ ] DNR  [ ] Hospice    Discharge Diagnoses:  Gout Flare   HTN

## 2024-06-14 NOTE — PROGRESS NOTE ADULT - TIME BILLING
DC time 50 min
- Ordering, reviewing, and/or interpreting labs, testing, and/or imaging  - Independently obtaining a review of systems and performing a physical exam  - Reviewing prior records and where necessary, outpatient records  - Counselling and educating patient and/or family regarding interpretation of aforementioned items and plan of care

## 2024-06-14 NOTE — PROGRESS NOTE ADULT - SUBJECTIVE AND OBJECTIVE BOX
Patient is a 73y old  Male who presents with a chief complaint of Gout Flare (13 Jun 2024 13:07)      SUBJECTIVE / OVERNIGHT EVENTS: reports elbow pain and range of motion is much better, neck pain is also improved, no cp, sob, chills     MEDICATIONS  (STANDING):  allopurinol 300 milliGRAM(s) Oral daily  amLODIPine   Tablet 5 milliGRAM(s) Oral daily  atorvastatin 20 milliGRAM(s) Oral at bedtime  ceFAZolin   IVPB 1000 milliGRAM(s) IV Intermittent every 8 hours  colchicine 0.6 milliGRAM(s) Oral daily  diclofenac sodium 1% Gel 2 Gram(s) Topical three times a day  dicyclomine 20 milliGRAM(s) Oral daily  diphenoxylate/atropine 1 Tablet(s) Oral two times a day  enoxaparin Injectable 40 milliGRAM(s) SubCutaneous every 24 hours  loratadine 10 milliGRAM(s) Oral daily  omega-3-Acid Ethyl Esters 4 Gram(s) Oral daily  pantoprazole    Tablet 40 milliGRAM(s) Oral before breakfast  predniSONE   Tablet 20 milliGRAM(s) Oral daily  pregabalin 150 milliGRAM(s) Oral two times a day  senna 2 Tablet(s) Oral at bedtime  sodium chloride 0.65% Nasal 1 Spray(s) Both Nostrils daily  torsemide 20 milliGRAM(s) Oral daily    MEDICATIONS  (PRN):  acetaminophen     Tablet .. 650 milliGRAM(s) Oral every 6 hours PRN Temp greater or equal to 38C (100.4F), Mild Pain (1 - 3)  ibuprofen  Tablet. 400 milliGRAM(s) Oral every 6 hours PRN Moderate Pain (4 - 6)  methocarbamol 500 milliGRAM(s) Oral two times a day PRN Muscle Spasm  traMADol 50 milliGRAM(s) Oral every 6 hours PRN for pain  zolpidem 5 milliGRAM(s) Oral at bedtime PRN Insomnia  zolpidem 5 milliGRAM(s) Oral at bedtime PRN Insomnia        CAPILLARY BLOOD GLUCOSE        I&O's Summary    13 Jun 2024 07:01  -  14 Jun 2024 07:00  --------------------------------------------------------  IN: 550 mL / OUT: 850 mL / NET: -300 mL        PHYSICAL EXAM:  GENERAL: NAD, well-developed  HEAD:  Atraumatic, Normocephalic  EYES: conjunctiva and sclera clear  NECK: limited range of motion in all directions   CHEST/LUNG: Clear to auscultation bilaterally; No wheeze  HEART: Regular rate and rhythm; S1S2  ABDOMEN: Soft, Nontender, Nondistended; Bowel sounds present  EXTREMITIES:  2+ Peripheral Pulses, chronic stasis changes LE bl, +erythema over right elbow, warm, improved rom to flexion/extension   PSYCH: AAOx3    LABS:                        13.5   7.84  )-----------( 200      ( 13 Jun 2024 06:52 )             41.6     06-14    139  |  100  |  18  ----------------------------<  122<H>  3.9   |  26  |  0.94    Ca    9.1      14 Jun 2024 06:54  Phos  2.9     06-13  Mg     2.2     06-14      PT/INR - ( 12 Jun 2024 16:50 )   PT: 11.9 sec;   INR: 1.14 ratio         PTT - ( 12 Jun 2024 16:50 )  PTT:31.4 sec      Urinalysis Basic - ( 14 Jun 2024 06:54 )    Color: x / Appearance: x / SG: x / pH: x  Gluc: 122 mg/dL / Ketone: x  / Bili: x / Urobili: x   Blood: x / Protein: x / Nitrite: x   Leuk Esterase: x / RBC: x / WBC x   Sq Epi: x / Non Sq Epi: x / Bacteria: x        RADIOLOGY & ADDITIONAL TESTS:    Imaging Personally Reviewed:    Consultant(s) Notes Reviewed:      Care Discussed with Consultants/Other Providers: rheum, ID

## 2024-06-14 NOTE — PROGRESS NOTE ADULT - PROBLEM SELECTOR PLAN 4
- pt with limited range of motion of neck  - may be due to mattress/ bed   - check CT soft tissues
- pt with limited range of motion of neck  - may be due to mattress/ bed   - CT soft tissues negative for acute pathology   - robaxin prn as muscle relaxer  - motrin and tramadol prn for pain
- pt with limited range of motion of neck  - may be due to mattress/ bed   - CT soft tissues negative for acute pathology   - robaxin prn as muscle relaxer  - motrin and tramadol prn for pain  - resolved
- pt with limited range of motion of neck  - may be due to mattress/ bed   - CT soft tissues negative for acute pathology   - robaxin prn as muscle relaxer

## 2024-06-14 NOTE — PROGRESS NOTE ADULT - PROBLEM SELECTOR PLAN 1
- Hx of gout w/ attacks in LE  - c/w home allopurinol, colchicine  - started on ibuprofen 400mg q6   - management of cellulitis / concern for septic joint as below   - f/u rheum cs
- Hx of gout w/ attacks in LE  - WBAT. Reports compliant with meds  - Toradol 15mg q6hrs for 1 day and can consider transition to oral NSAIDs afterwards   - resume home allopurinol, colchicine  - c/w Protonix 40mg qd  - ESR/CRP elevated  - f/u rheum cs
- Hx of gout w/ attacks in LE  - c/w home allopurinol, colchicine  - discussed with rheum started on prednisone 20mg qd with slow taper on discharge  - taper by 5mg q3 days   - s/p tap fluid studies consistent with gout + monosodium urate crystals   - f/u rheum on discharge
- Hx of gout w/ attacks in LE  - c/w home allopurinol, colchicine  - started on ibuprofen 400mg q6 prn   - discussed with rheum started on prednisone 20mg qd with slow taper on discharge  - s/p tap fluid studies consistent with gout + monosodium urate crystals   - f/u rheum cs
- Hx of gout w/ attacks in LE  - WBAT. Reports compliant with meds  - Toradol 15mg q6hrs for 1 day and can consider transition to oral NSAIDs afterwards   - resume home allopurinol, colchicine  - c/w Protonix 40mg qd  - ESR/CRP elevated  - Hold abx as wbc trended down with NSAIDs  - f/u rheum cs

## 2024-06-14 NOTE — DISCHARGE NOTE PROVIDER - NSDCFUADDAPPT_GEN_ALL_CORE_FT
APPTS ARE READY TO BE MADE: [x] YES    Best Family or Patient Contact (if needed):    Additional Information about above appointments (if needed):    1:   2:   3:     Other comments or requests:    APPTS ARE READY TO BE MADE: [x] YES    Best Family or Patient Contact (if needed):    Additional Information about above appointments (if needed):    1: [facility] Rhe[facility]   2:   3:     Other comments or requests:

## 2024-06-14 NOTE — DISCHARGE NOTE PROVIDER - NSFOLLOWUPCLINICS_GEN_ALL_ED_FT
[facility]  Rheumatology  [address] [address]  Phone: [phone]  Fax:   Follow Up Time: 1 week

## 2024-06-14 NOTE — PROGRESS NOTE ADULT - ASSESSMENT
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  Today, arthrocentesis performed, crystals present, culture NGTD, cell count ~[id number]  Tolerated Cefazolin without signs allergy  Overall, Wound infection, elbow pain  - Cefazolin 1g q 8 to complete 10 days (including vanco days), when DC planning can complete on Keflex 500mg q 6  - F/U Rheumatology/Ortho  - F/U arthrocentesis studies  - Trend WBC to normal  - Monitor site for improvement/worsening    Wilfred Raymond MD  Contact on TEAMS messaging from 9am - 5pm  From 5pm-9am, on weekends, or if no response call [phone]

## 2024-06-24 ENCOUNTER — NON-APPOINTMENT (OUTPATIENT)
Age: 74
End: 2024-06-24

## 2024-06-26 LAB
CULTURE RESULTS: SIGNIFICANT CHANGE UP
SPECIMEN SOURCE: SIGNIFICANT CHANGE UP

## 2024-08-01 ENCOUNTER — APPOINTMENT (OUTPATIENT)
Dept: RHEUMATOLOGY | Facility: CLINIC | Age: 74
End: 2024-08-01

## 2024-08-23 ENCOUNTER — NON-APPOINTMENT (OUTPATIENT)
Age: 74
End: 2024-08-23

## 2024-08-23 DIAGNOSIS — C44.91 BASAL CELL CARCINOMA OF SKIN, UNSPECIFIED: ICD-10-CM

## 2024-08-23 DIAGNOSIS — L57.0 ACTINIC KERATOSIS: ICD-10-CM

## 2024-08-23 DIAGNOSIS — L30.8 OTHER SPECIFIED DERMATITIS: ICD-10-CM

## 2024-08-23 DIAGNOSIS — R23.8 OTHER SKIN CHANGES: ICD-10-CM

## 2024-08-23 DIAGNOSIS — L82.1 OTHER SEBORRHEIC KERATOSIS: ICD-10-CM

## 2024-08-23 DIAGNOSIS — L57.8 OTHER SKIN CHANGES DUE TO CHRONIC EXPOSURE TO NONIONIZING RADIATION: ICD-10-CM

## 2024-08-23 RX ORDER — CETIRIZINE HYDROCHLORIDE 10 MG/1
CAPSULE, LIQUID FILLED ORAL
Refills: 0 | Status: ACTIVE | COMMUNITY

## 2024-08-23 RX ORDER — SENNOSIDES 8.6 MG TABLETS 8.6 MG/1
8.6 TABLET ORAL
Refills: 0 | Status: ACTIVE | COMMUNITY

## 2024-10-24 ENCOUNTER — NON-APPOINTMENT (OUTPATIENT)
Age: 74
End: 2024-10-24

## 2024-10-28 ENCOUNTER — APPOINTMENT (OUTPATIENT)
Dept: ORTHOPEDIC SURGERY | Facility: CLINIC | Age: 74
End: 2024-10-28
Payer: COMMERCIAL

## 2024-10-28 VITALS — WEIGHT: 220 LBS | HEIGHT: 73 IN | BODY MASS INDEX: 29.16 KG/M2

## 2024-10-28 DIAGNOSIS — M54.6 PAIN IN THORACIC SPINE: ICD-10-CM

## 2024-10-28 DIAGNOSIS — M54.2 CERVICALGIA: ICD-10-CM

## 2024-10-28 DIAGNOSIS — M54.50 LOW BACK PAIN, UNSPECIFIED: ICD-10-CM

## 2024-10-28 DIAGNOSIS — M47.812 SPONDYLOSIS W/OUT MYELOPATHY OR RADICULOPATHY, CERVICAL REGION: ICD-10-CM

## 2024-10-28 DIAGNOSIS — M47.817 SPONDYLOSIS W/OUT MYELOPATHY OR RADICULOPATHY, LUMBOSACRAL REGION: ICD-10-CM

## 2024-10-28 PROCEDURE — 99203 OFFICE O/P NEW LOW 30 MIN: CPT

## 2024-10-28 PROCEDURE — 72082 X-RAY EXAM ENTIRE SPI 2/3 VW: CPT

## 2024-11-04 ENCOUNTER — APPOINTMENT (OUTPATIENT)
Dept: ORTHOPEDIC SURGERY | Facility: CLINIC | Age: 74
End: 2024-11-04

## 2024-11-04 VITALS
BODY MASS INDEX: 28.54 KG/M2 | SYSTOLIC BLOOD PRESSURE: 132 MMHG | HEART RATE: 52 BPM | WEIGHT: 220 LBS | HEIGHT: 73.5 IN | DIASTOLIC BLOOD PRESSURE: 89 MMHG

## 2024-11-04 DIAGNOSIS — S86.012D STRAIN OF LEFT ACHILLES TENDON, SUBSEQUENT ENCOUNTER: ICD-10-CM

## 2024-11-04 DIAGNOSIS — I99.8 OTHER DISORDER OF CIRCULATORY SYSTEM: ICD-10-CM

## 2024-11-04 DIAGNOSIS — S86.011S STRAIN OF RIGHT ACHILLES TENDON, SEQUELA: ICD-10-CM

## 2024-11-04 DIAGNOSIS — M25.571 PAIN IN RIGHT ANKLE AND JOINTS OF RIGHT FOOT: ICD-10-CM

## 2024-11-04 DIAGNOSIS — M25.572 PAIN IN RIGHT ANKLE AND JOINTS OF RIGHT FOOT: ICD-10-CM

## 2024-11-04 DIAGNOSIS — Z87.39 PERSONAL HISTORY OF OTHER DISEASES OF THE MUSCULOSKELETAL SYSTEM AND CONNECTIVE TISSUE: ICD-10-CM

## 2024-11-04 PROCEDURE — 99205 OFFICE O/P NEW HI 60 MIN: CPT

## 2024-11-04 PROCEDURE — 73610 X-RAY EXAM OF ANKLE: CPT | Mod: 50

## 2024-11-05 ENCOUNTER — APPOINTMENT (OUTPATIENT)
Dept: ORTHOPEDIC SURGERY | Facility: CLINIC | Age: 74
End: 2024-11-05

## 2024-11-12 ENCOUNTER — NON-APPOINTMENT (OUTPATIENT)
Age: 74
End: 2024-11-12

## 2024-11-12 NOTE — H&P CARDIOLOGY - SURGICAL SITE INCISION
Comment: Patient requested Acyclovir 800mg tablets not Valcyclovir. Detail Level: Simple Render Risk Assessment In Note?: no no

## 2024-11-17 PROBLEM — S86.011S: Status: ACTIVE | Noted: 2024-11-17

## 2024-11-17 PROBLEM — S86.012D ACHILLES RUPTURE, LEFT, SUBSEQUENT ENCOUNTER: Status: ACTIVE | Noted: 2024-11-17

## 2024-11-17 PROBLEM — I99.8 VASCULAR CALCIFICATION: Status: ACTIVE | Noted: 2024-11-17

## 2024-11-17 PROBLEM — Z87.39 HISTORY OF GOUT: Status: ACTIVE | Noted: 2024-11-17

## 2024-11-19 ENCOUNTER — APPOINTMENT (OUTPATIENT)
Dept: ORTHOPEDIC SURGERY | Facility: CLINIC | Age: 74
End: 2024-11-19
Payer: COMMERCIAL

## 2024-11-19 DIAGNOSIS — S86.011S STRAIN OF RIGHT ACHILLES TENDON, SEQUELA: ICD-10-CM

## 2024-11-19 DIAGNOSIS — S86.012D STRAIN OF LEFT ACHILLES TENDON, SUBSEQUENT ENCOUNTER: ICD-10-CM

## 2024-11-19 DIAGNOSIS — I99.8 OTHER DISORDER OF CIRCULATORY SYSTEM: ICD-10-CM

## 2024-11-19 PROCEDURE — 99213 OFFICE O/P EST LOW 20 MIN: CPT

## 2024-12-04 ENCOUNTER — NON-APPOINTMENT (OUTPATIENT)
Age: 74
End: 2024-12-04

## 2024-12-09 ENCOUNTER — APPOINTMENT (OUTPATIENT)
Dept: ORTHOPEDIC SURGERY | Facility: CLINIC | Age: 74
End: 2024-12-09

## 2024-12-23 ENCOUNTER — APPOINTMENT (OUTPATIENT)
Dept: ORTHOPEDIC SURGERY | Facility: CLINIC | Age: 74
End: 2024-12-23
Payer: COMMERCIAL

## 2024-12-23 DIAGNOSIS — S86.012D STRAIN OF LEFT ACHILLES TENDON, SUBSEQUENT ENCOUNTER: ICD-10-CM

## 2024-12-23 DIAGNOSIS — Z87.39 PERSONAL HISTORY OF OTHER DISEASES OF THE MUSCULOSKELETAL SYSTEM AND CONNECTIVE TISSUE: ICD-10-CM

## 2024-12-23 DIAGNOSIS — I99.8 OTHER DISORDER OF CIRCULATORY SYSTEM: ICD-10-CM

## 2024-12-23 DIAGNOSIS — S86.011S STRAIN OF RIGHT ACHILLES TENDON, SEQUELA: ICD-10-CM

## 2024-12-23 PROCEDURE — 99213 OFFICE O/P EST LOW 20 MIN: CPT

## 2025-01-28 ENCOUNTER — APPOINTMENT (OUTPATIENT)
Dept: ORTHOPEDIC SURGERY | Facility: CLINIC | Age: 75
End: 2025-01-28
Payer: COMMERCIAL

## 2025-01-28 DIAGNOSIS — S86.011S STRAIN OF RIGHT ACHILLES TENDON, SEQUELA: ICD-10-CM

## 2025-01-28 DIAGNOSIS — S86.012D STRAIN OF LEFT ACHILLES TENDON, SUBSEQUENT ENCOUNTER: ICD-10-CM

## 2025-01-28 DIAGNOSIS — I99.8 OTHER DISORDER OF CIRCULATORY SYSTEM: ICD-10-CM

## 2025-01-28 PROCEDURE — 99215 OFFICE O/P EST HI 40 MIN: CPT

## 2025-02-11 ENCOUNTER — NON-APPOINTMENT (OUTPATIENT)
Age: 75
End: 2025-02-11

## 2025-02-28 ENCOUNTER — APPOINTMENT (OUTPATIENT)
Dept: ORTHOPEDIC SURGERY | Facility: HOSPITAL | Age: 75
End: 2025-02-28

## 2025-03-13 ENCOUNTER — APPOINTMENT (OUTPATIENT)
Dept: ORTHOPEDIC SURGERY | Facility: CLINIC | Age: 75
End: 2025-03-13

## 2025-03-18 ENCOUNTER — APPOINTMENT (OUTPATIENT)
Dept: DERMATOLOGY | Facility: CLINIC | Age: 75
End: 2025-03-18

## 2025-05-13 ENCOUNTER — NON-APPOINTMENT (OUTPATIENT)
Age: 75
End: 2025-05-13

## 2025-05-14 ENCOUNTER — APPOINTMENT (OUTPATIENT)
Dept: DERMATOLOGY | Facility: CLINIC | Age: 75
End: 2025-05-14
Payer: COMMERCIAL

## 2025-05-14 DIAGNOSIS — D48.5 NEOPLASM OF UNCERTAIN BEHAVIOR OF SKIN: ICD-10-CM

## 2025-05-14 DIAGNOSIS — L57.8 OTHER SKIN CHANGES DUE TO CHRONIC EXPOSURE TO NONIONIZING RADIATION: ICD-10-CM

## 2025-05-14 DIAGNOSIS — Z85.828 PERSONAL HISTORY OF OTHER MALIGNANT NEOPLASM OF SKIN: ICD-10-CM

## 2025-05-14 DIAGNOSIS — L82.1 OTHER SEBORRHEIC KERATOSIS: ICD-10-CM

## 2025-05-14 PROCEDURE — 99203 OFFICE O/P NEW LOW 30 MIN: CPT | Mod: 25

## 2025-05-14 PROCEDURE — 99213 OFFICE O/P EST LOW 20 MIN: CPT | Mod: 25

## 2025-05-14 PROCEDURE — 11102 TANGNTL BX SKIN SINGLE LES: CPT

## 2025-05-16 ENCOUNTER — APPOINTMENT (OUTPATIENT)
Dept: MRI IMAGING | Facility: HOSPITAL | Age: 75
End: 2025-05-16

## 2025-05-20 LAB — CORE LAB BIOPSY: NORMAL

## 2025-05-21 ENCOUNTER — APPOINTMENT (OUTPATIENT)
Dept: MRI IMAGING | Facility: HOSPITAL | Age: 75
End: 2025-05-21

## 2025-05-22 ENCOUNTER — NON-APPOINTMENT (OUTPATIENT)
Age: 75
End: 2025-05-22

## 2025-06-21 ENCOUNTER — INPATIENT (INPATIENT)
Facility: HOSPITAL | Age: 75
LOS: 3 days | Discharge: HOME | DRG: 854 | End: 2025-06-25
Attending: INTERNAL MEDICINE | Admitting: INTERNAL MEDICINE
Payer: COMMERCIAL

## 2025-06-21 VITALS
HEIGHT: 71 IN | WEIGHT: 250 LBS | RESPIRATION RATE: 20 BRPM | TEMPERATURE: 102 F | HEART RATE: 108 BPM | DIASTOLIC BLOOD PRESSURE: 74 MMHG | OXYGEN SATURATION: 100 % | SYSTOLIC BLOOD PRESSURE: 130 MMHG

## 2025-06-21 DIAGNOSIS — Z90.49 ACQUIRED ABSENCE OF OTHER SPECIFIED PARTS OF DIGESTIVE TRACT: Chronic | ICD-10-CM

## 2025-06-21 DIAGNOSIS — Z98.890 OTHER SPECIFIED POSTPROCEDURAL STATES: Chronic | ICD-10-CM

## 2025-06-21 LAB
ALBUMIN SERPL ELPH-MCNC: 3.5 G/DL — SIGNIFICANT CHANGE UP (ref 3.3–5)
ALP SERPL-CCNC: 82 U/L — SIGNIFICANT CHANGE UP (ref 40–120)
ALT FLD-CCNC: 24 U/L — SIGNIFICANT CHANGE UP (ref 10–45)
ANION GAP SERPL CALC-SCNC: 9 MMOL/L — SIGNIFICANT CHANGE UP (ref 5–17)
APPEARANCE UR: CLEAR — SIGNIFICANT CHANGE UP
APTT BLD: 32 SEC — SIGNIFICANT CHANGE UP (ref 26.1–36.8)
AST SERPL-CCNC: 26 U/L — SIGNIFICANT CHANGE UP (ref 10–40)
BACTERIA # UR AUTO: ABNORMAL /HPF
BASOPHILS # BLD AUTO: 0.03 K/UL — SIGNIFICANT CHANGE UP (ref 0–0.2)
BASOPHILS NFR BLD AUTO: 0.2 % — SIGNIFICANT CHANGE UP (ref 0–2)
BILIRUB SERPL-MCNC: 0.8 MG/DL — SIGNIFICANT CHANGE UP (ref 0.2–1.2)
BILIRUB UR-MCNC: NEGATIVE — SIGNIFICANT CHANGE UP
BUN SERPL-MCNC: 15 MG/DL — SIGNIFICANT CHANGE UP (ref 7–23)
CALCIUM SERPL-MCNC: 9 MG/DL — SIGNIFICANT CHANGE UP (ref 8.4–10.5)
CHLORIDE SERPL-SCNC: 102 MMOL/L — SIGNIFICANT CHANGE UP (ref 96–108)
CO2 SERPL-SCNC: 27 MMOL/L — SIGNIFICANT CHANGE UP (ref 22–31)
COLOR SPEC: YELLOW — SIGNIFICANT CHANGE UP
CREAT SERPL-MCNC: 1.31 MG/DL — HIGH (ref 0.5–1.3)
DIFF PNL FLD: ABNORMAL
EGFR: 57 ML/MIN/1.73M2 — LOW
EGFR: 57 ML/MIN/1.73M2 — LOW
EOSINOPHIL # BLD AUTO: 0 K/UL — SIGNIFICANT CHANGE UP (ref 0–0.5)
EOSINOPHIL NFR BLD AUTO: 0 % — SIGNIFICANT CHANGE UP (ref 0–6)
EPI CELLS # UR: SIGNIFICANT CHANGE UP
FLUAV AG NPH QL: SIGNIFICANT CHANGE UP
FLUBV AG NPH QL: SIGNIFICANT CHANGE UP
GLUCOSE SERPL-MCNC: 136 MG/DL — HIGH (ref 70–99)
GLUCOSE UR QL: NEGATIVE MG/DL — SIGNIFICANT CHANGE UP
HCT VFR BLD CALC: 41.6 % — SIGNIFICANT CHANGE UP (ref 39–50)
HGB BLD-MCNC: 13.9 G/DL — SIGNIFICANT CHANGE UP (ref 13–17)
HYALINE CASTS # UR AUTO: SIGNIFICANT CHANGE UP
IMM GRANULOCYTES NFR BLD AUTO: 0.5 % — SIGNIFICANT CHANGE UP (ref 0–0.9)
INR BLD: 1.3 RATIO — HIGH (ref 0.85–1.16)
KETONES UR QL: NEGATIVE MG/DL — SIGNIFICANT CHANGE UP
LACTATE SERPL-SCNC: 1.4 MMOL/L — SIGNIFICANT CHANGE UP (ref 0.7–2)
LACTATE SERPL-SCNC: 2.1 MMOL/L — HIGH (ref 0.7–2)
LEUKOCYTE ESTERASE UR-ACNC: ABNORMAL
LYMPHOCYTES # BLD AUTO: 1.32 K/UL — SIGNIFICANT CHANGE UP (ref 1–3.3)
LYMPHOCYTES # BLD AUTO: 9.5 % — LOW (ref 13–44)
MCHC RBC-ENTMCNC: 29.7 PG — SIGNIFICANT CHANGE UP (ref 27–34)
MCHC RBC-ENTMCNC: 33.4 G/DL — SIGNIFICANT CHANGE UP (ref 32–36)
MCV RBC AUTO: 88.9 FL — SIGNIFICANT CHANGE UP (ref 80–100)
MONOCYTES # BLD AUTO: 1.19 K/UL — HIGH (ref 0–0.9)
MONOCYTES NFR BLD AUTO: 8.5 % — SIGNIFICANT CHANGE UP (ref 2–14)
NEUTROPHILS # BLD AUTO: 11.35 K/UL — HIGH (ref 1.8–7.4)
NEUTROPHILS NFR BLD AUTO: 81.3 % — HIGH (ref 43–77)
NITRITE UR-MCNC: POSITIVE
NRBC BLD AUTO-RTO: 0 /100 WBCS — SIGNIFICANT CHANGE UP (ref 0–0)
PH UR: 6 — SIGNIFICANT CHANGE UP (ref 5–8)
PLATELET # BLD AUTO: 160 K/UL — SIGNIFICANT CHANGE UP (ref 150–400)
POTASSIUM SERPL-MCNC: 3.2 MMOL/L — LOW (ref 3.5–5.3)
POTASSIUM SERPL-SCNC: 3.2 MMOL/L — LOW (ref 3.5–5.3)
PROT SERPL-MCNC: 7.2 G/DL — SIGNIFICANT CHANGE UP (ref 6–8.3)
PROT UR-MCNC: 30 MG/DL
PROTHROM AB SERPL-ACNC: 15.3 SEC — HIGH (ref 9.9–13.4)
RBC # BLD: 4.68 M/UL — SIGNIFICANT CHANGE UP (ref 4.2–5.8)
RBC # FLD: 13.2 % — SIGNIFICANT CHANGE UP (ref 10.3–14.5)
RBC CASTS # UR COMP ASSIST: 8 /HPF — HIGH (ref 0–4)
RSV RNA NPH QL NAA+NON-PROBE: SIGNIFICANT CHANGE UP
SARS-COV-2 RNA SPEC QL NAA+PROBE: SIGNIFICANT CHANGE UP
SODIUM SERPL-SCNC: 138 MMOL/L — SIGNIFICANT CHANGE UP (ref 135–145)
SOURCE RESPIRATORY: SIGNIFICANT CHANGE UP
SP GR SPEC: 1.02 — SIGNIFICANT CHANGE UP (ref 1–1.03)
TROPONIN I, HIGH SENSITIVITY RESULT: 8.7 NG/L — SIGNIFICANT CHANGE UP
UROBILINOGEN FLD QL: 1 MG/DL — SIGNIFICANT CHANGE UP (ref 0.2–1)
WBC # BLD: 13.96 K/UL — HIGH (ref 3.8–10.5)
WBC # FLD AUTO: 13.96 K/UL — HIGH (ref 3.8–10.5)
WBC UR QL: 30 /HPF — HIGH (ref 0–5)

## 2025-06-21 PROCEDURE — 93010 ELECTROCARDIOGRAM REPORT: CPT

## 2025-06-21 PROCEDURE — 99285 EMERGENCY DEPT VISIT HI MDM: CPT

## 2025-06-21 PROCEDURE — 71045 X-RAY EXAM CHEST 1 VIEW: CPT | Mod: 26

## 2025-06-21 RX ORDER — ACETAMINOPHEN 500 MG/5ML
650 LIQUID (ML) ORAL ONCE
Refills: 0 | Status: COMPLETED | OUTPATIENT
Start: 2025-06-21 | End: 2025-06-21

## 2025-06-21 RX ORDER — CEFTRIAXONE 500 MG/1
1000 INJECTION, POWDER, FOR SOLUTION INTRAMUSCULAR; INTRAVENOUS ONCE
Refills: 0 | Status: COMPLETED | OUTPATIENT
Start: 2025-06-21 | End: 2025-06-21

## 2025-06-21 RX ADMIN — Medication 2300 MILLILITER(S): at 22:20

## 2025-06-21 RX ADMIN — Medication 2300 MILLILITER(S): at 19:45

## 2025-06-21 RX ADMIN — Medication 650 MILLIGRAM(S): at 19:44

## 2025-06-21 RX ADMIN — CEFTRIAXONE 100 MILLIGRAM(S): 500 INJECTION, POWDER, FOR SOLUTION INTRAMUSCULAR; INTRAVENOUS at 23:49

## 2025-06-21 NOTE — ED ADULT TRIAGE NOTE - GLASGOW COMA SCALE: SCORE, MLM
15
I have personally performed a face to face diagnostic evaluation on this patient. I have reviewed the ACP note and agree with the history, exam and plan of care, except as noted.

## 2025-06-21 NOTE — ED PROVIDER NOTE - PHYSICAL EXAMINATION
Vitals: I have reviewed the patients vital signs  General: nontoxic appearing patient had trouble sitting up on his own and slid down in bed  HEENT: Atraumatic, normocephalic, airway patent  Eyes: EOMI, tracking appropriately pupils equal round reactive to light  Neck: no tracheal deviation there is a full range of motion however with pain  Chest/Lungs: no trauma, symmetric chest rise, speaking in complete sentences,  no resp distress clear to auscultation bilaterally  Heart: skin and extremities well perfused, regular rate and rhythm  Neuro: A+Ox3, appears non focal  MSK: no deformities  Skin: no cyanosis, no jaundice   Psych:  Normal mood and affect  Abdomen: Soft nontender nondistended

## 2025-06-21 NOTE — ED PROVIDER NOTE - OBJECTIVE STATEMENT
74-year-old male past medical history of hypercholesterolemia as well as cervical and lumbar fusion and rods presenting with 2 days of worsening neck and back pain.  Today also has a headache.  Feels very hot when he was noted to have a fever.  According to daughter in the room he has not been himself and having trouble getting up and walking.  This is unlike him.  He denies any dysuria or hematuria there is no cough.  There is little coryza.  He had some nausea with dry heaves earlier.  No known sick contacts

## 2025-06-21 NOTE — ED ADULT NURSE NOTE - CAS EDN DISCHARGE ASSESSMENT
Pt c/o right side back/flank pain that radiates to the abdomen and general groin pain x 2 weeks with dysuria. PMH HLD,DM2 Alert and oriented to person, place and time/Patient baseline mental status

## 2025-06-21 NOTE — ED PROVIDER NOTE - CLINICAL SUMMARY MEDICAL DECISION MAKING FREE TEXT BOX
74-year-old presenting to the emergency department today with fever.  There is no obvious etiology at this time.  Differential is broad and includes viral syndrome such as COVID could also include things such as meningitis urine tract infection pneumonia.  Will do broad workup including labs urinalysis chest x-ray viral swab IV fluids and Tylenol.  If workup is negative and no obvious etiology is found we will consider doing a spinal tap.  Based on the patient's weakness however I do not anticipate him being able to go home.

## 2025-06-21 NOTE — ED PROVIDER NOTE - PROGRESS NOTE DETAILS
Patient's laboratory studies showed a mildly elevated lactate of 2.1.  Repeat lactate was 1.4.  There is a white count of 13.96.  X-ray was clear.  Flu was negative.  The patient was found able to provide a urine sample which was positive for UTI.  The patient received ceftriaxone.  Being that he is still somewhat altered and not strength he will be admitted to the hospital.  He was discussed with the hospitalist who accept to her service

## 2025-06-22 DIAGNOSIS — N39.0 URINARY TRACT INFECTION, SITE NOT SPECIFIED: ICD-10-CM

## 2025-06-22 DIAGNOSIS — N20.1 CALCULUS OF URETER: ICD-10-CM

## 2025-06-22 DIAGNOSIS — R50.9 FEVER, UNSPECIFIED: ICD-10-CM

## 2025-06-22 DIAGNOSIS — A41.9 SEPSIS, UNSPECIFIED ORGANISM: ICD-10-CM

## 2025-06-22 DIAGNOSIS — N13.30 UNSPECIFIED HYDRONEPHROSIS: ICD-10-CM

## 2025-06-22 LAB
ANION GAP SERPL CALC-SCNC: 6 MMOL/L — SIGNIFICANT CHANGE UP (ref 5–17)
BASOPHILS # BLD AUTO: 0.04 K/UL — SIGNIFICANT CHANGE UP (ref 0–0.2)
BASOPHILS NFR BLD AUTO: 0.3 % — SIGNIFICANT CHANGE UP (ref 0–2)
BUN SERPL-MCNC: 16 MG/DL — SIGNIFICANT CHANGE UP (ref 7–23)
CALCIUM SERPL-MCNC: 8.2 MG/DL — LOW (ref 8.4–10.5)
CHLORIDE SERPL-SCNC: 104 MMOL/L — SIGNIFICANT CHANGE UP (ref 96–108)
CO2 SERPL-SCNC: 28 MMOL/L — SIGNIFICANT CHANGE UP (ref 22–31)
CREAT SERPL-MCNC: 1.09 MG/DL — SIGNIFICANT CHANGE UP (ref 0.5–1.3)
EGFR: 71 ML/MIN/1.73M2 — SIGNIFICANT CHANGE UP
EGFR: 71 ML/MIN/1.73M2 — SIGNIFICANT CHANGE UP
EOSINOPHIL # BLD AUTO: 0.02 K/UL — SIGNIFICANT CHANGE UP (ref 0–0.5)
EOSINOPHIL NFR BLD AUTO: 0.2 % — SIGNIFICANT CHANGE UP (ref 0–6)
GLUCOSE SERPL-MCNC: 116 MG/DL — HIGH (ref 70–99)
HCT VFR BLD CALC: 39 % — SIGNIFICANT CHANGE UP (ref 39–50)
HGB BLD-MCNC: 12.5 G/DL — LOW (ref 13–17)
IMM GRANULOCYTES NFR BLD AUTO: 0.5 % — SIGNIFICANT CHANGE UP (ref 0–0.9)
LYMPHOCYTES # BLD AUTO: 1.84 K/UL — SIGNIFICANT CHANGE UP (ref 1–3.3)
LYMPHOCYTES # BLD AUTO: 14.1 % — SIGNIFICANT CHANGE UP (ref 13–44)
MAGNESIUM SERPL-MCNC: 1.6 MG/DL — SIGNIFICANT CHANGE UP (ref 1.6–2.6)
MCHC RBC-ENTMCNC: 29.4 PG — SIGNIFICANT CHANGE UP (ref 27–34)
MCHC RBC-ENTMCNC: 32.1 G/DL — SIGNIFICANT CHANGE UP (ref 32–36)
MCV RBC AUTO: 91.8 FL — SIGNIFICANT CHANGE UP (ref 80–100)
MONOCYTES # BLD AUTO: 1.5 K/UL — HIGH (ref 0–0.9)
MONOCYTES NFR BLD AUTO: 11.5 % — SIGNIFICANT CHANGE UP (ref 2–14)
NEUTROPHILS # BLD AUTO: 9.56 K/UL — HIGH (ref 1.8–7.4)
NEUTROPHILS NFR BLD AUTO: 73.4 % — SIGNIFICANT CHANGE UP (ref 43–77)
NRBC BLD AUTO-RTO: 0 /100 WBCS — SIGNIFICANT CHANGE UP (ref 0–0)
PLATELET # BLD AUTO: 133 K/UL — LOW (ref 150–400)
POTASSIUM SERPL-MCNC: 3.4 MMOL/L — LOW (ref 3.5–5.3)
POTASSIUM SERPL-SCNC: 3.4 MMOL/L — LOW (ref 3.5–5.3)
RBC # BLD: 4.25 M/UL — SIGNIFICANT CHANGE UP (ref 4.2–5.8)
RBC # FLD: 13.2 % — SIGNIFICANT CHANGE UP (ref 10.3–14.5)
SODIUM SERPL-SCNC: 138 MMOL/L — SIGNIFICANT CHANGE UP (ref 135–145)
WBC # BLD: 13.02 K/UL — HIGH (ref 3.8–10.5)
WBC # FLD AUTO: 13.02 K/UL — HIGH (ref 3.8–10.5)

## 2025-06-22 PROCEDURE — 99223 1ST HOSP IP/OBS HIGH 75: CPT

## 2025-06-22 PROCEDURE — 52332 CYSTOSCOPY AND TREATMENT: CPT | Mod: LT

## 2025-06-22 PROCEDURE — 74176 CT ABD & PELVIS W/O CONTRAST: CPT | Mod: 26

## 2025-06-22 PROCEDURE — 99223 1ST HOSP IP/OBS HIGH 75: CPT | Mod: 25

## 2025-06-22 DEVICE — URETERAL STENT TRIA SOFT 6FR 26MM: Type: IMPLANTABLE DEVICE | Site: LEFT | Status: FUNCTIONAL

## 2025-06-22 DEVICE — GUIDEWIRE SENSOR NITINOL STRAIGHT .038" X 150CM: Type: IMPLANTABLE DEVICE | Site: LEFT | Status: FUNCTIONAL

## 2025-06-22 DEVICE — URETERAL CATH FLEXIMA OPEN END 5FR 70CM: Type: IMPLANTABLE DEVICE | Site: LEFT | Status: FUNCTIONAL

## 2025-06-22 RX ORDER — CEFTRIAXONE 500 MG/1
1000 INJECTION, POWDER, FOR SOLUTION INTRAMUSCULAR; INTRAVENOUS EVERY 24 HOURS
Refills: 0 | Status: DISCONTINUED | OUTPATIENT
Start: 2025-06-22 | End: 2025-06-22

## 2025-06-22 RX ORDER — ATORVASTATIN CALCIUM 80 MG/1
20 TABLET, FILM COATED ORAL AT BEDTIME
Refills: 0 | Status: DISCONTINUED | OUTPATIENT
Start: 2025-06-22 | End: 2025-06-22

## 2025-06-22 RX ORDER — SENNA 187 MG
2 TABLET ORAL AT BEDTIME
Refills: 0 | Status: DISCONTINUED | OUTPATIENT
Start: 2025-06-22 | End: 2025-06-22

## 2025-06-22 RX ORDER — MAGNESIUM, ALUMINUM HYDROXIDE 200-200 MG
30 TABLET,CHEWABLE ORAL EVERY 4 HOURS
Refills: 0 | Status: DISCONTINUED | OUTPATIENT
Start: 2025-06-22 | End: 2025-06-22

## 2025-06-22 RX ORDER — FINASTERIDE 1 MG/1
5 TABLET, FILM COATED ORAL DAILY
Refills: 0 | Status: DISCONTINUED | OUTPATIENT
Start: 2025-06-22 | End: 2025-06-22

## 2025-06-22 RX ORDER — MELATONIN 5 MG
3 TABLET ORAL AT BEDTIME
Refills: 0 | Status: DISCONTINUED | OUTPATIENT
Start: 2025-06-22 | End: 2025-06-22

## 2025-06-22 RX ORDER — MELATONIN 5 MG
3 TABLET ORAL AT BEDTIME
Refills: 0 | Status: DISCONTINUED | OUTPATIENT
Start: 2025-06-22 | End: 2025-06-25

## 2025-06-22 RX ORDER — SODIUM CHLORIDE 9 G/1000ML
1000 INJECTION, SOLUTION INTRAVENOUS
Refills: 0 | Status: DISCONTINUED | OUTPATIENT
Start: 2025-06-22 | End: 2025-06-22

## 2025-06-22 RX ORDER — PREGABALIN 50 MG/1
150 CAPSULE ORAL THREE TIMES A DAY
Refills: 0 | Status: DISCONTINUED | OUTPATIENT
Start: 2025-06-22 | End: 2025-06-22

## 2025-06-22 RX ORDER — DOXAZOSIN MESYLATE 8 MG/1
4 TABLET ORAL AT BEDTIME
Refills: 0 | Status: DISCONTINUED | OUTPATIENT
Start: 2025-06-22 | End: 2025-06-25

## 2025-06-22 RX ORDER — TRAMADOL HYDROCHLORIDE 50 MG/1
50 TABLET, FILM COATED ORAL EVERY 6 HOURS
Refills: 0 | Status: DISCONTINUED | OUTPATIENT
Start: 2025-06-22 | End: 2025-06-22

## 2025-06-22 RX ORDER — SODIUM CHLORIDE 9 G/1000ML
1000 INJECTION, SOLUTION INTRAVENOUS
Refills: 0 | Status: DISCONTINUED | OUTPATIENT
Start: 2025-06-22 | End: 2025-06-23

## 2025-06-22 RX ORDER — HYDROMORPHONE/SOD CHLOR,ISO/PF 2 MG/10 ML
0.25 SYRINGE (ML) INJECTION
Refills: 0 | Status: DISCONTINUED | OUTPATIENT
Start: 2025-06-22 | End: 2025-06-22

## 2025-06-22 RX ORDER — AMLODIPINE BESYLATE 10 MG/1
5 TABLET ORAL DAILY
Refills: 0 | Status: DISCONTINUED | OUTPATIENT
Start: 2025-06-22 | End: 2025-06-22

## 2025-06-22 RX ORDER — HYDROMORPHONE/SOD CHLOR,ISO/PF 2 MG/10 ML
0.5 SYRINGE (ML) INJECTION
Refills: 0 | Status: DISCONTINUED | OUTPATIENT
Start: 2025-06-22 | End: 2025-06-22

## 2025-06-22 RX ORDER — SODIUM CHLORIDE 9 G/1000ML
500 INJECTION, SOLUTION INTRAVENOUS ONCE
Refills: 0 | Status: COMPLETED | OUTPATIENT
Start: 2025-06-22 | End: 2025-06-22

## 2025-06-22 RX ORDER — CEFTRIAXONE 500 MG/1
2000 INJECTION, POWDER, FOR SOLUTION INTRAMUSCULAR; INTRAVENOUS EVERY 24 HOURS
Refills: 0 | Status: DISCONTINUED | OUTPATIENT
Start: 2025-06-22 | End: 2025-06-25

## 2025-06-22 RX ORDER — COLCHICINE 0.6 MG/1
0.6 TABLET, FILM COATED ORAL DAILY
Refills: 0 | Status: DISCONTINUED | OUTPATIENT
Start: 2025-06-22 | End: 2025-06-22

## 2025-06-22 RX ORDER — ACETAMINOPHEN 500 MG/5ML
650 LIQUID (ML) ORAL EVERY 6 HOURS
Refills: 0 | Status: DISCONTINUED | OUTPATIENT
Start: 2025-06-22 | End: 2025-06-22

## 2025-06-22 RX ORDER — ONDANSETRON HCL/PF 4 MG/2 ML
4 VIAL (ML) INJECTION ONCE
Refills: 0 | Status: DISCONTINUED | OUTPATIENT
Start: 2025-06-22 | End: 2025-06-22

## 2025-06-22 RX ORDER — PREGABALIN 50 MG/1
150 CAPSULE ORAL ONCE
Refills: 0 | Status: DISCONTINUED | OUTPATIENT
Start: 2025-06-22 | End: 2025-06-22

## 2025-06-22 RX ORDER — SODIUM CHLORIDE 9 G/1000ML
1000 INJECTION, SOLUTION INTRAVENOUS
Refills: 0 | Status: COMPLETED | OUTPATIENT
Start: 2025-06-22 | End: 2025-06-22

## 2025-06-22 RX ORDER — ONDANSETRON HCL/PF 4 MG/2 ML
4 VIAL (ML) INJECTION EVERY 8 HOURS
Refills: 0 | Status: DISCONTINUED | OUTPATIENT
Start: 2025-06-22 | End: 2025-06-22

## 2025-06-22 RX ORDER — TRAMADOL HYDROCHLORIDE 50 MG/1
50 TABLET, FILM COATED ORAL ONCE
Refills: 0 | Status: DISCONTINUED | OUTPATIENT
Start: 2025-06-22 | End: 2025-06-22

## 2025-06-22 RX ADMIN — Medication 20 MILLIEQUIVALENT(S): at 08:39

## 2025-06-22 RX ADMIN — FINASTERIDE 5 MILLIGRAM(S): 1 TABLET, FILM COATED ORAL at 14:52

## 2025-06-22 RX ADMIN — PREGABALIN 150 MILLIGRAM(S): 50 CAPSULE ORAL at 13:02

## 2025-06-22 RX ADMIN — TRAMADOL HYDROCHLORIDE 50 MILLIGRAM(S): 50 TABLET, FILM COATED ORAL at 00:45

## 2025-06-22 RX ADMIN — CEFTRIAXONE 1000 MILLIGRAM(S): 500 INJECTION, POWDER, FOR SOLUTION INTRAMUSCULAR; INTRAVENOUS at 00:19

## 2025-06-22 RX ADMIN — CEFTRIAXONE 100 MILLIGRAM(S): 500 INJECTION, POWDER, FOR SOLUTION INTRAMUSCULAR; INTRAVENOUS at 05:27

## 2025-06-22 RX ADMIN — SODIUM CHLORIDE 75 MILLILITER(S): 9 INJECTION, SOLUTION INTRAVENOUS at 05:27

## 2025-06-22 RX ADMIN — PREGABALIN 150 MILLIGRAM(S): 50 CAPSULE ORAL at 05:26

## 2025-06-22 RX ADMIN — PREGABALIN 150 MILLIGRAM(S): 50 CAPSULE ORAL at 00:45

## 2025-06-22 RX ADMIN — Medication 300 MILLIGRAM(S): at 22:04

## 2025-06-22 RX ADMIN — TRAMADOL HYDROCHLORIDE 50 MILLIGRAM(S): 50 TABLET, FILM COATED ORAL at 05:27

## 2025-06-22 RX ADMIN — SODIUM CHLORIDE 1000 MILLILITER(S): 9 INJECTION, SOLUTION INTRAVENOUS at 20:30

## 2025-06-22 RX ADMIN — DOXAZOSIN MESYLATE 4 MILLIGRAM(S): 8 TABLET ORAL at 22:05

## 2025-06-22 RX ADMIN — Medication 3 MILLIGRAM(S): at 22:06

## 2025-06-22 RX ADMIN — Medication 40 MILLIGRAM(S): at 05:27

## 2025-06-22 RX ADMIN — COLCHICINE 0.6 MILLIGRAM(S): 0.6 TABLET, FILM COATED ORAL at 13:03

## 2025-06-22 RX ADMIN — Medication 300 MILLIGRAM(S): at 13:03

## 2025-06-22 RX ADMIN — AMLODIPINE BESYLATE 5 MILLIGRAM(S): 10 TABLET ORAL at 05:27

## 2025-06-22 RX ADMIN — CEFTRIAXONE 100 MILLIGRAM(S): 500 INJECTION, POWDER, FOR SOLUTION INTRAMUSCULAR; INTRAVENOUS at 22:05

## 2025-06-22 NOTE — BRIEF OPERATIVE NOTE - NSICDXBRIEFPROCEDURE_GEN_ALL_CORE_FT
PROCEDURES:  Cystoscopy, with ureteral stent insertion 22-Jun-2025 20:14:03  Carine Walton  Retrograde pyelogram 22-Jun-2025 20:14:12  Carine Walton

## 2025-06-22 NOTE — CONSULT NOTE ADULT - SUBJECTIVE AND OBJECTIVE BOX
CC: left distal ureteral stone    HPI:    Mr Osuna is a 74 year old male brought in by his daughter from home because of  headache, neck, back pain, right flank pain, weakness, fatigue and nausea, found to have fever of 102.7.   Pt has history of hypertension, hyperlipidemia, chronic diastolic CHF, hx of CAD, BPH, AAA, GERD, Gout, arthritis, chronic back pain,  He denies, chest pain, cough, abdominal pain, vomiting, diarrhea.  Denies sick contacts.  Cxray is unremarkable.  EKG showed NSR 80/min.  Urinalysis showed +ve leuk esterase (mod), +ve nitrite.  Labs reviewed - elev WBC to [address] with left shift, K is 3.2.  Initial lactate was 2.1, which nomalized after IV fluids and IV Ceftriaxone.       PAST MEDICAL & SURGICAL HISTORY:  Hypertension      GERD (gastroesophageal reflux disease)      Insomnia      Fall  with several injuries      TIA (transient ischemic attack)  2008      Aneurysm  of the Aortic Root      Dizziness      Gout      Measles      Mumps      Nutcracker esophagus      Back pain      SVT (supraventricular tachycardia)      TIA (transient ischemic attack)      OA (osteoarthritis)      Nutcracker esophagus      Vertigo      Tinnitus      S/P knee replacement  bilateral-2006      S/P lumbar and lumbosacral fusion by anterior technique  2004      S/P spinal fusion  C 5,6,7-2009      S/P spinal fusion  2009-L2-L3 L3-L4      H/O cardiac catheterization      History of appendectomy        FAMILY HISTORY:  CAD (coronary artery disease) (Sibling)      SOCIAL HISTORY:   Tobacco hx:  MEDICATIONS  (STANDING):  allopurinol 300 milliGRAM(s) Oral daily  amLODIPine   Tablet 5 milliGRAM(s) Oral daily  atorvastatin 20 milliGRAM(s) Oral at bedtime  cefTRIAXone   IVPB 1000 milliGRAM(s) IV Intermittent every 24 hours  colchicine 0.6 milliGRAM(s) Oral daily  finasteride 5 milliGRAM(s) Oral daily  pantoprazole    Tablet 40 milliGRAM(s) Oral before breakfast  pregabalin 150 milliGRAM(s) Oral three times a day  senna 2 Tablet(s) Oral at bedtime    MEDICATIONS  (PRN):  acetaminophen     Tablet .. 650 milliGRAM(s) Oral every 6 hours PRN Temp greater or equal to 38C (100.4F), Mild Pain (1 - 3)  aluminum hydroxide/magnesium hydroxide/simethicone Suspension 30 milliLiter(s) Oral every 4 hours PRN Dyspepsia  melatonin 3 milliGRAM(s) Oral at bedtime PRN Insomnia  ondansetron Injectable 4 milliGRAM(s) IV Push every 8 hours PRN Nausea and/or Vomiting  traMADol 50 milliGRAM(s) Oral every 6 hours PRN Mod (4-6) to Severe Pain (7 - 10)  zolpidem 5 milliGRAM(s) Oral at bedtime PRN Insomnia  zolpidem 5 milliGRAM(s) Oral at bedtime PRN Insomnia    Allergies    sulfa drugs (Rash)  penicillin (Unknown)    Intolerances    MEDICATIONS  (STANDING):  allopurinol 300 milliGRAM(s) Oral daily  amLODIPine   Tablet 5 milliGRAM(s) Oral daily  atorvastatin 20 milliGRAM(s) Oral at bedtime  cefTRIAXone   IVPB 1000 milliGRAM(s) IV Intermittent every 24 hours  colchicine 0.6 milliGRAM(s) Oral daily  finasteride 5 milliGRAM(s) Oral daily  pantoprazole    Tablet 40 milliGRAM(s) Oral before breakfast  pregabalin 150 milliGRAM(s) Oral three times a day  senna 2 Tablet(s) Oral at bedtime    MEDICATIONS  (PRN):  acetaminophen     Tablet .. 650 milliGRAM(s) Oral every 6 hours PRN Temp greater or equal to 38C (100.4F), Mild Pain (1 - 3)  aluminum hydroxide/magnesium hydroxide/simethicone Suspension 30 milliLiter(s) Oral every 4 hours PRN Dyspepsia  melatonin 3 milliGRAM(s) Oral at bedtime PRN Insomnia  ondansetron Injectable 4 milliGRAM(s) IV Push every 8 hours PRN Nausea and/or Vomiting  traMADol 50 milliGRAM(s) Oral every 6 hours PRN Mod (4-6) to Severe Pain (7 - 10)  zolpidem 5 milliGRAM(s) Oral at bedtime PRN Insomnia  zolpidem 5 milliGRAM(s) Oral at bedtime PRN Insomnia      REVIEW OF SYSTEMS: Pertinent positives and negatives as stated in HPI, otherwise negative    Vital signs  T(C): 37.2 (06-22-25 @ 12:04), Max: 39.1 (06-21-25 @ 19:00)  HR: 64 (06-22-25 @ 12:04)  BP: 115/65 (06-22-25 @ 12:04)  SpO2: 96% (06-22-25 @ 12:04)  Wt(kg): --    Output      Physical Exam  Gen: NAD  Pulm: No respiratory distress, no subcostal retractions  CV: RRR, no JVD  Abd: Soft, NT, ND  : Uncircumcised/Circumcised, no lesions.  No discharge or blood at urethral meatus.  Testes descended bilaterally.  Testes and epididymis nontender bilaterally.  Cremasteric reflex present bilaterally.  MSK: No edema present      LABS:        06-22 @ 05:40    WBC 13.02 / Hct 39.0  / SCr 1.09     06-21 @ 19:31    WBC 13.96 / Hct 41.6  / SCr 1.31     06-22    138  |  104  |  16  ----------------------------<  116[H]  3.4[L]   |  28  |  1.09    Ca    8.2[L]      22 Jun 2025 05:40  Mg     1.6     06-22    TPro  7.2  /  Alb  3.5  /  TBili  0.8  /  DBili  x   /  AST  26  /  ALT  24  /  AlkPhos  82  06-21    PT/INR - ( 21 Jun 2025 19:31 )   PT: 15.3 sec;   INR: 1.30 ratio         PTT - ( 21 Jun 2025 19:31 )  PTT:32.0 sec  Urinalysis Basic - ( 22 Jun 2025 05:40 )    Color: x / Appearance: x / SG: x / pH: x  Gluc: 116 mg/dL / Ketone: x  / Bili: x / Urobili: x   Blood: x / Protein: x / Nitrite: x   Leuk Esterase: x / RBC: x / WBC x   Sq Epi: x / Non Sq Epi: x / Bacteria: x        ACC: [id number] EXAM:  CT ABDOMEN AND PELVIS   ORDERED BY: MERCEDEZ CAMPOS     PROCEDURE DATE:  06/22/2025          INTERPRETATION:  CLINICAL INFORMATION: Right flank pain. Sepsis.    COMPARISON: CT 9/11/2012. MRI 10/26/2020.    CONTRAST/COMPLICATIONS:  IV Contrast: NONE  Oral Contrast: NONE.    PROCEDURE:  CT of the Abdomen and Pelvis was performed.  Sagittal and coronal reformats were performed.    FINDINGS:  LOWER CHEST: No acute abnormality.    LIVER: Within normal limits.  BILE DUCTS: Normal caliber.  GALLBLADDER: Within normal limits.  SPLEEN: Within normal limits.  PANCREAS: Within normal limits.  ADRENALS: Within normal limits.  KIDNEYS/URETERS: 1.7 cm probable hyperdense cyst upper pole right kidney.   No right hydronephrosis or nephrolithiasis. Mild left hydronephrosis   caused by a 0.6 cm distal ureteral calculus, about 3 cm proximal to the   ureterovesical junction.    BLADDER: Within normal limits.  REPRODUCTIVE ORGANS: No prostatic enlargement.    BOWEL: No bowel obstruction. Appendix is not visualized. Colonic   diverticulosis.  PERITONEUM/RETROPERITONEUM: Within normal limits.  VESSELS: Atherosclerotic changes. No abdominal aortic aneurysm.  LYMPH NODES: No lymphadenopathy.  ABDOMINAL WALL: Postoperative changes.  BONES:Extensive thoracolumbar posterior fusion and disc spacer placement.    IMPRESSION:    Left hydronephrosis caused by a 6 mm distal ureteral calculus.    --- End of Report ---            ILIANA HANLEY MD; Attending Radiologist    Urine Cx:   Blood Cx:    Radiology:     CC: left distal ureteral stone    HPI:  Patient seen and examined. Appears malaised and febrile. Denies pain. Requested temperature 101.4 on Ceftriaxone. Daughter at bedside. Denies previous history of renal calculus. ?  via [facility]. PCP Rito Zhang - [facility].    Mr Osuna is a 74 year old male brought in by his daughter from home because of  headache, neck, back pain, right flank pain, weakness, fatigue and nausea, found to have fever of 102.7.   Pt has history of hypertension, hyperlipidemia, chronic diastolic CHF, hx of CAD, BPH, AAA, GERD, Gout, arthritis, chronic back pain,  He denies, chest pain, cough, abdominal pain, vomiting, diarrhea.  Denies sick contacts.  Cxray is unremarkable.  EKG showed NSR 80/min.  Urinalysis showed +ve leuk esterase (mod), +ve nitrite.  Labs reviewed - elev WBC to 27[address] with left shift, K is 3.2.  Initial lactate was 2.1, which nomalized after IV fluids and IV Ceftriaxone.       PAST MEDICAL & SURGICAL HISTORY:  Hypertension      GERD (gastroesophageal reflux disease)      Insomnia      Fall  with several injuries      TIA (transient ischemic attack)  2008      Aneurysm  of the Aortic Root      Dizziness      Gout      Measles      Mumps      Nutcracker esophagus      Back pain      SVT (supraventricular tachycardia)      TIA (transient ischemic attack)      OA (osteoarthritis)      Nutcracker esophagus      Vertigo      Tinnitus      S/P knee replacement  bilateral-2006      S/P lumbar and lumbosacral fusion by anterior technique  2004      S/P spinal fusion  C 5,6,7-2009      S/P spinal fusion  2009-L2-L3 L3-L4      H/O cardiac catheterization      History of appendectomy        FAMILY HISTORY:  CAD (coronary artery disease) (Sibling)      SOCIAL HISTORY:   Tobacco hx:  MEDICATIONS  (STANDING):  allopurinol 300 milliGRAM(s) Oral daily  amLODIPine   Tablet 5 milliGRAM(s) Oral daily  atorvastatin 20 milliGRAM(s) Oral at bedtime  cefTRIAXone   IVPB 1000 milliGRAM(s) IV Intermittent every 24 hours  colchicine 0.6 milliGRAM(s) Oral daily  finasteride 5 milliGRAM(s) Oral daily  pantoprazole    Tablet 40 milliGRAM(s) Oral before breakfast  pregabalin 150 milliGRAM(s) Oral three times a day  senna 2 Tablet(s) Oral at bedtime    MEDICATIONS  (PRN):  acetaminophen     Tablet .. 650 milliGRAM(s) Oral every 6 hours PRN Temp greater or equal to 38C (100.4F), Mild Pain (1 - 3)  aluminum hydroxide/magnesium hydroxide/simethicone Suspension 30 milliLiter(s) Oral every 4 hours PRN Dyspepsia  melatonin 3 milliGRAM(s) Oral at bedtime PRN Insomnia  ondansetron Injectable 4 milliGRAM(s) IV Push every 8 hours PRN Nausea and/or Vomiting  traMADol 50 milliGRAM(s) Oral every 6 hours PRN Mod (4-6) to Severe Pain (7 - 10)  zolpidem 5 milliGRAM(s) Oral at bedtime PRN Insomnia  zolpidem 5 milliGRAM(s) Oral at bedtime PRN Insomnia    Allergies    sulfa drugs (Rash)  penicillin (Unknown)    Intolerances    MEDICATIONS  (STANDING):  allopurinol 300 milliGRAM(s) Oral daily  amLODIPine   Tablet 5 milliGRAM(s) Oral daily  atorvastatin 20 milliGRAM(s) Oral at bedtime  cefTRIAXone   IVPB 1000 milliGRAM(s) IV Intermittent every 24 hours  colchicine 0.6 milliGRAM(s) Oral daily  finasteride 5 milliGRAM(s) Oral daily  pantoprazole    Tablet 40 milliGRAM(s) Oral before breakfast  pregabalin 150 milliGRAM(s) Oral three times a day  senna 2 Tablet(s) Oral at bedtime    MEDICATIONS  (PRN):  acetaminophen     Tablet .. 650 milliGRAM(s) Oral every 6 hours PRN Temp greater or equal to 38C (100.4F), Mild Pain (1 - 3)  aluminum hydroxide/magnesium hydroxide/simethicone Suspension 30 milliLiter(s) Oral every 4 hours PRN Dyspepsia  melatonin 3 milliGRAM(s) Oral at bedtime PRN Insomnia  ondansetron Injectable 4 milliGRAM(s) IV Push every 8 hours PRN Nausea and/or Vomiting  traMADol 50 milliGRAM(s) Oral every 6 hours PRN Mod (4-6) to Severe Pain (7 - 10)  zolpidem 5 milliGRAM(s) Oral at bedtime PRN Insomnia  zolpidem 5 milliGRAM(s) Oral at bedtime PRN Insomnia      REVIEW OF SYSTEMS: Pertinent positives and negatives as stated in HPI, otherwise negative    Vital signs  T(C): 37.2 (06-22-25 @ 12:04), Max: 39.1 (06-21-25 @ 19:00)  HR: 64 (06-22-25 @ 12:04)  BP: 115/65 (06-22-25 @ 12:04)  SpO2: 96% (06-22-25 @ 12:04)  Wt(kg): --    Output      Physical Exam  Gen: NAD  Pulm: No respiratory distress, no subcostal retractions  CV: RRR, no JVD  Abd: Soft, NT, ND  MSK: No edema present      LABS:        06-22 @ 05:40    WBC 13.02 / Hct 39.0  / SCr 1.09     06-21 @ 19:31    WBC 13.96 / Hct 41.6  / SCr 1.31     06-22    138  |  104  |  16  ----------------------------<  116[H]  3.4[L]   |  28  |  1.09    Ca    8.2[L]      22 Jun 2025 05:40  Mg     1.6     06-22    TPro  7.2  /  Alb  3.5  /  TBili  0.8  /  DBili  x   /  AST  26  /  ALT  24  /  AlkPhos  82  06-21    PT/INR - ( 21 Jun 2025 19:31 )   PT: 15.3 sec;   INR: 1.30 ratio         PTT - ( 21 Jun 2025 19:31 )  PTT:32.0 sec  Urinalysis Basic - ( 22 Jun 2025 05:40 )  The patient is a Patient is a 74y old  Male who presents with a chief complaint of fever (22 Jun 2025 15:54)      Vital Signs Last 24 Hrs  T(C): 37.2 (22 Jun 2025 12:04), Max: 39.1 (21 Jun 2025 19:00)  T(F): 99 (22 Jun 2025 12:04), Max: 102.4 (21 Jun 2025 19:00)  HR: 64 (22 Jun 2025 12:04) (63 - 108)  BP: 115/65 (22 Jun 2025 12:04) (115/65 - 143/82)  BP(mean): 95 (21 Jun 2025 22:30) (89 - 101)  RR: 17 (22 Jun 2025 12:04) (16 - 25)  SpO2: 96% (22 Jun 2025 12:04) (93% - 100%)    Parameters below as of 22 Jun 2025 12:04  Patient On (Oxygen Delivery Method): room air        ROS  Normal respiration  No chest pain    Physical exam  alert  nl respiratory effort  no cva tenderness    Urine:     I&O's Summary    22 Jun 2025 07:01  -  22 Jun 2025 17:23  --------------------------------------------------------  IN: 500 mL / OUT: 300 mL / NET: 200 mL        LABS:                        12.5   13.02 )-----------( 133      ( 22 Jun 2025 05:40 )             39.0     06-22    138  |  104  |  16  ----------------------------<  116[H]  3.4[L]   |  28  |  1.09    Ca    8.2[L]      22 Jun 2025 05:40  Mg     1.6     06-22    TPro  7.2  /  Alb  3.5  /  TBili  0.8  /  DBili  x   /  AST  26  /  ALT  24  /  AlkPhos  82  06-21    Urinalysis Basic - ( 22 Jun 2025 05:40 )  Urine Microscopic-Add On (NC) (06.21.25 @ 23:04)    White Blood Cell - Urine: 30 /HPF   Red Blood Cell - Urine: 8 /HPF   Bacteria: Many /HPF   Hyaline Casts: None Seen   Squamous Epithelial Cells: None Seen        Urine Culture:       Radiology    Prior notes/chart reviewed.          ACC: [id number] EXAM:  CT ABDOMEN AND PELVIS   ORDERED BY: MERCEDEZ CAMPOS     PROCEDURE DATE:  06/22/2025          INTERPRETATION:  CLINICAL INFORMATION: Right flank pain. Sepsis.    COMPARISON: CT 9/11/2012. MRI 10/26/2020.    CONTRAST/COMPLICATIONS:  IV Contrast: NONE  Oral Contrast: NONE.    PROCEDURE:  CT of the Abdomen and Pelvis was performed.  Sagittal and coronal reformats were performed.    FINDINGS:  LOWER CHEST: No acute abnormality.    LIVER: Within normal limits.  BILE DUCTS: Normal caliber.  GALLBLADDER: Within normal limits.  SPLEEN: Within normal limits.  PANCREAS: Within normal limits.  ADRENALS: Within normal limits.  KIDNEYS/URETERS: 1.7 cm probable hyperdense cyst upper pole right kidney.   No right hydronephrosis or nephrolithiasis. Mild left hydronephrosis   caused by a 0.6 cm distal ureteral calculus, about 3 cm proximal to the   ureterovesical junction.    BLADDER: Within normal limits.  REPRODUCTIVE ORGANS: No prostatic enlargement.    BOWEL: No bowel obstruction. Appendix is not visualized. Colonic   diverticulosis.  PERITONEUM/RETROPERITONEUM: Within normal limits.  VESSELS: Atherosclerotic changes. No abdominal aortic aneurysm.  LYMPH NODES: No lymphadenopathy.  ABDOMINAL WALL: Postoperative changes.  BONES:Extensive thoracolumbar posterior fusion and disc spacer placement.    IMPRESSION:    Left hydronephrosis caused by a 6 mm distal ureteral calculus.    --- End of Report ---            ILIANA HANLEY MD; Attending Radiologist    Urine Cx:   Blood Cx:    Radiology:

## 2025-06-22 NOTE — H&P ADULT - TIME BILLING
Management of acute medical problem, thorough review of labs, imaging and discussion with consultant.   In my medical judgement, I deem it necessary for this patient to require inpatient management, at least 2 midnight stay, possibly more, for acute treatment and reassessment, event monitoring that may require immediate intervention and further diagnostic and medical evaluation to establish a definitive treatment plan.

## 2025-06-22 NOTE — H&P ADULT - HISTORY OF PRESENT ILLNESS
70 year old male from home with hypertension, hyperlipidemia, chronic diastolic CHF, hx of CAD, BPH, AAA, GERD, Gout, arthritis, chronic back pain,   Mr Osuna is a 74 year old male from home with hypertension, hyperlipidemia, chronic diastolic CHF, hx of CAD, BPH, AAA, GERD, Gout, arthritis, chronic back pain,   Mr Enrrique is a 74 year old male brought in by his daughter from home because of  headache, neck, back pain, weakness, fatigue and nausea, found to have fever of 102.7.   Pt has history of hypertension, hyperlipidemia, chronic diastolic CHF, hx of CAD, BPH, AAA, GERD, Gout, arthritis, chronic back pain,  He denies, chest pain, cough, abdominal pain, vomiting, diarrhea.  Denies sick contacts.  Cxray is unremarkable.  EKG showed NSR 80/min.  Urinalysis showed +ve leuk esterase (mod), +ve nitrite.  Labs reviewed - elev WBC to 0[address] with left shift, K is 3.2.  Initial lactate was 2.1, which nomalized after IV fluids and IV Ceftriaxone.    Mr Enrrique is a 74 year old male brought in by his daughter from home because of  headache, neck, back pain, right flank pain, weakness, fatigue and nausea, found to have fever of 102.7.   Pt has history of hypertension, hyperlipidemia, chronic diastolic CHF, hx of CAD, BPH, AAA, GERD, Gout, arthritis, chronic back pain,  He denies, chest pain, cough, abdominal pain, vomiting, diarrhea.  Denies sick contacts.  Cxray is unremarkable.  EKG showed NSR 80/min.  Urinalysis showed +ve leuk esterase (mod), +ve nitrite.  Labs reviewed - elev WBC to 19245 with left shift, K is 3.2.  Initial lactate was 2.1, which nomalized after IV fluids and IV Ceftriaxone.

## 2025-06-22 NOTE — PATIENT PROFILE ADULT - NSPRESCRALCSIXMORE_GEN_A_NUR
drain in abdomen, 500 ml drained daily, appears serosanguinous Never LUQ drain; reports drains serosanguinous fluid every morning ~500 mls

## 2025-06-22 NOTE — PATIENT PROFILE ADULT - FALL HARM RISK - RISK INTERVENTIONS
Assistance OOB with selected safe patient handling equipment/Communicate Fall Risk and Risk Factors to all staff, patient, and family/Discuss with provider need for PT consult/Monitor gait and stability/Provide patient with walking aids - walker, cane, crutches/Reinforce activity limits and safety measures with patient and family/Visual Cue: Yellow wristband/Bed in lowest position, wheels locked, appropriate side rails in place/Call bell, personal items and telephone in reach/Instruct patient to call for assistance before getting out of bed or chair/Non-slip footwear when patient is out of bed/[place] to call system/Physically safe environment - no spills, clutter or unnecessary equipment/Purposeful Proactive Rounding/Room/bathroom lighting operational, light cord in reach Assistance OOB with selected safe patient handling equipment/Communicate Fall Risk and Risk Factors to all staff, patient, and family/Discuss with provider need for PT consult/Monitor gait and stability/Provide patient with walking aids - walker, cane, crutches/Reinforce activity limits and safety measures with patient and family/Use of alarms - bed, chair and/or voice tab/Visual Cue: Yellow wristband/Bed in lowest position, wheels locked, appropriate side rails in place/Call bell, personal items and telephone in reach/Instruct patient to call for assistance before getting out of bed or chair/Non-slip footwear when patient is out of bed/[place] to call system/Physically safe environment - no spills, clutter or unnecessary equipment/Purposeful Proactive Rounding/Room/bathroom lighting operational, light cord in reach

## 2025-06-22 NOTE — CONSULT NOTE ADULT - TIME BILLING
review of chart and imaging.  Surgical decision making. Surgical coordination, Discussing with family, discussion with team. Discussion with anesthesiologist, discussion with nursing supervising. counseling.

## 2025-06-22 NOTE — H&P ADULT - ASSESSMENT
Mr Enrrique is a 74 year old male brought in by his daughter from home because of  headache, neck, back pain, weakness, fatigue and nausea, found to have fever of 102.7.   Pt has history of hypertension, hyperlipidemia, chronic diastolic CHF, hx of CAD, BPH, AAA, GERD, Gout, arthritis, chronic back pain,  He denies, chest pain, cough, abdominal pain, vomiting, diarrhea.  Denies sick contacts.  Cxray is unremarkable.  EKG showed NSR 80/min.  Urinalysis showed +ve leuk esterase (mod), +ve nitrite.  Labs reviewed - elev WBC to 37078 with left shift, K is 3.2.  Initial lactate was 2.1, which nomalized after IV fluids and IV Ceftriaxone.  Mr Enrrique is a 74 year old male brought in by his daughter from home because of  headache, neck, back pain, right flank pain, weakness, fatigue and nausea, found to have fever of 102.7.   Pt has history of hypertension, hyperlipidemia, chronic diastolic CHF, hx of CAD, BPH, AAA, GERD, Gout, arthritis, chronic back pain,  He denies, chest pain, cough, abdominal pain, vomiting, diarrhea.  Denies sick contacts.  Cxray is unremarkable.  EKG showed NSR 80/min.  Urinalysis showed +ve leuk esterase (mod), +ve nitrite.  Labs reviewed - elev WBC to 34874 with left shift, K is 3.2.  Initial lactate was 2.1, which nomalized after IV fluids and IV Ceftriaxone.     Fever- source appears to be genitourinary=UTI  -will get CT abdomen-r/o stone as pt is complaining of right flank pain  -Cover with IV Ceftriaxone pending cultures  -IV hydration  -analgesics as needed  -ffup labs    Hypokalemia, repleted, ffup labs    Hypertension- stable, cont Amlodipine    Gout- stable, cont Allopurinol, Colchicine    Chronic back pain - analgesics as needed, cont Lyrica    Hyperlipidemia - cont Atorvastatin    GERD= cont pantoprazole

## 2025-06-22 NOTE — BRIEF OPERATIVE NOTE - NSICDXBRIEFPREOP_GEN_ALL_CORE_FT
PRE-OP DIAGNOSIS:  Left ureteral stone 22-Jun-2025 20:14:27  Carine Walton  Hydronephrosis with ureteral calculus 22-Jun-2025 20:14:42  Carine Walton  Acute UTI 22-Jun-2025 20:14:49  Carine Walton

## 2025-06-22 NOTE — CHART NOTE - NSCHARTNOTEFT_GEN_A_CORE
73yo male admitted this am for UTI.     Pt seen and examined at bedside.     T(C): 37.9 (06-22-25 @ 05:12), Max: 39.1 (06-21-25 @ 19:00)  HR: 66 (06-22-25 @ 05:12) (63 - 108)  BP: 119/55 (06-22-25 @ 05:12) (119/55 - 143/82)  RR: 18 (06-22-25 @ 05:12) (16 - 25)  SpO2: 93% (06-22-25 @ 05:12) (93% - 100%)  Wt(kg): --Vital Signs Last 24 Hrs  T(C): 37.9 (22 Jun 2025 05:12), Max: 39.1 (21 Jun 2025 19:00)  T(F): 100.3 (22 Jun 2025 05:12), Max: 102.4 (21 Jun 2025 19:00)  HR: 66 (22 Jun 2025 05:12) (63 - 108)  BP: 119/55 (22 Jun 2025 05:12) (119/55 - 143/82)  BP(mean): 95 (21 Jun 2025 22:30) (89 - 101)  RR: 18 (22 Jun 2025 05:12) (16 - 25)  SpO2: 93% (22 Jun 2025 05:12) (93% - 100%)    Parameters below as of 22 Jun 2025 05:12  Patient On (Oxygen Delivery Method): room air        PHYSICAL EXAM:  GENERAL: NAD  NECK: Supple, No JVD, Normal thyroid  NERVOUS SYSTEM:  Motor Strength 5/5 B/L upper and lower extremities. Alert and oriented x 3  CHEST/LUNG:  No rales, rhonchi, wheezing, or rubs; normal respiratory effort, no intercostal retractions; No pitting edema  HEART: Regular rate and rhythm; No murmurs, rubs, or gallops  ABDOMEN: Soft, Nontender, Nondistended; Bowel sounds present; No HSM  MUSCULOSKELETAL/EXTREMITIES:  2+ Peripheral Pulses, No clubbing, or digital cyanosis  SKIN: No rashes or lesions; normal texture and temperature  PSYCH: Appropriate affect      #UTI. wbc 13.96-->13.02. continue ceftriaxone. f/u CT A/P. monitor fever curve.     all questions answered at bedside.     see H&P for rest of plan of care. 75yo male admitted this am for UTI.     Pt seen and examined at bedside.     T(C): 37.9 (06-22-25 @ 05:12), Max: 39.1 (06-21-25 @ 19:00)  HR: 66 (06-22-25 @ 05:12) (63 - 108)  BP: 119/55 (06-22-25 @ 05:12) (119/55 - 143/82)  RR: 18 (06-22-25 @ 05:12) (16 - 25)  SpO2: 93% (06-22-25 @ 05:12) (93% - 100%)  Wt(kg): --Vital Signs Last 24 Hrs  T(C): 37.9 (22 Jun 2025 05:12), Max: 39.1 (21 Jun 2025 19:00)  T(F): 100.3 (22 Jun 2025 05:12), Max: 102.4 (21 Jun 2025 19:00)  HR: 66 (22 Jun 2025 05:12) (63 - 108)  BP: 119/55 (22 Jun 2025 05:12) (119/55 - 143/82)  BP(mean): 95 (21 Jun 2025 22:30) (89 - 101)  RR: 18 (22 Jun 2025 05:12) (16 - 25)  SpO2: 93% (22 Jun 2025 05:12) (93% - 100%)    Parameters below as of 22 Jun 2025 05:12  Patient On (Oxygen Delivery Method): room air          PHYSICAL EXAM:  GENERAL: NAD  NECK: Supple, No JVD, Normal thyroid  NERVOUS SYSTEM:  Motor Strength 5/5 B/L upper and lower extremities. Alert and oriented x 3  CHEST/LUNG:  No rales, rhonchi, wheezing, or rubs; normal respiratory effort, no intercostal retractions; No pitting edema  HEART: Regular rate and rhythm; No murmurs, rubs, or gallops  ABDOMEN: Soft, Nontender, Nondistended; Bowel sounds present; No HSM  MUSCULOSKELETAL/EXTREMITIES:  2+ Peripheral Pulses, No clubbing, or digital cyanosis  SKIN: No rashes or lesions; normal texture and temperature  PSYCH: Appropriate affect      #UTI. wbc 13.96-->13.02. continue ceftriaxone. f/u CT A/P. monitor fever curve.     all questions answered at bedside.     see H&P for rest of plan of care. 73yo male admitted this am for UTI.     Pt seen and examined at bedside.     T(C): 37.9 (06-22-25 @ 05:12), Max: 39.1 (06-21-25 @ 19:00)  HR: 66 (06-22-25 @ 05:12) (63 - 108)  BP: 119/55 (06-22-25 @ 05:12) (119/55 - 143/82)  RR: 18 (06-22-25 @ 05:12) (16 - 25)  SpO2: 93% (06-22-25 @ 05:12) (93% - 100%)  Wt(kg): --Vital Signs Last 24 Hrs  T(C): 37.9 (22 Jun 2025 05:12), Max: 39.1 (21 Jun 2025 19:00)  T(F): 100.3 (22 Jun 2025 05:12), Max: 102.4 (21 Jun 2025 19:00)  HR: 66 (22 Jun 2025 05:12) (63 - 108)  BP: 119/55 (22 Jun 2025 05:12) (119/55 - 143/82)  BP(mean): 95 (21 Jun 2025 22:30) (89 - 101)  RR: 18 (22 Jun 2025 05:12) (16 - 25)  SpO2: 93% (22 Jun 2025 05:12) (93% - 100%)    Parameters below as of 22 Jun 2025 05:12  Patient On (Oxygen Delivery Method): room air          PHYSICAL EXAM:  GENERAL: NAD  NECK: Supple, No JVD, Normal thyroid  NERVOUS SYSTEM:  Motor Strength 5/5 B/L upper and lower extremities. Alert and oriented x 3  CHEST/LUNG:  No rales, rhonchi, wheezing, or rubs; normal respiratory effort, no intercostal retractions; No pitting edema  HEART: Regular rate and rhythm; No murmurs, rubs, or gallops  ABDOMEN: Soft, Nontender, Nondistended; Bowel sounds present; No HSM  MUSCULOSKELETAL/EXTREMITIES:  2+ Peripheral Pulses, No clubbing, or digital cyanosis  SKIN: No rashes or lesions; normal texture and temperature  PSYCH: Appropriate affect      #UTI. wbc 13.96-->13.02. continue ceftriaxone.  monitor fever curve.     CT A/P done and shows mild left hydronephrosis with left ureteral stone-6mm. also with right kidney stone.    consulted Urology Dr. Walton. f/u recs    add flomax    continue abx and IVFs    all questions answered at bedside.     see H&P for rest of plan of care. 75yo male admitted this am for UTI.     Pt seen and examined at bedside.     T(C): 37.9 (06-22-25 @ 05:12), Max: 39.1 (06-21-25 @ 19:00)  HR: 66 (06-22-25 @ 05:12) (63 - 108)  BP: 119/55 (06-22-25 @ 05:12) (119/55 - 143/82)  RR: 18 (06-22-25 @ 05:12) (16 - 25)  SpO2: 93% (06-22-25 @ 05:12) (93% - 100%)  Wt(kg): --Vital Signs Last 24 Hrs  T(C): 37.9 (22 Jun 2025 05:12), Max: 39.1 (21 Jun 2025 19:00)  T(F): 100.3 (22 Jun 2025 05:12), Max: 102.4 (21 Jun 2025 19:00)  HR: 66 (22 Jun 2025 05:12) (63 - 108)  BP: 119/55 (22 Jun 2025 05:12) (119/55 - 143/82)  BP(mean): 95 (21 Jun 2025 22:30) (89 - 101)  RR: 18 (22 Jun 2025 05:12) (16 - 25)  SpO2: 93% (22 Jun 2025 05:12) (93% - 100%)    Parameters below as of 22 Jun 2025 05:12  Patient On (Oxygen Delivery Method): room air          PHYSICAL EXAM:  GENERAL: NAD  NECK: Supple, No JVD, Normal thyroid  NERVOUS SYSTEM:  Motor Strength 5/5 B/L upper and lower extremities. Alert and oriented x 3  CHEST/LUNG:  No rales, rhonchi, wheezing, or rubs; normal respiratory effort, no intercostal retractions; No pitting edema  HEART: Regular rate and rhythm; No murmurs, rubs, or gallops  ABDOMEN: Soft, Nontender, Nondistended; Bowel sounds present; No HSM  MUSCULOSKELETAL/EXTREMITIES:  2+ Peripheral Pulses, No clubbing, or digital cyanosis  SKIN: No rashes or lesions; normal texture and temperature  PSYCH: Appropriate affect      #UTI. wbc 13.96-->13.02. continue ceftriaxone.  monitor fever curve.     CT A/P done and shows mild left hydronephrosis with left ureteral stone-6mm. also with right kidney upper pole 1.7cm cyst.    consulted Urology Dr. Walton. f/u recs    pt has sulfa allergy. will add finasteride instead of flomax.    continue abx and IVFs    all questions answered at bedside.     see H&P for rest of plan of care. 75yo male admitted this am for UTI.     Pt seen and examined at bedside.     T(C): 37.9 (06-22-25 @ 05:12), Max: 39.1 (06-21-25 @ 19:00)  HR: 66 (06-22-25 @ 05:12) (63 - 108)  BP: 119/55 (06-22-25 @ 05:12) (119/55 - 143/82)  RR: 18 (06-22-25 @ 05:12) (16 - 25)  SpO2: 93% (06-22-25 @ 05:12) (93% - 100%)  Wt(kg): --Vital Signs Last 24 Hrs  T(C): 37.9 (22 Jun 2025 05:12), Max: 39.1 (21 Jun 2025 19:00)  T(F): 100.3 (22 Jun 2025 05:12), Max: 102.4 (21 Jun 2025 19:00)  HR: 66 (22 Jun 2025 05:12) (63 - 108)  BP: 119/55 (22 Jun 2025 05:12) (119/55 - 143/82)  BP(mean): 95 (21 Jun 2025 22:30) (89 - 101)  RR: 18 (22 Jun 2025 05:12) (16 - 25)  SpO2: 93% (22 Jun 2025 05:12) (93% - 100%)    Parameters below as of 22 Jun 2025 05:12  Patient On (Oxygen Delivery Method): room air          PHYSICAL EXAM:  GENERAL: NAD  NECK: Supple, No JVD, Normal thyroid  NERVOUS SYSTEM:  Motor Strength 5/5 B/L upper and lower extremities. Alert and oriented x 3  CHEST/LUNG:  No rales, rhonchi, wheezing, or rubs; normal respiratory effort, no intercostal retractions; No pitting edema  HEART: Regular rate and rhythm; No murmurs, rubs, or gallops  ABDOMEN: Soft, Nontender, Nondistended; Bowel sounds present; No HSM  MUSCULOSKELETAL/EXTREMITIES:  2+ Peripheral Pulses, No clubbing, or digital cyanosis  SKIN: No rashes or lesions; normal texture and temperature  PSYCH: Appropriate affect      #sepsis POA  #hydronephrosis  #ureteral stone  #UTI. wbc 13.96-->13.02. continue ceftriaxone. lactate normalized. monitor fever curve.     CT A/P done and shows mild left hydronephrosis with left ureteral stone-6mm. also with right kidney upper pole 1.7cm cyst.    consulted Urology. discussed with Dr. Walton. patient may need ureteral stent.     ekg personally reviewed. NST at 81bpm. no acute changes noted.     medically optimized at this time for OR.     pt has sulfa allergy. will add finasteride instead of flomax.    continue abx and IVFs    all questions answered at bedside.     see H&P for rest of plan of care.

## 2025-06-22 NOTE — H&P ADULT - NSHPPHYSICALEXAM_GEN_ALL_CORE
Vital Signs (24 Hrs):  T(C): 37.1 (06-21-25 @ 21:42), Max: 39.1 (06-21-25 @ 19:00)  HR: 63 (06-22-25 @ 00:00) (63 - 108)  BP: 141/77 (06-22-25 @ 00:00) (125/60 - 143/82)  RR: 25 (06-22-25 @ 00:00) (16 - 25)  SpO2: 95% (06-22-25 @ 00:00) (94% - 100%)  Wt(kg): --  Daily Height in cm: 180.34 (21 Jun 2025 19:00)    Daily     I&O's Summary

## 2025-06-22 NOTE — BRIEF OPERATIVE NOTE - NSICDXBRIEFPOSTOP_GEN_ALL_CORE_FT
POST-OP DIAGNOSIS:  Hydronephrosis of left kidney 22-Jun-2025 20:15:12  Carine Walton  Left ureteral stone 22-Jun-2025 20:15:21  Carine Walton  Acute UTI 22-Jun-2025 20:15:26  Carine Walton

## 2025-06-22 NOTE — H&P ADULT - NSVTERISKASSESS_GEN_ALL_CORE FT
Medical Assessment Completed on: 22-Jun-2025 01:10 Medical Assessment Completed on: 22-Jun-2025 02:45

## 2025-06-22 NOTE — CONSULT NOTE ADULT - ASSESSMENT
74y male with left distal ureteral stone with Fevers leukocytosis  Risks benefits alternatives explaining. Including bladder / urethral injury, Bleeding infection, pain, burning with urination/ possible urine retention requiring a catheter. All questions answered. Patient agrees to proceed to left stent.  NPO

## 2025-06-23 LAB
ANION GAP SERPL CALC-SCNC: 6 MMOL/L — SIGNIFICANT CHANGE UP (ref 5–17)
BASOPHILS # BLD AUTO: 0.01 K/UL — SIGNIFICANT CHANGE UP (ref 0–0.2)
BASOPHILS NFR BLD AUTO: 0.1 % — SIGNIFICANT CHANGE UP (ref 0–2)
BUN SERPL-MCNC: 18 MG/DL — SIGNIFICANT CHANGE UP (ref 7–23)
CALCIUM SERPL-MCNC: 8.8 MG/DL — SIGNIFICANT CHANGE UP (ref 8.4–10.5)
CHLORIDE SERPL-SCNC: 107 MMOL/L — SIGNIFICANT CHANGE UP (ref 96–108)
CO2 SERPL-SCNC: 29 MMOL/L — SIGNIFICANT CHANGE UP (ref 22–31)
CREAT SERPL-MCNC: 0.98 MG/DL — SIGNIFICANT CHANGE UP (ref 0.5–1.3)
EGFR: 80 ML/MIN/1.73M2 — SIGNIFICANT CHANGE UP
EGFR: 80 ML/MIN/1.73M2 — SIGNIFICANT CHANGE UP
EOSINOPHIL # BLD AUTO: 0 K/UL — SIGNIFICANT CHANGE UP (ref 0–0.5)
EOSINOPHIL NFR BLD AUTO: 0 % — SIGNIFICANT CHANGE UP (ref 0–6)
GLUCOSE SERPL-MCNC: 189 MG/DL — HIGH (ref 70–99)
HCT VFR BLD CALC: 39.2 % — SIGNIFICANT CHANGE UP (ref 39–50)
HGB BLD-MCNC: 12.6 G/DL — LOW (ref 13–17)
IMM GRANULOCYTES NFR BLD AUTO: 0.5 % — SIGNIFICANT CHANGE UP (ref 0–0.9)
LYMPHOCYTES # BLD AUTO: 0.95 K/UL — LOW (ref 1–3.3)
LYMPHOCYTES # BLD AUTO: 9.9 % — LOW (ref 13–44)
MCHC RBC-ENTMCNC: 29 PG — SIGNIFICANT CHANGE UP (ref 27–34)
MCHC RBC-ENTMCNC: 32.1 G/DL — SIGNIFICANT CHANGE UP (ref 32–36)
MCV RBC AUTO: 90.3 FL — SIGNIFICANT CHANGE UP (ref 80–100)
MONOCYTES # BLD AUTO: 0.61 K/UL — SIGNIFICANT CHANGE UP (ref 0–0.9)
MONOCYTES NFR BLD AUTO: 6.3 % — SIGNIFICANT CHANGE UP (ref 2–14)
NEUTROPHILS # BLD AUTO: 8.01 K/UL — HIGH (ref 1.8–7.4)
NEUTROPHILS NFR BLD AUTO: 83.2 % — HIGH (ref 43–77)
NRBC BLD AUTO-RTO: 0 /100 WBCS — SIGNIFICANT CHANGE UP (ref 0–0)
PLATELET # BLD AUTO: 137 K/UL — LOW (ref 150–400)
POTASSIUM SERPL-MCNC: 4.7 MMOL/L — SIGNIFICANT CHANGE UP (ref 3.5–5.3)
POTASSIUM SERPL-SCNC: 4.7 MMOL/L — SIGNIFICANT CHANGE UP (ref 3.5–5.3)
RBC # BLD: 4.34 M/UL — SIGNIFICANT CHANGE UP (ref 4.2–5.8)
RBC # FLD: 12.8 % — SIGNIFICANT CHANGE UP (ref 10.3–14.5)
SODIUM SERPL-SCNC: 142 MMOL/L — SIGNIFICANT CHANGE UP (ref 135–145)
WBC # BLD: 9.63 K/UL — SIGNIFICANT CHANGE UP (ref 3.8–10.5)
WBC # FLD AUTO: 9.63 K/UL — SIGNIFICANT CHANGE UP (ref 3.8–10.5)

## 2025-06-23 PROCEDURE — 99231 SBSQ HOSP IP/OBS SF/LOW 25: CPT

## 2025-06-23 PROCEDURE — 99233 SBSQ HOSP IP/OBS HIGH 50: CPT

## 2025-06-23 RX ORDER — HEPARIN SODIUM 1000 [USP'U]/ML
5000 INJECTION INTRAVENOUS; SUBCUTANEOUS EVERY 8 HOURS
Refills: 0 | Status: DISCONTINUED | OUTPATIENT
Start: 2025-06-23 | End: 2025-06-25

## 2025-06-23 RX ORDER — ACETAMINOPHEN 500 MG/5ML
650 LIQUID (ML) ORAL EVERY 6 HOURS
Refills: 0 | Status: DISCONTINUED | OUTPATIENT
Start: 2025-06-23 | End: 2025-06-25

## 2025-06-23 RX ORDER — ATORVASTATIN CALCIUM 80 MG/1
20 TABLET, FILM COATED ORAL AT BEDTIME
Refills: 0 | Status: DISCONTINUED | OUTPATIENT
Start: 2025-06-23 | End: 2025-06-25

## 2025-06-23 RX ORDER — AMLODIPINE BESYLATE 10 MG/1
5 TABLET ORAL DAILY
Refills: 0 | Status: DISCONTINUED | OUTPATIENT
Start: 2025-06-23 | End: 2025-06-25

## 2025-06-23 RX ADMIN — ATORVASTATIN CALCIUM 20 MILLIGRAM(S): 80 TABLET, FILM COATED ORAL at 21:28

## 2025-06-23 RX ADMIN — TRAMADOL HYDROCHLORIDE 50 MILLIGRAM(S): 50 TABLET, FILM COATED ORAL at 05:46

## 2025-06-23 RX ADMIN — CEFTRIAXONE 100 MILLIGRAM(S): 500 INJECTION, POWDER, FOR SOLUTION INTRAMUSCULAR; INTRAVENOUS at 21:28

## 2025-06-23 RX ADMIN — HEPARIN SODIUM 5000 UNIT(S): 1000 INJECTION INTRAVENOUS; SUBCUTANEOUS at 13:11

## 2025-06-23 RX ADMIN — DOXAZOSIN MESYLATE 4 MILLIGRAM(S): 8 TABLET ORAL at 21:27

## 2025-06-23 RX ADMIN — Medication 650 MILLIGRAM(S): at 21:52

## 2025-06-23 RX ADMIN — Medication 300 MILLIGRAM(S): at 12:15

## 2025-06-23 RX ADMIN — Medication 650 MILLIGRAM(S): at 13:10

## 2025-06-23 RX ADMIN — HEPARIN SODIUM 5000 UNIT(S): 1000 INJECTION INTRAVENOUS; SUBCUTANEOUS at 21:27

## 2025-06-23 RX ADMIN — Medication 3 MILLIGRAM(S): at 21:28

## 2025-06-23 RX ADMIN — Medication 650 MILLIGRAM(S): at 22:50

## 2025-06-23 RX ADMIN — Medication 650 MILLIGRAM(S): at 13:40

## 2025-06-23 NOTE — PROGRESS NOTE ADULT - ASSESSMENT
74 year old male with sepsis secondary to UTI    #Sepsis POA  2/2 UTI and obstructive uropathy due to stone  s/p stent placement on 6/22  c/w ceftriaxone  f/u blood and urine cx   input appreciated     #Hypertension-   c/w Amlodipine    #Gout  not active  c/w Allopurinol, Colchicine    #Chronic back pain -   analgesics as needed, cont Lyrica    #Hyperlipidemia   c/w Atorvastatin    DVT ppx  HSQ

## 2025-06-23 NOTE — PROGRESS NOTE ADULT - ASSESSMENT
75 yo m with left ureteral stone and sepsis s/p stent placement    bcx ngtd  left kid cx sent and rec   cont abx  trend fever curve   obtain post void residual to ensure complete bladder emptying     pacs images reviewed   HIE reviewed no past available ucx

## 2025-06-24 LAB
ANION GAP SERPL CALC-SCNC: 10 MMOL/L — SIGNIFICANT CHANGE UP (ref 5–17)
BASOPHILS # BLD AUTO: 0.04 K/UL — SIGNIFICANT CHANGE UP (ref 0–0.2)
BASOPHILS NFR BLD AUTO: 0.4 % — SIGNIFICANT CHANGE UP (ref 0–2)
BLANDM BLD POS QL PROBE: SIGNIFICANT CHANGE UP
BUN SERPL-MCNC: 14 MG/DL — SIGNIFICANT CHANGE UP (ref 7–23)
CALCIUM SERPL-MCNC: 8.4 MG/DL — SIGNIFICANT CHANGE UP (ref 8.4–10.5)
CHLORIDE SERPL-SCNC: 105 MMOL/L — SIGNIFICANT CHANGE UP (ref 96–108)
CO2 SERPL-SCNC: 27 MMOL/L — SIGNIFICANT CHANGE UP (ref 22–31)
CREAT SERPL-MCNC: 0.84 MG/DL — SIGNIFICANT CHANGE UP (ref 0.5–1.3)
EGFR: 92 ML/MIN/1.73M2 — SIGNIFICANT CHANGE UP
EGFR: 92 ML/MIN/1.73M2 — SIGNIFICANT CHANGE UP
EOSINOPHIL # BLD AUTO: 0.18 K/UL — SIGNIFICANT CHANGE UP (ref 0–0.5)
EOSINOPHIL NFR BLD AUTO: 1.8 % — SIGNIFICANT CHANGE UP (ref 0–6)
GLUCOSE SERPL-MCNC: 183 MG/DL — HIGH (ref 70–99)
HCT VFR BLD CALC: 38.8 % — LOW (ref 39–50)
HGB BLD-MCNC: 12.5 G/DL — LOW (ref 13–17)
IMM GRANULOCYTES NFR BLD AUTO: 0.5 % — SIGNIFICANT CHANGE UP (ref 0–0.9)
LYMPHOCYTES # BLD AUTO: 1.56 K/UL — SIGNIFICANT CHANGE UP (ref 1–3.3)
LYMPHOCYTES # BLD AUTO: 15.2 % — SIGNIFICANT CHANGE UP (ref 13–44)
MCHC RBC-ENTMCNC: 29.3 PG — SIGNIFICANT CHANGE UP (ref 27–34)
MCHC RBC-ENTMCNC: 32.2 G/DL — SIGNIFICANT CHANGE UP (ref 32–36)
MCV RBC AUTO: 90.9 FL — SIGNIFICANT CHANGE UP (ref 80–100)
METHOD TYPE: SIGNIFICANT CHANGE UP
MONOCYTES # BLD AUTO: 0.71 K/UL — SIGNIFICANT CHANGE UP (ref 0–0.9)
MONOCYTES NFR BLD AUTO: 6.9 % — SIGNIFICANT CHANGE UP (ref 2–14)
NEUTROPHILS # BLD AUTO: 7.71 K/UL — HIGH (ref 1.8–7.4)
NEUTROPHILS NFR BLD AUTO: 75.2 % — SIGNIFICANT CHANGE UP (ref 43–77)
NRBC BLD AUTO-RTO: 0 /100 WBCS — SIGNIFICANT CHANGE UP (ref 0–0)
PLATELET # BLD AUTO: 152 K/UL — SIGNIFICANT CHANGE UP (ref 150–400)
POTASSIUM SERPL-MCNC: 2.9 MMOL/L — CRITICAL LOW (ref 3.5–5.3)
POTASSIUM SERPL-SCNC: 2.9 MMOL/L — CRITICAL LOW (ref 3.5–5.3)
RBC # BLD: 4.27 M/UL — SIGNIFICANT CHANGE UP (ref 4.2–5.8)
RBC # FLD: 13 % — SIGNIFICANT CHANGE UP (ref 10.3–14.5)
SODIUM SERPL-SCNC: 142 MMOL/L — SIGNIFICANT CHANGE UP (ref 135–145)
WBC # BLD: 10.25 K/UL — SIGNIFICANT CHANGE UP (ref 3.8–10.5)
WBC # FLD AUTO: 10.25 K/UL — SIGNIFICANT CHANGE UP (ref 3.8–10.5)

## 2025-06-24 PROCEDURE — 71045 X-RAY EXAM CHEST 1 VIEW: CPT

## 2025-06-24 PROCEDURE — 87086 URINE CULTURE/COLONY COUNT: CPT

## 2025-06-24 PROCEDURE — 87205 SMEAR GRAM STAIN: CPT

## 2025-06-24 PROCEDURE — 83605 ASSAY OF LACTIC ACID: CPT

## 2025-06-24 PROCEDURE — 87040 BLOOD CULTURE FOR BACTERIA: CPT

## 2025-06-24 PROCEDURE — 83735 ASSAY OF MAGNESIUM: CPT

## 2025-06-24 PROCEDURE — 85730 THROMBOPLASTIN TIME PARTIAL: CPT

## 2025-06-24 PROCEDURE — 93005 ELECTROCARDIOGRAM TRACING: CPT

## 2025-06-24 PROCEDURE — 74176 CT ABD & PELVIS W/O CONTRAST: CPT

## 2025-06-24 PROCEDURE — C2617: CPT

## 2025-06-24 PROCEDURE — 99233 SBSQ HOSP IP/OBS HIGH 50: CPT

## 2025-06-24 PROCEDURE — 36415 COLL VENOUS BLD VENIPUNCTURE: CPT

## 2025-06-24 PROCEDURE — C1769: CPT

## 2025-06-24 PROCEDURE — C1758: CPT

## 2025-06-24 PROCEDURE — 84484 ASSAY OF TROPONIN QUANT: CPT

## 2025-06-24 PROCEDURE — 85610 PROTHROMBIN TIME: CPT

## 2025-06-24 PROCEDURE — 87077 CULTURE AEROBIC IDENTIFY: CPT

## 2025-06-24 PROCEDURE — 85025 COMPLETE CBC W/AUTO DIFF WBC: CPT

## 2025-06-24 PROCEDURE — C9399: CPT

## 2025-06-24 PROCEDURE — 80053 COMPREHEN METABOLIC PANEL: CPT

## 2025-06-24 PROCEDURE — 0241U: CPT

## 2025-06-24 PROCEDURE — 80048 BASIC METABOLIC PNL TOTAL CA: CPT

## 2025-06-24 PROCEDURE — 97161 PT EVAL LOW COMPLEX 20 MIN: CPT

## 2025-06-24 PROCEDURE — 76000 FLUOROSCOPY <1 HR PHYS/QHP: CPT

## 2025-06-24 PROCEDURE — 87075 CULTR BACTERIA EXCEPT BLOOD: CPT

## 2025-06-24 PROCEDURE — 99231 SBSQ HOSP IP/OBS SF/LOW 25: CPT

## 2025-06-24 PROCEDURE — 81001 URINALYSIS AUTO W/SCOPE: CPT

## 2025-06-24 RX ORDER — TRAMADOL HYDROCHLORIDE 50 MG/1
50 TABLET, FILM COATED ORAL EVERY 6 HOURS
Refills: 0 | Status: DISCONTINUED | OUTPATIENT
Start: 2025-06-24 | End: 2025-06-25

## 2025-06-24 RX ADMIN — HEPARIN SODIUM 5000 UNIT(S): 1000 INJECTION INTRAVENOUS; SUBCUTANEOUS at 21:25

## 2025-06-24 RX ADMIN — TRAMADOL HYDROCHLORIDE 50 MILLIGRAM(S): 50 TABLET, FILM COATED ORAL at 14:41

## 2025-06-24 RX ADMIN — Medication 40 MILLIEQUIVALENT(S): at 11:22

## 2025-06-24 RX ADMIN — Medication 3 MILLIGRAM(S): at 21:25

## 2025-06-24 RX ADMIN — TRAMADOL HYDROCHLORIDE 50 MILLIGRAM(S): 50 TABLET, FILM COATED ORAL at 14:11

## 2025-06-24 RX ADMIN — Medication 300 MILLIGRAM(S): at 11:22

## 2025-06-24 RX ADMIN — Medication 40 MILLIEQUIVALENT(S): at 14:10

## 2025-06-24 RX ADMIN — DOXAZOSIN MESYLATE 4 MILLIGRAM(S): 8 TABLET ORAL at 21:25

## 2025-06-24 RX ADMIN — CEFTRIAXONE 100 MILLIGRAM(S): 500 INJECTION, POWDER, FOR SOLUTION INTRAMUSCULAR; INTRAVENOUS at 21:25

## 2025-06-24 RX ADMIN — HEPARIN SODIUM 5000 UNIT(S): 1000 INJECTION INTRAVENOUS; SUBCUTANEOUS at 06:09

## 2025-06-24 RX ADMIN — HEPARIN SODIUM 5000 UNIT(S): 1000 INJECTION INTRAVENOUS; SUBCUTANEOUS at 14:11

## 2025-06-24 RX ADMIN — AMLODIPINE BESYLATE 5 MILLIGRAM(S): 10 TABLET ORAL at 06:04

## 2025-06-24 RX ADMIN — TRAMADOL HYDROCHLORIDE 50 MILLIGRAM(S): 50 TABLET, FILM COATED ORAL at 21:15

## 2025-06-24 RX ADMIN — ATORVASTATIN CALCIUM 20 MILLIGRAM(S): 80 TABLET, FILM COATED ORAL at 21:25

## 2025-06-24 RX ADMIN — Medication 650 MILLIGRAM(S): at 11:23

## 2025-06-24 RX ADMIN — Medication 650 MILLIGRAM(S): at 11:53

## 2025-06-24 RX ADMIN — TRAMADOL HYDROCHLORIDE 50 MILLIGRAM(S): 50 TABLET, FILM COATED ORAL at 20:13

## 2025-06-24 NOTE — PROGRESS NOTE ADULT - ASSESSMENT
73 yo m with left ureteral stone and sepsis s/p stent placement    bcx ngtd  left kid cx sent ngtd  bladder cx with Klebsiella awaiting sensit.  cont abx ideally dc home with po abx total 14 day course   trend fever curve   obtain post void residual to ensure complete bladder emptying     pacs images reviewed   HIE reviewed no past available ucx

## 2025-06-24 NOTE — PROGRESS NOTE ADULT - ASSESSMENT
74 year old male with sepsis secondary to UTI    #Sepsis POA  #2/2 UTI and obstructive uropathy due to stone  s/p stent placement on 6/22  c/w ceftriaxone  BCx NGTD  UCx: klebsiella >100k. sensitivities pending  Urology recs noted  f/u am labs    #Hypertension-   c/w Amlodipine  monitor bp    #Gout  not active  c/w Allopurinol, Colchicine    #Chronic back pain -   analgesics as needed, cont Lyrica    #Hyperlipidemia   c/w Atorvastatin    DVT ppx  HSQ    f/u PT eval for dispo planning    updated at bedside plan of care. all questions answered 6/24 74 year old male with sepsis secondary to UTI    #Sepsis POA  #2/2 UTI and obstructive uropathy due to stone  s/p stent placement on 6/22  c/w ceftriaxone  BCx NGTD  UCx: klebsiella >100k. sensitivities pending  Urology recs noted  f/u am labs    #Hypertension-   c/w Amlodipine  monitor bp    #hypokalemia  repleted  monitor on am labs    #Gout  not active  c/w Allopurinol, Colchicine    #Chronic back pain -   analgesics as needed, cont Lyrica    #Hyperlipidemia   c/w Atorvastatin    DVT ppx  HSQ    f/u PT eval for dispo planning    updated at bedside plan of care. all questions answered 6/24 74 year old male with sepsis secondary to UTI    #Sepsis POA  #2/2 UTI and obstructive uropathy due to stone  s/p stent placement on 6/22  c/w ceftriaxone  BCx NGTD  UCx: klebsiella >100k. sensitivities pending  Urology recs noted  f/u am labs    #Hypertension-   c/w Amlodipine  monitor bp    #hypokalemia  repleted  monitor on am labs    #Gout  not active  c/w Allopurinol, Colchicine    #Chronic back pain -   analgesics as needed, cont Lyrica    #Hyperlipidemia   c/w Atorvastatin    DVT ppx  HSQ    PT eval recs Home PT    dispo: likely home tomorrow once UCx sensitivities result    updated at bedside plan of care. all questions answered 6/24

## 2025-06-24 NOTE — PHYSICAL THERAPY INITIAL EVALUATION ADULT - ACTIVE RANGE OF MOTION EXAMINATION, REHAB EVAL
endoscopy
bilateral upper extremity Active ROM was WFL (within functional limits)/bilateral  lower extremity Active ROM was WFL (within functional limits)

## 2025-06-24 NOTE — PHYSICAL THERAPY INITIAL EVALUATION ADULT - ADDITIONAL COMMENTS
Warm/Dry Pt lives Pt lives in a 2 story home with his wife who has PD, his daughter and a 24/7 aide. Pt has 3 PJ and a ramp, Pt also reports he has 12 steps to his 2nd floor and a chair lift. Pt was ambulating prior to admission with a RW and assist with his ADLs 2/2 a recent b/l achilles tears when at work. Pt is currently looking for a MD/surgeon for further intervention.

## 2025-06-24 NOTE — PHYSICAL THERAPY INITIAL EVALUATION ADULT - LEVEL OF INDEPENDENCE: SIT/SUPINE, REHAB EVAL
Patient Instructions  Implant of Loop Recorder         Important Medication Information:      12/25/2020 - Last day to take ELIQUIS prior to procedure        · HOLD (do NOT take) ELIQUIS 2 days prior to your procedure.  Your last dose should be taken on 12/25/2020.  · You may take your other usual morning medications on the day of your procedure.         12/28/2020 at 1:00 pm - Loop implant     [facility]   [address] [place] [address]    Please report to Cardiology Waiting Room on the 3rd floor of the Hospital,    · Wash your chest with HIBICLENS OR AN ANTIBACTERIAL SOAP (such as Dial) on the night before and the morning of your procedure.  · Please follow above medication instructions.   · You do not need to fast for this procedure since only a local anesthesia (numbing) will be used.    · You will be going home after your procedure.   · You may want someone to drive you home from the hospital.   · All patients/visitors are asked to arrive with a mask and keep the mask on throughout their visit  · All persons entering will be screened for fever and respiratory symptoms  · Patients may have one adult support person pre and post procedure  · Support person may remain with the patient prior to their procedure and then must wait in a socially distant manner until a member of the medical team provides an update at the conclusion of the procedure  · If you are spending the night, your support person must follow the visitation hours (8am-6pm)  · Your support person should provide the staff with a valid cell phone number so that he or she can receive automated updates        Directions to Cardiology Waiting Room  If you park in the Parking Garage:  Take elevators to the 2nd floor  Walk up ramp and turn right by Gold Elevators  Take elevator to the 3rd floor  Upon exiting the elevator, turn away from the clinic areas  Walk long porter around to front of hospital to area with windows overlooking  [facility]  Check in at Reception Desk  OR  If family is dropping you off:  Have them drop you off at the front of the Hospital  (Near the ER, where all the flags are hung).  Take the E elevators to the 3rd floor.  Check in at the Reception Desk in the waiting room.      Post-Procedure Patient Discharge Instructions  Loop Recorders    Wound Care   If you are discharged with a standard dressing over the incision, you may remove the dressing after 24 hours.    If there are Steri-strips (strips of tape) over your incision, leave them on until your follow-up appointment. They may begin to fall off on their own, which is normal. If there is Dermabond (clear glue) over your incision, do not scrub it off. It acts as a barrier and will eventually disappear.   You may be discharged with 5 days of oral antibiotics. Please take the full prescription until it is gone.   Do not get the incision wet for 48 hours following the procedure. You may sponge bath during this period, working around the incision. After 48 hours, you may shower, but you should still try to keep this area as dry as possible, and avoid direct water contact to the incision (allow the water to hit back of your shoulder rather than directly on the incision). Gently pat the incision dry if it does get wet.   You may take regular showers after 2 weeks, unless otherwise indicated at your follow-up visit.   Do not submerge the incision in a tub, pool, or body of water for 6 weeks.   If you notice unusual swelling, redness, drainage, have more device site pain, chest pain, shortness of breath, or have a fever greater than 100 degrees, call our device clinic immediately: [phone] or [phone] during normal office hours. You may call [phone] after-hours or on weekends and ask for the electrophysiologist on call.  Activity    If you were driving prior to the procedure, you may resume driving right after your procedure (as long you did  not receive any sedation). If you have a history of passing out or a history of certain arrhythmias, there may be driving restrictions unrelated to the procedure. Please clarify with your physician if this is the case.   Avoid rough contact at the device site for 6 weeks.   You may participate in sexual activity unless otherwise instructed.   You may return to work the follow day unless told otherwise by your provider.  Long-Term Instructions   Metal Detectors at Airports: Metal detectors at airports do not interfere with you loop recorder.   MRI: You may have an MRI with an implantable loop recorder. All that is required is that you send a transmission to download your information before and after you have your MRI.  Long-Term Follow-Up   Your device has the ability to transmit device information from home to the doctors office using a home monitoring system.   This remote system takes the place of a doctors visit. Your device will be checked from home every 31 days. Every 12 months, you will be asked to come into the office for an in-office check.   Your device should last in the range of 3 years.       Any need to reschedule or cancel procedures, or any questions regarding your procedures should be addressed directly with the Arrhythmia Department Nurses at the following phone number: [phone].           supervision

## 2025-06-24 NOTE — PHYSICAL THERAPY INITIAL EVALUATION ADULT - PERTINENT HX OF CURRENT PROBLEM, REHAB EVAL
Mr Enrrique is a 74 year old male brought in by his daughter from home because of  headache, neck, back pain, right flank pain, weakness, fatigue and nausea, found to have fever of 102.7.   Pt has history of hypertension, hyperlipidemia, chronic diastolic CHF, hx of CAD, BPH, AAA, GERD, Gout, arthritis, chronic back pain,  He denies, chest pain, cough, abdominal pain, vomiting, diarrhea.  Denies sick contacts.  Pt reports he had an accident at work within the last 2 months and sustained b/l ankle achilles tendon ruptures. Pt reports he is currently looking for a MD/Surgeon for further intervention.

## 2025-06-24 NOTE — PROGRESS NOTE ADULT - SUBJECTIVE AND OBJECTIVE BOX
Subjective  no overnight events; feeling well min dysuria  Objective    Vital signs  T(F): , Max: 101.3 (06-22-25 @ 20:13)  HR: 57 (06-23-25 @ 05:52)  BP: 155/75 (06-23-25 @ 05:52)  SpO2: 94% (06-23-25 @ 05:52)  Wt(kg): --    Output     06-22 @ 07:01  -  06-23 @ 07:00  --------------------------------------------------------  IN: 1370 mL / OUT: 300 mL / NET: 1070 mL        Gen awake alert nad axox3 nontoxic appearing  Abd soft ntnd  Back no cvat bl   nonpalp bladder    Labs                            12.6   9.63  )-----------( 137      ( 23 Jun 2025 06:16 )             39.2     06-23    142  |  107  |  18  ----------------------------<  189[H]  4.7   |  29  |  0.98    Ca    8.8      23 Jun 2025 06:16  Mg     1.6     06-22    TPro  7.2  /  Alb  3.5  /  TBili  0.8  /  DBili  x   /  AST  26  /  ALT  24  /  AlkPhos  82  06-21 06-22 @ 05:40    WBC 13.02 / Hct 39.0  / SCr 1.09       left kid cx sent  bcx ngtd   ucx sent rec    Imaging  < from: CT Abdomen and Pelvis No Cont (06.22.25 @ 12:07) >  FINDINGS:  LOWER CHEST: No acute abnormality.    LIVER: Within normal limits.  BILE DUCTS: Normal caliber.  GALLBLADDER: Within normal limits.  SPLEEN: Within normal limits.  PANCREAS: Within normal limits.  ADRENALS: Within normal limits.  KIDNEYS/URETERS: 1.7 cm probable hyperdense cyst upper pole right kidney.   No right hydronephrosis or nephrolithiasis. Mild left hydronephrosis   caused by a 0.6 cm distal ureteral calculus, about 3 cm proximal to the   ureterovesical junction.    BLADDER: Within normal limits.  REPRODUCTIVE ORGANS: No prostatic enlargement.    BOWEL: No bowel obstruction. Appendix is not visualized. Colonic   diverticulosis.  PERITONEUM/RETROPERITONEUM: Within normal limits.  VESSELS: Atherosclerotic changes. No abdominal aortic aneurysm.  LYMPH NODES: No lymphadenopathy.  ABDOMINAL WALL: Postoperative changes.  BONES:Extensive thoracolumbar posterior fusion and disc spacer placement.    IMPRESSION:    Left hydronephrosis caused by a 6 mm distal ureteral calculus.      < end of copied text >  
CC: Patient is a 74y old  Male who presents with a chief complaint of fever (23 Jun 2025 11:47)      Interval History:    Patient seen and examined at bedside.  No overnight events.  No new complaints.    ALLERGIES:  sulfa drugs (Rash)  penicillin (Unknown)    MEDICATIONS  (STANDING):  allopurinol 300 milliGRAM(s) Oral daily  amLODIPine   Tablet 5 milliGRAM(s) Oral daily  atorvastatin 20 milliGRAM(s) Oral at bedtime  cefTRIAXone   IVPB 2000 milliGRAM(s) IV Intermittent every 24 hours  doxazosin 4 milliGRAM(s) Oral at bedtime  heparin   Injectable 5000 Unit(s) SubCutaneous every 8 hours  melatonin 3 milliGRAM(s) Oral at bedtime  potassium chloride    Tablet ER 40 milliEquivalent(s) Oral every 4 hours    MEDICATIONS  (PRN):  acetaminophen     Tablet .. 650 milliGRAM(s) Oral every 6 hours PRN Temp greater or equal to 38C (100.4F), Mild Pain (1 - 3)    Vital Signs Last 24 Hrs  T(F): 98 (24 Jun 2025 10:59), Max: 98 (24 Jun 2025 05:00)  HR: 104 (24 Jun 2025 10:59) (60 - 104)  BP: 143/82 (24 Jun 2025 10:59) (143/82 - 165/78)  RR: 16 (24 Jun 2025 10:59) (16 - 16)  SpO2: 96% (24 Jun 2025 10:59) (92% - 96%)  I&O's Summary    23 Jun 2025 07:01  -  24 Jun 2025 07:00  --------------------------------------------------------  IN: 1630 mL / OUT: 1400 mL / NET: 230 mL      BMI (kg/m2): 34.9 (06-22-25 @ 19:09)    PHYSICAL EXAM:  GENERAL: NAD  CHEST/LUNG: No rales, rhonchi, wheezing; normal respiratory effort  HEART: RRR; No murmurs, rubs, or gallops  ABDOMEN: Soft, Nontender, Nondistended; Bowel sounds present  MSK/EXT:  No peripheral edema, 2+ Peripheral Pulses, No clubbing or digital cyanosis  PSYCH: Appropriate affect, Alert & Oriented    LABS:                        12.5   10.25 )-----------( 152      ( 24 Jun 2025 10:00 )             38.8       06-24    142  |  105  |  14  ----------------------------<  183  2.9   |  27  |  0.84    Ca    8.4      24 Jun 2025 10:00  Mg     1.6     06-22    TPro  7.2  /  Alb  3.5  /  TBili  0.8  /  DBili  x   /  AST  26  /  ALT  24  /  AlkPhos  82  06-21       PT/INR - ( 21 Jun 2025 19:31 )   PT: 15.3 sec;   INR: 1.30 ratio         PTT - ( 21 Jun 2025 19:31 )  PTT:32.0 sec   Lactate, Blood: 1.4 mmol/L (06-21 @ 22:35)  Lactate, Blood: 2.1 mmol/L (06-21 @ 19:31)    CARDIAC MARKERS ( 21 Jun 2025 19:31 )  x     / 8.7 ng/L / x     / x     / x                            Urinalysis Basic - ( 24 Jun 2025 10:00 )    Color: x / Appearance: x / SG: x / pH: x  Gluc: 183 mg/dL / Ketone: x  / Bili: x / Urobili: x   Blood: x / Protein: x / Nitrite: x   Leuk Esterase: x / RBC: x / WBC x   Sq Epi: x / Non Sq Epi: x / Bacteria: x        Culture - Urine (collected 22 Jun 2025 19:45)  Source: OR Collect left renal culture  Preliminary Report (24 Jun 2025 07:41):    No growth to date.    Culture - Urine (collected 21 Jun 2025 23:04)  Source: Clean Catch None  Preliminary Report (23 Jun 2025 22:48):    >100,000 CFU/ml Klebsiella aerogenes (Previously Enterobacter)    Culture - Blood (collected 21 Jun 2025 19:36)  Source: Blood Blood-Peripheral  Preliminary Report (24 Jun 2025 02:02):    No growth at 48 Hours    Culture - Blood (collected 21 Jun 2025 19:31)  Source: Blood Blood-Peripheral  Preliminary Report (24 Jun 2025 02:02):    No growth at 48 Hours          Care Discussed with Consultants/Other Providers: Yes
Patient is a 74y old  Male who presents with a chief complaint of fever (23 Jun 2025 07:32)    SUBJECTIVE / OVERNIGHT EVENTS: Patient seen and examined. Feels well. NO abdominal pain. Urinating. No hematuria today    MEDICATIONS  (STANDING):  allopurinol 300 milliGRAM(s) Oral daily  cefTRIAXone   IVPB 2000 milliGRAM(s) IV Intermittent every 24 hours  doxazosin 4 milliGRAM(s) Oral at bedtime  lactated ringers. 1000 milliLiter(s) (75 mL/Hr) IV Continuous <Continuous>  melatonin 3 milliGRAM(s) Oral at bedtime    MEDICATIONS  (PRN):      Vital Signs Last 24 Hrs  T(C): 36.6 (23 Jun 2025 11:46), Max: 38.5 (22 Jun 2025 20:13)  T(F): 97.8 (23 Jun 2025 11:46), Max: 101.3 (22 Jun 2025 20:13)  HR: 62 (23 Jun 2025 11:46) (57 - 64)  BP: 136/71 (23 Jun 2025 11:46) (115/64 - 155/75)  BP(mean): 81 (22 Jun 2025 20:23) (81 - 83)  RR: 17 (23 Jun 2025 11:46) (17 - 23)  SpO2: 95% (23 Jun 2025 11:46) (90% - 97%)    Parameters below as of 23 Jun 2025 11:46  Patient On (Oxygen Delivery Method): room air    PHYSICAL EXAM:  GENERAL: NAD, well-developed  HEAD:  Atraumatic, Normocephalic  EYES: EOMI, PERRLA, conjunctiva and sclera clear  NECK: Supple, No JVD  CHEST/LUNG: Clear to auscultation bilaterally; No wheeze  HEART: Regular rate and rhythm; No murmurs, rubs, or gallops  ABDOMEN: Soft, Nontender, Nondistended; Bowel sounds present  EXTREMITIES:  2+ Peripheral Pulses, No clubbing, cyanosis, or edema  PSYCH: AAOx3  NEUROLOGY: non-focal  SKIN: No rashes or lesions    LABS:                        12.6   9.63  )-----------( 137      ( 23 Jun 2025 06:16 )             39.2     06-23    142  |  107  |  18  ----------------------------<  189[H]  4.7   |  29  |  0.98    Ca    8.8      23 Jun 2025 06:16  Mg     1.6     06-22    TPro  7.2  /  Alb  3.5  /  TBili  0.8  /  DBili  x   /  AST  26  /  ALT  24  /  AlkPhos  82  06-21    PT/INR - ( 21 Jun 2025 19:31 )   PT: 15.3 sec;   INR: 1.30 ratio         PTT - ( 21 Jun 2025 19:31 )  PTT:32.0 sec      Urinalysis Basic - ( 23 Jun 2025 06:16 )    Color: x / Appearance: x / SG: x / pH: x  Gluc: 189 mg/dL / Ketone: x  / Bili: x / Urobili: x   Blood: x / Protein: x / Nitrite: x   Leuk Esterase: x / RBC: x / WBC x   Sq Epi: x / Non Sq Epi: x / Bacteria: x        RADIOLOGY & ADDITIONAL TESTS:    Imaging Personally Reviewed:    Consultant(s) Notes Reviewed:  urology - f/u culture    Care Discussed with Consultants/Other Providers:    Assessment and Plan:
Subjective  feeling well without complaints   Objective    Vital signs  T(F): , Max: 98 (06-24-25 @ 05:00)  HR: 104 (06-24-25 @ 10:59)  BP: 143/82 (06-24-25 @ 10:59)  SpO2: 96% (06-24-25 @ 10:59)  Wt(kg): --    Output     06-23 @ 07:01  -  06-24 @ 07:00  --------------------------------------------------------  IN: 1630 mL / OUT: 1400 mL / NET: 230 mL        Gen awake alert nad axox3  Abd soft ntnd   Back no cvat bl    nonpalp bladder voided urine concentrated yellow     Labs                            12.5   10.25 )-----------( 152      ( 24 Jun 2025 10:00 )             38.8     06-24    142  |  105  |  14  ----------------------------<  183[H]  2.9[LL]   |  27  |  0.84    Ca    8.4      24 Jun 2025 10:00        06-23 @ 06:16    WBC 9.63  / Hct 39.2  / SCr 0.98       Urine Cx: Culture - Urine (06.22.25 @ 19:45)    Specimen Source: OR Collect left renal culture   Culture Results:   No growth to date.    Culture - Urine (06.21.25 @ 23:04)    Specimen Source: Clean Catch None   Culture Results:   >100,000 CFU/ml Klebsiella aerogenes (Previously Enterobacter)        Culture - Blood (06.21.25 @ 19:36)    Specimen Source: Blood Blood-Peripheral   Culture Results:   No growth at 48 Hours      Imaging  < from: CT Abdomen and Pelvis No Cont (06.22.25 @ 12:07) >  FINDINGS:  LOWER CHEST: No acute abnormality.    LIVER: Within normal limits.  BILE DUCTS: Normal caliber.  GALLBLADDER: Within normal limits.  SPLEEN: Within normal limits.  PANCREAS: Within normal limits.  ADRENALS: Within normal limits.  KIDNEYS/URETERS: 1.7 cm probable hyperdense cyst upper pole right kidney.   No right hydronephrosis or nephrolithiasis. Mild left hydronephrosis   caused by a 0.6 cm distal ureteral calculus, about 3 cm proximal to the   ureterovesical junction.    BLADDER: Within normal limits.  REPRODUCTIVE ORGANS: No prostatic enlargement.    BOWEL: No bowel obstruction. Appendix is not visualized. Colonic   diverticulosis.  PERITONEUM/RETROPERITONEUM: Within normal limits.  VESSELS: Atherosclerotic changes. No abdominal aortic aneurysm.  LYMPH NODES: No lymphadenopathy.  ABDOMINAL WALL: Postoperative changes.  BONES:Extensive thoracolumbar posterior fusion and disc spacer placement.    IMPRESSION:    Left hydronephrosis caused by a 6 mm distal ureteral calculus.    --- End of Report ---    < end of copied text >

## 2025-06-25 ENCOUNTER — TRANSCRIPTION ENCOUNTER (OUTPATIENT)
Age: 75
End: 2025-06-25

## 2025-06-25 VITALS
OXYGEN SATURATION: 94 % | HEART RATE: 61 BPM | DIASTOLIC BLOOD PRESSURE: 87 MMHG | SYSTOLIC BLOOD PRESSURE: 163 MMHG | TEMPERATURE: 98 F | RESPIRATION RATE: 18 BRPM

## 2025-06-25 LAB
-  AMOXICILLIN/CLAVULANIC ACID: SIGNIFICANT CHANGE UP
-  AMPICILLIN/SULBACTAM: SIGNIFICANT CHANGE UP
-  AMPICILLIN: SIGNIFICANT CHANGE UP
-  AZTREONAM: SIGNIFICANT CHANGE UP
-  CEFAZOLIN: SIGNIFICANT CHANGE UP
-  CEFEPIME: SIGNIFICANT CHANGE UP
-  CEFOXITIN: SIGNIFICANT CHANGE UP
-  CEFTRIAXONE: SIGNIFICANT CHANGE UP
-  CIPROFLOXACIN: SIGNIFICANT CHANGE UP
-  ERTAPENEM: SIGNIFICANT CHANGE UP
-  GENTAMICIN: SIGNIFICANT CHANGE UP
-  IMIPENEM: SIGNIFICANT CHANGE UP
-  LEVOFLOXACIN: SIGNIFICANT CHANGE UP
-  MEROPENEM: SIGNIFICANT CHANGE UP
-  NITROFURANTOIN: SIGNIFICANT CHANGE UP
-  PIPERACILLIN/TAZOBACTAM: SIGNIFICANT CHANGE UP
-  TIGECYCLINE: SIGNIFICANT CHANGE UP
-  TOBRAMYCIN: SIGNIFICANT CHANGE UP
-  TRIMETHOPRIM/SULFAMETHOXAZOLE: SIGNIFICANT CHANGE UP
ANION GAP SERPL CALC-SCNC: 8 MMOL/L — SIGNIFICANT CHANGE UP (ref 5–17)
BASOPHILS # BLD AUTO: 0.04 K/UL — SIGNIFICANT CHANGE UP (ref 0–0.2)
BASOPHILS NFR BLD AUTO: 0.5 % — SIGNIFICANT CHANGE UP (ref 0–2)
BUN SERPL-MCNC: 12 MG/DL — SIGNIFICANT CHANGE UP (ref 7–23)
CALCIUM SERPL-MCNC: 8.9 MG/DL — SIGNIFICANT CHANGE UP (ref 8.4–10.5)
CHLORIDE SERPL-SCNC: 104 MMOL/L — SIGNIFICANT CHANGE UP (ref 96–108)
CO2 SERPL-SCNC: 28 MMOL/L — SIGNIFICANT CHANGE UP (ref 22–31)
CREAT SERPL-MCNC: 0.83 MG/DL — SIGNIFICANT CHANGE UP (ref 0.5–1.3)
CULTURE RESULTS: ABNORMAL
EGFR: 92 ML/MIN/1.73M2 — SIGNIFICANT CHANGE UP
EGFR: 92 ML/MIN/1.73M2 — SIGNIFICANT CHANGE UP
EOSINOPHIL # BLD AUTO: 0.39 K/UL — SIGNIFICANT CHANGE UP (ref 0–0.5)
EOSINOPHIL NFR BLD AUTO: 4.7 % — SIGNIFICANT CHANGE UP (ref 0–6)
GLUCOSE SERPL-MCNC: 102 MG/DL — HIGH (ref 70–99)
HCT VFR BLD CALC: 40.6 % — SIGNIFICANT CHANGE UP (ref 39–50)
HGB BLD-MCNC: 13.3 G/DL — SIGNIFICANT CHANGE UP (ref 13–17)
IMM GRANULOCYTES NFR BLD AUTO: 0.4 % — SIGNIFICANT CHANGE UP (ref 0–0.9)
LYMPHOCYTES # BLD AUTO: 1.81 K/UL — SIGNIFICANT CHANGE UP (ref 1–3.3)
LYMPHOCYTES # BLD AUTO: 22 % — SIGNIFICANT CHANGE UP (ref 13–44)
MAGNESIUM SERPL-MCNC: 2 MG/DL — SIGNIFICANT CHANGE UP (ref 1.6–2.6)
MCHC RBC-ENTMCNC: 29 PG — SIGNIFICANT CHANGE UP (ref 27–34)
MCHC RBC-ENTMCNC: 32.8 G/DL — SIGNIFICANT CHANGE UP (ref 32–36)
MCV RBC AUTO: 88.6 FL — SIGNIFICANT CHANGE UP (ref 80–100)
METHOD TYPE: SIGNIFICANT CHANGE UP
MONOCYTES # BLD AUTO: 0.78 K/UL — SIGNIFICANT CHANGE UP (ref 0–0.9)
MONOCYTES NFR BLD AUTO: 9.5 % — SIGNIFICANT CHANGE UP (ref 2–14)
NEUTROPHILS # BLD AUTO: 5.18 K/UL — SIGNIFICANT CHANGE UP (ref 1.8–7.4)
NEUTROPHILS NFR BLD AUTO: 62.9 % — SIGNIFICANT CHANGE UP (ref 43–77)
NRBC BLD AUTO-RTO: 0 /100 WBCS — SIGNIFICANT CHANGE UP (ref 0–0)
ORGANISM # SPEC MICROSCOPIC CNT: ABNORMAL
ORGANISM # SPEC MICROSCOPIC CNT: ABNORMAL
ORGANISM # SPEC MICROSCOPIC CNT: SIGNIFICANT CHANGE UP
PLATELET # BLD AUTO: 176 K/UL — SIGNIFICANT CHANGE UP (ref 150–400)
POTASSIUM SERPL-MCNC: 3.3 MMOL/L — LOW (ref 3.5–5.3)
POTASSIUM SERPL-SCNC: 3.3 MMOL/L — LOW (ref 3.5–5.3)
RBC # BLD: 4.58 M/UL — SIGNIFICANT CHANGE UP (ref 4.2–5.8)
RBC # FLD: 12.8 % — SIGNIFICANT CHANGE UP (ref 10.3–14.5)
SODIUM SERPL-SCNC: 140 MMOL/L — SIGNIFICANT CHANGE UP (ref 135–145)
SPECIMEN SOURCE: SIGNIFICANT CHANGE UP
WBC # BLD: 8.23 K/UL — SIGNIFICANT CHANGE UP (ref 3.8–10.5)
WBC # FLD AUTO: 8.23 K/UL — SIGNIFICANT CHANGE UP (ref 3.8–10.5)

## 2025-06-25 PROCEDURE — C2617: CPT

## 2025-06-25 PROCEDURE — 80053 COMPREHEN METABOLIC PANEL: CPT

## 2025-06-25 PROCEDURE — C9399: CPT

## 2025-06-25 PROCEDURE — 96365 THER/PROPH/DIAG IV INF INIT: CPT

## 2025-06-25 PROCEDURE — 71045 X-RAY EXAM CHEST 1 VIEW: CPT

## 2025-06-25 PROCEDURE — 85730 THROMBOPLASTIN TIME PARTIAL: CPT

## 2025-06-25 PROCEDURE — 93005 ELECTROCARDIOGRAM TRACING: CPT

## 2025-06-25 PROCEDURE — 87637 SARSCOV2&INF A&B&RSV AMP PRB: CPT

## 2025-06-25 PROCEDURE — 0241U: CPT

## 2025-06-25 PROCEDURE — 80048 BASIC METABOLIC PNL TOTAL CA: CPT

## 2025-06-25 PROCEDURE — C1769: CPT

## 2025-06-25 PROCEDURE — 87040 BLOOD CULTURE FOR BACTERIA: CPT

## 2025-06-25 PROCEDURE — 85610 PROTHROMBIN TIME: CPT

## 2025-06-25 PROCEDURE — 83735 ASSAY OF MAGNESIUM: CPT

## 2025-06-25 PROCEDURE — 81001 URINALYSIS AUTO W/SCOPE: CPT

## 2025-06-25 PROCEDURE — 84484 ASSAY OF TROPONIN QUANT: CPT

## 2025-06-25 PROCEDURE — 87075 CULTR BACTERIA EXCEPT BLOOD: CPT

## 2025-06-25 PROCEDURE — 87186 SC STD MICRODIL/AGAR DIL: CPT

## 2025-06-25 PROCEDURE — 83605 ASSAY OF LACTIC ACID: CPT

## 2025-06-25 PROCEDURE — 99239 HOSP IP/OBS DSCHRG MGMT >30: CPT

## 2025-06-25 PROCEDURE — 76000 FLUOROSCOPY <1 HR PHYS/QHP: CPT

## 2025-06-25 PROCEDURE — 87077 CULTURE AEROBIC IDENTIFY: CPT

## 2025-06-25 PROCEDURE — 36415 COLL VENOUS BLD VENIPUNCTURE: CPT

## 2025-06-25 PROCEDURE — 85025 COMPLETE CBC W/AUTO DIFF WBC: CPT

## 2025-06-25 PROCEDURE — C1758: CPT

## 2025-06-25 PROCEDURE — 99285 EMERGENCY DEPT VISIT HI MDM: CPT | Mod: 25

## 2025-06-25 PROCEDURE — 74176 CT ABD & PELVIS W/O CONTRAST: CPT

## 2025-06-25 PROCEDURE — 87086 URINE CULTURE/COLONY COUNT: CPT

## 2025-06-25 PROCEDURE — 97161 PT EVAL LOW COMPLEX 20 MIN: CPT

## 2025-06-25 PROCEDURE — 87205 SMEAR GRAM STAIN: CPT

## 2025-06-25 RX ORDER — ACETAMINOPHEN 500 MG/5ML
2 LIQUID (ML) ORAL
Qty: 0 | Refills: 0 | DISCHARGE
Start: 2025-06-25

## 2025-06-25 RX ORDER — DOXAZOSIN MESYLATE 8 MG/1
1 TABLET ORAL
Qty: 30 | Refills: 0
Start: 2025-06-25 | End: 2025-07-24

## 2025-06-25 RX ORDER — LEVOFLOXACIN 25 MG/ML
1 SOLUTION ORAL
Qty: 13 | Refills: 0
Start: 2025-06-25

## 2025-06-25 RX ORDER — LEVOFLOXACIN 25 MG/ML
750 SOLUTION ORAL EVERY 24 HOURS
Refills: 0 | Status: DISCONTINUED | OUTPATIENT
Start: 2025-06-25 | End: 2025-06-25

## 2025-06-25 RX ADMIN — HEPARIN SODIUM 5000 UNIT(S): 1000 INJECTION INTRAVENOUS; SUBCUTANEOUS at 15:07

## 2025-06-25 RX ADMIN — HEPARIN SODIUM 5000 UNIT(S): 1000 INJECTION INTRAVENOUS; SUBCUTANEOUS at 05:56

## 2025-06-25 RX ADMIN — AMLODIPINE BESYLATE 5 MILLIGRAM(S): 10 TABLET ORAL at 05:55

## 2025-06-25 RX ADMIN — Medication 300 MILLIGRAM(S): at 15:07

## 2025-06-25 NOTE — CDI QUERY NOTE - NSCDIOTHERTXTBX_GEN_ALL_CORE_HH
Clinical documentation and/or evidence in the medical record indicates that this patient has a laboratory finding without an associated diagnosis. In order to ensure accurate coding and accuracy of the clinical record, the documentation in this patient’s medical record requires additional clarification.    Please include documentation of a diagnosis associated with these findings in your progress note and/or discharge  summary.    Please clarify if the patient was found to have one of the following:    · Lactic Acidosis  · Other (specify)    Supporting documentation and/or clinical evidence:    Lactate 6/21 2.1, 1.4    H&P “Initial lactate was 2.1, which nomalized after IV fluids and IV Ceftriaxone.”    Chart Note: 6/22 “#sepsis POA  #hydronephrosis  #ureteral stone  #UTI. wbc 13.96-->13.02. continue ceftriaxone. lactate normalized. monitor fever curve.”    DC Note: “Initial lactate was 2.1, which nomalized after IV fluids and IV Ceftriaxone.”    Treatment: 6/21 NS 2300 ML bolus, 6/22  ml bolus

## 2025-06-25 NOTE — DISCHARGE NOTE PROVIDER - NSDCMRMEDTOKEN_GEN_ALL_CORE_FT
allopurinol 300 mg oral tablet: 1 tab(s) orally once a day  Ambien 10 mg oral tablet: 1 tab(s) orally once a day (at bedtime)  amLODIPine 5 mg oral tablet: 1 tab(s) orally once a day  ascorbic acid 100 mg oral tablet: 1 tab(s) orally once a day  atorvastatin 20 mg oral tablet: 1 tab(s) orally once a day  cholecalciferol 50 mcg (2000 intl units) oral tablet: 1 tab(s) orally once a day  cyanocobalamin 100 mcg oral tablet: 1 tab(s) orally once a day  Lyrica 150 mg oral capsule: 1 cap(s) orally 3 times a day  Mitigare 0.6 mg oral capsule: 1 cap(s) orally once a day (Brand colchicine)  pantoprazole 40 mg oral delayed release tablet: 1 tab(s) orally once a day (before a meal) as directed  senna (sennosides) 8.6 mg oral tablet: 1 tab(s) orally once a day  traMADol 50 mg oral tablet: 1 tab(s) orally every 6 hours as needed for  pain pt states he takes 4/day   acetaminophen 325 mg oral tablet: 2 tab(s) orally every 6 hours As needed Temp greater or equal to 38C (100.4F), Mild Pain (1 - 3)  allopurinol 300 mg oral tablet: 1 tab(s) orally once a day  Ambien 10 mg oral tablet: 1 tab(s) orally once a day (at bedtime)  amLODIPine 5 mg oral tablet: 1 tab(s) orally once a day  ascorbic acid 100 mg oral tablet: 1 tab(s) orally once a day  atorvastatin 20 mg oral tablet: 1 tab(s) orally once a day  cholecalciferol 50 mcg (2000 intl units) oral tablet: 1 tab(s) orally once a day  cyanocobalamin 100 mcg oral tablet: 1 tab(s) orally once a day  doxazosin 4 mg oral tablet: 1 tab(s) orally once a day (at bedtime)  levoFLOXacin 750 mg oral tablet: 1 tab(s) orally every 24 hours  Lyrica 150 mg oral capsule: 1 cap(s) orally 3 times a day  Mitigare 0.6 mg oral capsule: 1 cap(s) orally once a day (Brand colchicine)  pantoprazole 40 mg oral delayed release tablet: 1 tab(s) orally once a day (before a meal) as directed  senna (sennosides) 8.6 mg oral tablet: 1 tab(s) orally once a day  traMADol 50 mg oral tablet: 1 tab(s) orally every 6 hours as needed for  pain pt states he takes 4/day

## 2025-06-25 NOTE — DISCHARGE NOTE NURSING/CASE MANAGEMENT/SOCIAL WORK - FINANCIAL ASSISTANCE
[facility] provides services at a reduced cost to those who are determined to be eligible through [facility]’s financial assistance program. Information regarding [facility]’s financial assistance program can be found by going to [email or web address] or by calling [phone].

## 2025-06-25 NOTE — DISCHARGE NOTE PROVIDER - CARE PROVIDERS DIRECT ADDRESSES
[email or web address]

## 2025-06-25 NOTE — DISCHARGE NOTE PROVIDER - TIME SPENT: (MINUTES SPENT ON THE DISCHARGE SERVICE)
[FreeTextEntry1] : Patient will start nuvaring\par patient consulted on various birth control option. She wishes to try OCP. No contraindication. Risks/benefits and alternatives discussed. Instruction sheet given. Discuss minimal increased risk of VTE with pill usage\par Will repeat sonogram in 2 months  50

## 2025-06-25 NOTE — DISCHARGE NOTE NURSING/CASE MANAGEMENT/SOCIAL WORK - PATIENT PORTAL LINK FT
You can access the FollowMyHealth Patient Portal offered by [facility] by registering at the following website: [email or web address]. By joining [email or web address]’s FollowMyHealth portal, you will also be able to view your health information using other applications (apps) compatible with our system.

## 2025-06-25 NOTE — DISCHARGE NOTE PROVIDER - NSDCCPCAREPLAN_GEN_ALL_CORE_FT
PRINCIPAL DISCHARGE DIAGNOSIS  Diagnosis: Sepsis secondary to UTI  Assessment and Plan of Treatment:      PRINCIPAL DISCHARGE DIAGNOSIS  Diagnosis: Sepsis secondary to UTI  Assessment and Plan of Treatment: you were found to have sepsis due to UTI. treated with IV rocephine. spoke to urology. discharging with levaquin once daily for 14 days. you got one tablet here. sent 13 tablet to pharmacy. start from tommorrow. do nto miss dose. drink plenty to keep yourself hydrated. see dhruv BUSTILLO and urologist in 1-2 weeks. return to ER if fever, flank or abdomina pain, urimary symptoms or any other concerning symptoms.      SECONDARY DISCHARGE DIAGNOSES  Diagnosis: Left ureteral stone  Assessment and Plan of Treatment: stent was placed on June 22. please new med doxazosin was started to help to pass stone. try to cath stone for analysis. call your urologist office if you are able to catch stone. see urologist in 1-2 weeks    Diagnosis: Hydronephrosis, left  Assessment and Plan of Treatment: left kidney was swallowen due to blockage due to stone. now stent in place to relieve the block. follow up with urologist.    Diagnosis: Lactate blood increase  Assessment and Plan of Treatment: initally lactic acid was slightly high due to infection. now normalized.    Diagnosis: Chronic diastolic congestive heart failure  Assessment and Plan of Treatment: chronic. stable. continue meds. see cardiology as scheduled    Diagnosis: CAD (coronary artery disease)  Assessment and Plan of Treatment: chronic. stable. continue meds. see cardiology as scheduled    Diagnosis: H/O abdominal aortic aneurysm repair  Assessment and Plan of Treatment:     Diagnosis: GERD (gastroesophageal reflux disease)  Assessment and Plan of Treatment:

## 2025-06-25 NOTE — DISCHARGE NOTE NURSING/CASE MANAGEMENT/SOCIAL WORK - NSDCPEFALRISK_GEN_ALL_CORE
For information on Fall & Injury Prevention, visit: [email or web address] OR  [email or web address] OR  https://www.cdc.gov/steadi/patient.html

## 2025-06-25 NOTE — DISCHARGE NOTE PROVIDER - HOSPITAL COURSE
Hospital Course  HPI:  Mr Osuna is a 74 year old male brought in by his daughter from home because of  headache, neck, back pain, right flank pain, weakness, fatigue and nausea, found to have fever of 102.7.   Pt has history of hypertension, hyperlipidemia, chronic diastolic CHF, hx of CAD, BPH, AAA, GERD, Gout, arthritis, chronic back pain,  He denies, chest pain, cough, abdominal pain, vomiting, diarrhea.  Denies sick contacts.  Cxray is unremarkable.  EKG showed NSR 80/min.  Urinalysis showed +ve leuk esterase (mod), +ve nitrite.  Labs reviewed - [place] WBC to 30[address] with left shift, K is 3.2.  Initial lactate was 2.1, which nomalized after IV fluids and IV Ceftriaxone.    (22 Jun 2025 00:36)        HOSPITAL COURSE:        PHYSICAL EXAM:  VITALS:        You were admitted for   You were diagnosed with   You were treated with   You were prescribed the following new medications:    You will need to follow up with your primary care physician.    Source of Infection:  Antibiotic / Last Day:    Palliative Care / Advanced Care Planning  Code Status:  Patient/Family agreeable to Hospice/Palliative (Y/N)?  Summary of Goals of Care Conversation:    Discharging Provider:  Dr. Abeba Velazquez  Contact Info: [phone] - Please call with any questions or concerns.    Outpatient Provider:     Signout given to  SNF Provider:       Hospital Course  HPI:  Mr Osuna is a 74 year old male brought in by his daughter from home because of  headache, neck, back pain, right flank pain, weakness, fatigue and nausea, found to have fever of 102.7.   Pt has history of hypertension, hyperlipidemia, chronic diastolic CHF, hx of CAD, BPH, AAA, GERD, Gout, arthritis, chronic back pain,  He denies, chest pain, cough, abdominal pain, vomiting, diarrhea.  Denies sick contacts. Cxray is unremarkable.  EKG showed NSR 80/min. Urinalysis showed +ve leuk esterase (mod), +ve nitrite.  Labs reviewed - elev WBC to 40435 with left shift, K is 3.2. Initial lactate was 2.1, which nomalized after IV fluids and IV Ceftriaxone.  (22 Jun 2025 00:36)    Hospital course: patient was admitted with sepsis, likely due to UTI. CT showed left ureteral stone,  now s/p stent placement. ON IV Rocephine. wbc and lactate normalized. afebrile.  urine culture shows klebsiella resistant to Rocephin. urology suggested to dscharge home wit 14 days of Levaquin and follow up outpatient.   Patient was seen and examined at bedside. denied complaints. ready to go home. will see PCP Dr. Zhang and Urologist Dr. Walton in 1-2 weeks.     PHYSICAL EXAM:  VITALS:  Vital Signs Last 24 Hrs  T(C): 36.8 (06-25-25 @ 05:55), Max: 36.8 (06-24-25 @ 19:57)  T(F): 98.3 (06-25-25 @ 05:55), Max: 98.3 (06-24-25 @ 19:57)  HR: 61 (06-25-25 @ 05:55) (61 - 97)  BP: 163/87 (06-25-25 @ 05:55) (154/73 - 163/87)  BP(mean): --  RR: 18 (06-25-25 @ 05:55) (16 - 18)  SpO2: 94% (06-25-25 @ 05:55) (94% - 95%)    GENERAL: Not in distress. Alert    HEENT: AT/NC. clear conjuctiva, MMM.   no pallor or icterus  CARDIOVASCULAR: RRR S1, S2. No murmur/rubs/gallop  LUNGS: BLAE+, no rales, no wheezing, no rhonchi.    ABDOMEN: ND. Soft,  NT, no guarding / rebound / rigidity. BS normoactive. No CVA tenderness.    BACK: No spine tenderness.  EXTREMITIES: no edema. no leg or calf TP.  SKIN: no rash. warm and dry  NEUROLOGIC: AAO*3.strength is symmetric, sensation intact, speech fluent.    PSYCHIATRIC: Calm.  No agitation.    Source of Infection: Urine  Antibiotic / Last Day: levaquin. last day July 8    Palliative Care / Advanced Care Planning  Code Status: Full code  Patient/Family agreeable to Hospice/Palliative (Y/N)? N  Summary of Goals of Care Conversation: going back home    Discharging Provider:  Dr. Abeba Velazquez  Contact Info: [phone] - Please call with any questions or concerns.    Outpatient Provider: Dr. Rito Zhang [ informed]

## 2025-06-25 NOTE — DISCHARGE NOTE PROVIDER - CARE PROVIDER_API CALL
Carine Walton  Urology  [address] [place] [address]  Phone: [phone]  Fax: [phone]  Follow Up Time: 1 week    Rito Zhang  [facility]  [address]  [place] [address]  Phone: [phone]  Fax: [phone]  Follow Up Time:

## 2025-06-26 LAB
-  AMOXICILLIN/CLAVULANIC ACID: SIGNIFICANT CHANGE UP
-  AMPICILLIN/SULBACTAM: SIGNIFICANT CHANGE UP
-  AMPICILLIN: SIGNIFICANT CHANGE UP
-  AZTREONAM: SIGNIFICANT CHANGE UP
-  CEFAZOLIN: SIGNIFICANT CHANGE UP
-  CEFEPIME: SIGNIFICANT CHANGE UP
-  CEFOXITIN: SIGNIFICANT CHANGE UP
-  CEFTRIAXONE: SIGNIFICANT CHANGE UP
-  CIPROFLOXACIN: SIGNIFICANT CHANGE UP
-  ERTAPENEM: SIGNIFICANT CHANGE UP
-  GENTAMICIN: SIGNIFICANT CHANGE UP
-  IMIPENEM: SIGNIFICANT CHANGE UP
-  LEVOFLOXACIN: SIGNIFICANT CHANGE UP
-  MEROPENEM: SIGNIFICANT CHANGE UP
-  PIPERACILLIN/TAZOBACTAM: SIGNIFICANT CHANGE UP
-  TIGECYCLINE: SIGNIFICANT CHANGE UP
-  TOBRAMYCIN: SIGNIFICANT CHANGE UP
-  TRIMETHOPRIM/SULFAMETHOXAZOLE: SIGNIFICANT CHANGE UP
CULTURE RESULTS: ABNORMAL
METHOD TYPE: SIGNIFICANT CHANGE UP
ORGANISM # SPEC MICROSCOPIC CNT: ABNORMAL
ORGANISM # SPEC MICROSCOPIC CNT: SIGNIFICANT CHANGE UP
SPECIMEN SOURCE: SIGNIFICANT CHANGE UP

## 2025-06-27 LAB
CULTURE RESULTS: SIGNIFICANT CHANGE UP
CULTURE RESULTS: SIGNIFICANT CHANGE UP
SPECIMEN SOURCE: SIGNIFICANT CHANGE UP
SPECIMEN SOURCE: SIGNIFICANT CHANGE UP

## 2025-07-01 ENCOUNTER — APPOINTMENT (OUTPATIENT)
Dept: UROLOGY | Facility: CLINIC | Age: 75
End: 2025-07-01
Payer: COMMERCIAL

## 2025-07-01 VITALS
SYSTOLIC BLOOD PRESSURE: 126 MMHG | OXYGEN SATURATION: 97 % | DIASTOLIC BLOOD PRESSURE: 80 MMHG | HEIGHT: 73 IN | HEART RATE: 72 BPM | TEMPERATURE: 97.3 F | WEIGHT: 220 LBS | BODY MASS INDEX: 29.16 KG/M2

## 2025-07-01 PROBLEM — N20.0 KIDNEY STONE ON LEFT SIDE: Status: ACTIVE | Noted: 2025-07-01

## 2025-07-01 PROBLEM — Z12.5 SCREENING PSA (PROSTATE SPECIFIC ANTIGEN): Status: ACTIVE | Noted: 2025-07-01

## 2025-07-01 PROBLEM — N28.1 SIMPLE CYST OF KIDNEY: Status: ACTIVE | Noted: 2025-07-01

## 2025-07-01 PROBLEM — N20.0 NEPHROLITHIASIS: Status: ACTIVE | Noted: 2025-07-01

## 2025-07-01 PROCEDURE — 99215 OFFICE O/P EST HI 40 MIN: CPT

## 2025-07-02 LAB
ALBUMIN SERPL ELPH-MCNC: 4 G/DL
ALP BLD-CCNC: 92 U/L
ALT SERPL-CCNC: 30 U/L
ANION GAP SERPL CALC-SCNC: 13 MMOL/L
APPEARANCE: ABNORMAL
APTT BLD: 32.8 SEC
AST SERPL-CCNC: 39 U/L
BACTERIA: NEGATIVE /HPF
BILIRUB SERPL-MCNC: 0.3 MG/DL
BILIRUBIN URINE: NEGATIVE
BLOOD URINE: ABNORMAL
BUN SERPL-MCNC: 17 MG/DL
CALCIUM OXALATE CRYSTALS: PRESENT
CALCIUM SERPL-MCNC: 9 MG/DL
CAST: 1 /LPF
CHLORIDE SERPL-SCNC: 104 MMOL/L
CO2 SERPL-SCNC: 25 MMOL/L
COLOR: NORMAL
CREAT SERPL-MCNC: 0.94 MG/DL
EGFRCR SERPLBLD CKD-EPI 2021: 85 ML/MIN/1.73M2
EPITHELIAL CELLS: 2 /HPF
ESTIMATED AVERAGE GLUCOSE: 143 MG/DL
GLUCOSE QUALITATIVE U: NEGATIVE MG/DL
GLUCOSE SERPL-MCNC: 129 MG/DL
HBA1C MFR BLD HPLC: 6.6 %
INR PPP: 1.05 RATIO
KETONES URINE: ABNORMAL MG/DL
LEUKOCYTE ESTERASE URINE: ABNORMAL
MICROSCOPIC-UA: NORMAL
NITRITE URINE: NEGATIVE
PH URINE: 6
POTASSIUM SERPL-SCNC: 4 MMOL/L
PROT SERPL-MCNC: 6.6 G/DL
PROTEIN URINE: 100 MG/DL
PT BLD: 12.5 SEC
RED BLOOD CELLS URINE: 572 /HPF
REVIEW: NORMAL
SODIUM SERPL-SCNC: 143 MMOL/L
SPECIFIC GRAVITY URINE: 1.02
UROBILINOGEN URINE: 0.2 MG/DL
WHITE BLOOD CELLS URINE: 10 /HPF

## 2025-07-04 LAB
BACTERIA UR CULT: NORMAL
BASOPHILS # BLD AUTO: 0.07 K/UL
BASOPHILS NFR BLD AUTO: 0.9 %
EOSINOPHIL # BLD AUTO: 0.28 K/UL
EOSINOPHIL NFR BLD AUTO: 3.6 %
HCT VFR BLD CALC: 45.1 %
HGB BLD-MCNC: 13.7 G/DL
IMM GRANULOCYTES NFR BLD AUTO: 0.4 %
LYMPHOCYTES # BLD AUTO: 2.04 K/UL
LYMPHOCYTES NFR BLD AUTO: 26.4 %
MAN DIFF?: NORMAL
MCHC RBC-ENTMCNC: 28.8 PG
MCHC RBC-ENTMCNC: 30.4 G/DL
MCV RBC AUTO: 94.7 FL
MONOCYTES # BLD AUTO: 0.54 K/UL
MONOCYTES NFR BLD AUTO: 7 %
NEUTROPHILS # BLD AUTO: 4.77 K/UL
NEUTROPHILS NFR BLD AUTO: 61.7 %
PLATELET # BLD AUTO: 313 K/UL
RBC # BLD: 4.76 M/UL
RBC # FLD: 13.2 %
WBC # FLD AUTO: 7.73 K/UL

## 2025-08-09 ENCOUNTER — EMERGENCY (EMERGENCY)
Facility: HOSPITAL | Age: 75
LOS: 1 days | End: 2025-08-09
Attending: STUDENT IN AN ORGANIZED HEALTH CARE EDUCATION/TRAINING PROGRAM | Admitting: STUDENT IN AN ORGANIZED HEALTH CARE EDUCATION/TRAINING PROGRAM
Payer: COMMERCIAL

## 2025-08-09 VITALS
TEMPERATURE: 98 F | OXYGEN SATURATION: 95 % | RESPIRATION RATE: 18 BRPM | HEIGHT: 71 IN | SYSTOLIC BLOOD PRESSURE: 160 MMHG | DIASTOLIC BLOOD PRESSURE: 87 MMHG | WEIGHT: 235.01 LBS | HEART RATE: 83 BPM

## 2025-08-09 DIAGNOSIS — Z98.890 OTHER SPECIFIED POSTPROCEDURAL STATES: Chronic | ICD-10-CM

## 2025-08-09 DIAGNOSIS — Z90.49 ACQUIRED ABSENCE OF OTHER SPECIFIED PARTS OF DIGESTIVE TRACT: Chronic | ICD-10-CM

## 2025-08-09 LAB
APPEARANCE UR: ABNORMAL
BACTERIA # UR AUTO: ABNORMAL /HPF
BILIRUB UR-MCNC: NEGATIVE — SIGNIFICANT CHANGE UP
COLOR SPEC: YELLOW — SIGNIFICANT CHANGE UP
DIFF PNL FLD: ABNORMAL
EPI CELLS # UR: PRESENT
GLUCOSE UR QL: NEGATIVE MG/DL — SIGNIFICANT CHANGE UP
KETONES UR QL: ABNORMAL MG/DL
LEUKOCYTE ESTERASE UR-ACNC: ABNORMAL
NITRITE UR-MCNC: NEGATIVE — SIGNIFICANT CHANGE UP
PH UR: 6 — SIGNIFICANT CHANGE UP (ref 5–8)
PROT UR-MCNC: 300 MG/DL
RBC CASTS # UR COMP ASSIST: >50 /HPF — HIGH (ref 0–4)
SP GR SPEC: 1.02 — SIGNIFICANT CHANGE UP (ref 1–1.03)
UROBILINOGEN FLD QL: 1 MG/DL — SIGNIFICANT CHANGE UP (ref 0.2–1)
WBC UR QL: 0 /HPF — SIGNIFICANT CHANGE UP (ref 0–5)

## 2025-08-09 PROCEDURE — 99283 EMERGENCY DEPT VISIT LOW MDM: CPT

## 2025-08-09 PROCEDURE — 87086 URINE CULTURE/COLONY COUNT: CPT

## 2025-08-09 PROCEDURE — 81001 URINALYSIS AUTO W/SCOPE: CPT

## 2025-08-09 RX ORDER — PHENAZOPYRIDINE HCL 100 MG
1 TABLET ORAL
Qty: 15 | Refills: 0
Start: 2025-08-09 | End: 2025-08-13

## 2025-08-11 LAB
CULTURE RESULTS: NO GROWTH — SIGNIFICANT CHANGE UP
SPECIMEN SOURCE: SIGNIFICANT CHANGE UP

## 2025-08-12 LAB
ALBUMIN SERPL ELPH-MCNC: 4.2 G/DL
ALP BLD-CCNC: 100 U/L
ALT SERPL-CCNC: 22 U/L
ANION GAP SERPL CALC-SCNC: 16 MMOL/L
APPEARANCE: ABNORMAL
APTT BLD: 32.4 SEC
AST SERPL-CCNC: 34 U/L
BACTERIA: NEGATIVE /HPF
BASOPHILS # BLD AUTO: 0.05 K/UL
BASOPHILS NFR BLD AUTO: 0.6 %
BILIRUB SERPL-MCNC: 0.4 MG/DL
BILIRUBIN URINE: ABNORMAL
BLOOD URINE: ABNORMAL
BUN SERPL-MCNC: 12 MG/DL
CALCIUM OXALATE CRYSTALS: PRESENT
CALCIUM SERPL-MCNC: 9.6 MG/DL
CAST: 0 /LPF
CHLORIDE SERPL-SCNC: 106 MMOL/L
CO2 SERPL-SCNC: 23 MMOL/L
COLOR: ABNORMAL
CREAT SERPL-MCNC: 1 MG/DL
EGFRCR SERPLBLD CKD-EPI 2021: 79 ML/MIN/1.73M2
EOSINOPHIL # BLD AUTO: 0.22 K/UL
EOSINOPHIL NFR BLD AUTO: 2.5 %
EPITHELIAL CELLS: 1 /HPF
ESTIMATED AVERAGE GLUCOSE: 137 MG/DL
GLUCOSE QUALITATIVE U: NEGATIVE MG/DL
GLUCOSE SERPL-MCNC: 102 MG/DL
HBA1C MFR BLD HPLC: 6.4 %
HCT VFR BLD CALC: 41.5 %
HGB BLD-MCNC: 13.1 G/DL
IMM GRANULOCYTES NFR BLD AUTO: 0.1 %
INR PPP: 1.05 RATIO
KETONES URINE: NEGATIVE MG/DL
LEUKOCYTE ESTERASE URINE: ABNORMAL
LYMPHOCYTES # BLD AUTO: 2.59 K/UL
LYMPHOCYTES NFR BLD AUTO: 28.9 %
MAN DIFF?: NORMAL
MCHC RBC-ENTMCNC: 28.4 PG
MCHC RBC-ENTMCNC: 31.6 G/DL
MCV RBC AUTO: 90 FL
MICROSCOPIC-UA: NORMAL
MONOCYTES # BLD AUTO: 0.68 K/UL
MONOCYTES NFR BLD AUTO: 7.6 %
NEUTROPHILS # BLD AUTO: 5.42 K/UL
NEUTROPHILS NFR BLD AUTO: 60.3 %
NITRITE URINE: POSITIVE
PH URINE: 5
PLATELET # BLD AUTO: 200 K/UL
POTASSIUM SERPL-SCNC: 4 MMOL/L
PROT SERPL-MCNC: 6.7 G/DL
PROTEIN URINE: 300 MG/DL
PT BLD: 12.4 SEC
RBC # BLD: 4.61 M/UL
RBC # FLD: 13.2 %
RED BLOOD CELLS URINE: 42 /HPF
REVIEW: NORMAL
SODIUM SERPL-SCNC: 145 MMOL/L
SPECIFIC GRAVITY URINE: 1.03
UROBILINOGEN URINE: 1 MG/DL
WBC # FLD AUTO: 8.97 K/UL
WHITE BLOOD CELLS URINE: 0 /HPF

## 2025-08-13 ENCOUNTER — APPOINTMENT (OUTPATIENT)
Dept: UROLOGY | Facility: HOSPITAL | Age: 75
End: 2025-08-13

## 2025-08-15 LAB — BACTERIA UR CULT: ABNORMAL

## 2025-08-17 RX ORDER — CIPROFLOXACIN HYDROCHLORIDE 500 MG/1
500 TABLET, FILM COATED ORAL
Qty: 28 | Refills: 0 | Status: DISCONTINUED | COMMUNITY
Start: 2025-08-13 | End: 2025-08-17

## 2025-08-20 ENCOUNTER — OUTPATIENT (OUTPATIENT)
Dept: OUTPATIENT SERVICES | Facility: HOSPITAL | Age: 75
LOS: 1 days | End: 2025-08-20
Payer: COMMERCIAL

## 2025-08-20 ENCOUNTER — APPOINTMENT (OUTPATIENT)
Dept: UROLOGY | Facility: HOSPITAL | Age: 75
End: 2025-08-20

## 2025-08-20 ENCOUNTER — TRANSCRIPTION ENCOUNTER (OUTPATIENT)
Age: 75
End: 2025-08-20

## 2025-08-20 VITALS
DIASTOLIC BLOOD PRESSURE: 72 MMHG | SYSTOLIC BLOOD PRESSURE: 167 MMHG | HEART RATE: 62 BPM | RESPIRATION RATE: 18 BRPM | OXYGEN SATURATION: 97 %

## 2025-08-20 VITALS
RESPIRATION RATE: 17 BRPM | HEIGHT: 73 IN | DIASTOLIC BLOOD PRESSURE: 91 MMHG | OXYGEN SATURATION: 95 % | HEART RATE: 58 BPM | TEMPERATURE: 98 F | WEIGHT: 220.02 LBS | SYSTOLIC BLOOD PRESSURE: 147 MMHG

## 2025-08-20 DIAGNOSIS — Z98.890 OTHER SPECIFIED POSTPROCEDURAL STATES: Chronic | ICD-10-CM

## 2025-08-20 DIAGNOSIS — Z90.49 ACQUIRED ABSENCE OF OTHER SPECIFIED PARTS OF DIGESTIVE TRACT: Chronic | ICD-10-CM

## 2025-08-20 DIAGNOSIS — N20.0 CALCULUS OF KIDNEY: ICD-10-CM

## 2025-08-20 PROCEDURE — 82365 CALCULUS SPECTROSCOPY: CPT

## 2025-08-20 PROCEDURE — 76000 FLUOROSCOPY <1 HR PHYS/QHP: CPT

## 2025-08-20 PROCEDURE — 52356 CYSTO/URETERO W/LITHOTRIPSY: CPT

## 2025-08-20 PROCEDURE — C2617: CPT

## 2025-08-20 PROCEDURE — 52356 CYSTO/URETERO W/LITHOTRIPSY: CPT | Mod: LT

## 2025-08-20 PROCEDURE — C1769: CPT

## 2025-08-20 PROCEDURE — 88300 SURGICAL PATH GROSS: CPT

## 2025-08-20 PROCEDURE — 87086 URINE CULTURE/COLONY COUNT: CPT

## 2025-08-20 PROCEDURE — C1889: CPT

## 2025-08-20 PROCEDURE — C1758: CPT

## 2025-08-20 PROCEDURE — 88300 SURGICAL PATH GROSS: CPT | Mod: 26

## 2025-08-20 PROCEDURE — 74420 UROGRAPHY RTRGR +-KUB: CPT | Mod: 26

## 2025-08-20 DEVICE — URETERAL STENT TRIA SOFT 6FR 26MM: Type: IMPLANTABLE DEVICE | Status: FUNCTIONAL

## 2025-08-20 DEVICE — URETERAL CATH WHISTLE TIP 5FR LEFT: Type: IMPLANTABLE DEVICE | Status: FUNCTIONAL

## 2025-08-20 DEVICE — STONE BASKET ZEROTIP NITINOL 4-WIRE 1.9FR 120CM X 12MM: Type: IMPLANTABLE DEVICE | Status: FUNCTIONAL

## 2025-08-20 DEVICE — LASER FIBER MOSES 365 D/F/L: Type: IMPLANTABLE DEVICE | Status: FUNCTIONAL

## 2025-08-20 DEVICE — GUIDEWIRE SENSOR DUAL-FLEX NITINOL STRAIGHT .035" X 150CM: Type: IMPLANTABLE DEVICE | Status: FUNCTIONAL

## 2025-08-20 RX ORDER — HYDROMORPHONE/SOD CHLOR,ISO/PF 2 MG/10 ML
0.5 SYRINGE (ML) INJECTION
Refills: 0 | Status: DISCONTINUED | OUTPATIENT
Start: 2025-08-20 | End: 2025-08-20

## 2025-08-20 RX ORDER — LINEZOLID 2 MG/ML
600 INJECTION, SOLUTION INTRAVENOUS ONCE
Refills: 0 | Status: COMPLETED | OUTPATIENT
Start: 2025-08-20 | End: 2025-08-20

## 2025-08-20 RX ORDER — IBUPROFEN 200 MG
1 TABLET ORAL
Qty: 28 | Refills: 0
Start: 2025-08-20 | End: 2025-08-26

## 2025-08-20 RX ORDER — OXYCODONE HYDROCHLORIDE 30 MG/1
5 TABLET ORAL ONCE
Refills: 0 | Status: DISCONTINUED | OUTPATIENT
Start: 2025-08-20 | End: 2025-08-20

## 2025-08-20 RX ORDER — ONDANSETRON HCL/PF 4 MG/2 ML
4 VIAL (ML) INJECTION ONCE
Refills: 0 | Status: DISCONTINUED | OUTPATIENT
Start: 2025-08-20 | End: 2025-08-20

## 2025-08-20 RX ORDER — GENTAMICIN SULFATE 40 MG/ML
160 VIAL (ML) INJECTION ONCE
Refills: 0 | Status: COMPLETED | OUTPATIENT
Start: 2025-08-20 | End: 2025-08-20

## 2025-08-20 RX ORDER — SODIUM CHLORIDE 9 G/1000ML
1000 INJECTION, SOLUTION INTRAVENOUS
Refills: 0 | Status: DISCONTINUED | OUTPATIENT
Start: 2025-08-20 | End: 2025-08-20

## 2025-08-20 RX ORDER — ACETAMINOPHEN 500 MG/5ML
2 LIQUID (ML) ORAL
Qty: 56 | Refills: 0
Start: 2025-08-20 | End: 2025-08-26

## 2025-08-20 RX ADMIN — SODIUM CHLORIDE 75 MILLILITER(S): 9 INJECTION, SOLUTION INTRAVENOUS at 11:39

## 2025-08-22 LAB
CULTURE RESULTS: NO GROWTH — SIGNIFICANT CHANGE UP
SPECIMEN SOURCE: SIGNIFICANT CHANGE UP

## (undated) DEVICE — SOL IRR BAG H2O 3000ML

## (undated) DEVICE — SEAL BIOPSY PORT ADJUSTABLE UP TO 9F

## (undated) DEVICE — POSITIONER STRAP ARMBOARD VELCRO TS-30

## (undated) DEVICE — PLV-SCD MACHINE: Type: DURABLE MEDICAL EQUIPMENT

## (undated) DEVICE — GLV 7.5 PROTEXIS (WHITE)

## (undated) DEVICE — SYR LUER LOK 20CC

## (undated) DEVICE — WARMING BLANKET UPPER ADULT

## (undated) DEVICE — SOL IRR POUR H2O 1000ML

## (undated) DEVICE — PACK CYSTO

## (undated) DEVICE — TUBING LEVEL ONE NORMOFLO SET

## (undated) DEVICE — DRAPE DRAINAGE BAG URO CATCH II

## (undated) DEVICE — ONCORE 26 INSUFLATION DEVICE

## (undated) DEVICE — VENODYNE/SCD SLEEVE CALF MEDIUM

## (undated) DEVICE — DRAPE TOWEL BLUE 17" X 24"

## (undated) DEVICE — UROVAC

## (undated) DEVICE — SOL INJ NS 0.9% 1000ML

## (undated) DEVICE — SYR LUER LOK 10CC

## (undated) DEVICE — PRESSURE INFUSOR BAG 1000ML

## (undated) DEVICE — CLAMP BX URETERSP FLEX 1MM 15CM

## (undated) DEVICE — DRAPE CAMERA VIDEO 7"X96"

## (undated) DEVICE — IRR BULB PATHFINDER + 10"

## (undated) DEVICE — POSITIONER FOAM HEAD CRADLE (PINK)

## (undated) DEVICE — DRAPE C ARM UNIVERSAL

## (undated) DEVICE — DRAPE SNAP KOVER 36X28

## (undated) DEVICE — FLOORMAT SURGISAFE ABSORBANT WHITE 36" X 72"

## (undated) DEVICE — VENODYNE/SCD SLEEVE CALF LARGE

## (undated) DEVICE — GOWN LG

## (undated) DEVICE — SOL IRR BAG NS 0.9% 3000ML

## (undated) DEVICE — PLV-LASER - LUMENIS PULSE MOSES 2.0 1468: Type: DURABLE MEDICAL EQUIPMENT

## (undated) DEVICE — Device

## (undated) DEVICE — SPECIMEN CONTAINER PET

## (undated) DEVICE — FOLEY TRAY 14FR 5CC LTX UMETER CLOSED